# Patient Record
Sex: FEMALE | Race: WHITE | NOT HISPANIC OR LATINO | Employment: OTHER | URBAN - METROPOLITAN AREA
[De-identification: names, ages, dates, MRNs, and addresses within clinical notes are randomized per-mention and may not be internally consistent; named-entity substitution may affect disease eponyms.]

---

## 2017-01-10 ENCOUNTER — OFFICE VISIT - HEALTHEAST (OUTPATIENT)
Dept: FAMILY MEDICINE | Facility: CLINIC | Age: 57
End: 2017-01-10

## 2017-01-10 DIAGNOSIS — G40.909 SEIZURE DISORDER (H): ICD-10-CM

## 2017-01-10 DIAGNOSIS — L30.9 ECZEMA: ICD-10-CM

## 2017-01-10 DIAGNOSIS — Z00.00 HEALTH CARE MAINTENANCE: ICD-10-CM

## 2017-01-10 DIAGNOSIS — L40.1 PUSTULAR PSORIASIS: ICD-10-CM

## 2017-01-10 ASSESSMENT — MIFFLIN-ST. JEOR: SCORE: 1261.73

## 2017-01-16 ENCOUNTER — HOSPITAL ENCOUNTER (OUTPATIENT)
Dept: MAMMOGRAPHY | Facility: HOSPITAL | Age: 57
Discharge: HOME | End: 2017-01-16
Attending: FAMILY MEDICINE

## 2017-01-16 DIAGNOSIS — Z00.00 HEALTH CARE MAINTENANCE: ICD-10-CM

## 2017-01-19 ENCOUNTER — RECORDS - HEALTHEAST (OUTPATIENT)
Dept: ADMINISTRATIVE | Facility: OTHER | Age: 57
End: 2017-01-19

## 2017-02-22 ENCOUNTER — COMMUNICATION - HEALTHEAST (OUTPATIENT)
Dept: FAMILY MEDICINE | Facility: CLINIC | Age: 57
End: 2017-02-22

## 2017-02-27 ENCOUNTER — COMMUNICATION - HEALTHEAST (OUTPATIENT)
Dept: FAMILY MEDICINE | Facility: CLINIC | Age: 57
End: 2017-02-27

## 2017-02-27 ENCOUNTER — AMBULATORY - HEALTHEAST (OUTPATIENT)
Dept: LAB | Facility: CLINIC | Age: 57
End: 2017-02-27

## 2017-02-27 DIAGNOSIS — L70.0 ACNE VULGARIS: ICD-10-CM

## 2017-02-27 DIAGNOSIS — L40.1 GENERALIZED PUSTULAR PSORIASIS: ICD-10-CM

## 2017-03-02 ENCOUNTER — AMBULATORY - HEALTHEAST (OUTPATIENT)
Dept: LAB | Facility: CLINIC | Age: 57
End: 2017-03-02

## 2017-03-02 ENCOUNTER — RECORDS - HEALTHEAST (OUTPATIENT)
Dept: GENERAL RADIOLOGY | Facility: CLINIC | Age: 57
End: 2017-03-02

## 2017-03-02 DIAGNOSIS — L40.1 GENERALIZED PUSTULAR PSORIASIS: ICD-10-CM

## 2017-03-02 DIAGNOSIS — L70.0 ACNE VULGARIS: ICD-10-CM

## 2017-03-02 LAB
CHOLEST SERPL-MCNC: 184 MG/DL
FASTING STATUS PATIENT QL REPORTED: YES
HBV SURFACE AG SERPL QL IA: NEGATIVE
HCV AB SERPL QL IA: ABNORMAL
HDLC SERPL-MCNC: 48 MG/DL
HIV 1+2 AB+HIV1 P24 AG SERPL QL IA: NEGATIVE
LDLC SERPL CALC-MCNC: 109 MG/DL
TRIGL SERPL-MCNC: 135 MG/DL

## 2017-03-03 LAB
HBV CORE AB SERPL QL IA: NEGATIVE
HEPATITIS B SURFACE ANTIBODY LHE- HISTORICAL: NEGATIVE

## 2017-07-13 ENCOUNTER — AMBULATORY - HEALTHEAST (OUTPATIENT)
Dept: LAB | Facility: CLINIC | Age: 57
End: 2017-07-13

## 2017-07-13 DIAGNOSIS — L40.1 GENERALIZED PUSTULAR PSORIASIS: ICD-10-CM

## 2017-07-13 LAB
HBV SURFACE AG SERPL QL IA: NEGATIVE
HCV AB SERPL QL IA: NEGATIVE
HEPATITIS B SURFACE ANTIBODY LHE- HISTORICAL: NEGATIVE

## 2017-07-14 LAB — HBV CORE AB SERPL QL IA: NEGATIVE

## 2017-08-30 ENCOUNTER — COMMUNICATION - HEALTHEAST (OUTPATIENT)
Dept: FAMILY MEDICINE | Facility: CLINIC | Age: 57
End: 2017-08-30

## 2017-09-18 ENCOUNTER — COMMUNICATION - HEALTHEAST (OUTPATIENT)
Dept: FAMILY MEDICINE | Facility: CLINIC | Age: 57
End: 2017-09-18

## 2017-10-13 ENCOUNTER — COMMUNICATION - HEALTHEAST (OUTPATIENT)
Dept: FAMILY MEDICINE | Facility: CLINIC | Age: 57
End: 2017-10-13

## 2017-10-13 DIAGNOSIS — G40.909 SEIZURE DISORDER (H): ICD-10-CM

## 2017-12-15 ENCOUNTER — COMMUNICATION - HEALTHEAST (OUTPATIENT)
Dept: ADMINISTRATIVE | Facility: CLINIC | Age: 57
End: 2017-12-15

## 2017-12-15 DIAGNOSIS — G40.909 SEIZURE DISORDER (H): ICD-10-CM

## 2017-12-15 DIAGNOSIS — L40.9 PSORIASIS: ICD-10-CM

## 2018-01-04 ENCOUNTER — TRANSFERRED RECORDS (OUTPATIENT)
Dept: HEALTH INFORMATION MANAGEMENT | Facility: CLINIC | Age: 58
End: 2018-01-04

## 2018-02-12 ENCOUNTER — TRANSFERRED RECORDS (OUTPATIENT)
Dept: HEALTH INFORMATION MANAGEMENT | Facility: CLINIC | Age: 58
End: 2018-02-12

## 2018-02-12 ENCOUNTER — RECORDS - HEALTHEAST (OUTPATIENT)
Dept: LAB | Facility: HOSPITAL | Age: 58
End: 2018-02-12

## 2018-02-12 LAB
ANION GAP SERPL CALCULATED.3IONS-SCNC: 10 MMOL/L (ref 5–18)
CHLORIDE BLD-SCNC: 103 MMOL/L (ref 98–107)
CO2 SERPL-SCNC: 28 MMOL/L (ref 22–31)
LEVETIRACETAM (KEPPRA): 29.5 UG/ML (ref 6–46)
POTASSIUM BLD-SCNC: 3.9 MMOL/L (ref 3.5–5)
SODIUM SERPL-SCNC: 141 MMOL/L (ref 136–145)

## 2018-05-07 ENCOUNTER — TRANSFERRED RECORDS (OUTPATIENT)
Dept: HEALTH INFORMATION MANAGEMENT | Facility: CLINIC | Age: 58
End: 2018-05-07

## 2018-08-28 ENCOUNTER — TRANSFERRED RECORDS (OUTPATIENT)
Dept: HEALTH INFORMATION MANAGEMENT | Facility: CLINIC | Age: 58
End: 2018-08-28

## 2019-01-10 ENCOUNTER — TELEPHONE (OUTPATIENT)
Dept: NEUROLOGY | Facility: CLINIC | Age: 59
End: 2019-01-10

## 2019-01-10 NOTE — TELEPHONE ENCOUNTER
"I had a chance to speak with Ayala.      Patient reports that in 2011 she had episodes of dizziness and visual flashes of white light.  She was seen by her eye doctor who seemed alarmed at what he saw on exam (\"Papilledema both eyes.  Needs MRI to r/o intracranial process\").    MRI was performed 12/6/11  IMPRESSION:  1. Dilated optic nerve sheaths bilaterally with tortuosity of optic  nerves and slight flattening of posterior sclera. Findings are  consistent with some intracranial hypertension. Since there is no  evidence for any intracranial hydrocephalus, venous thrombosis,  intracranial hemorrhage, or any cerebral edema or focal masses, the  patient most likely has pseudotumor cerebri.  2. Bilateral maxillary sinus disease with fluid and/or secretions.  Findings suggest acute sinusitis.     --She saw Neuro-ophthalmology 12/14/11.   --Had Diagnostic LP 5/1/13 (opening pressure 19cm H2O)  --She had an EEG at [facility] and was told she suffers from partial seizures.  --She's been on Levetiracetam since diagnosis.  She's also been followed by [facility] since that time.    She says she's never had a seizure, to her knowledge.  However, she states she's been getting the \"white flashes\" more frequently, with the last being about 2 weeks ago.    She has her AED levels checked once a year, with the last being 2/12/18:  29.5ug/mL  She has a DMV LOC form completed every 2 years and believes that's coming due, too.    She didn't feel she'd be able to get her records from [facility] before Monday.      I did warn her she would likely not get her LOC form signed until we had the labs back.  I told her how to check the status of her DL online.      I will have forms ready to be signed when Ayala comes in Monday.    "

## 2019-01-10 NOTE — TELEPHONE ENCOUNTER
FUTURE VISIT INFORMATION        FUTURE VISIT INFORMATION:    Date: 1/14/19    Time:  0845    Location: [facility]   REFERRAL INFORMATION:    Referring provider:  Self    Referring providers clinic:      Reason for visit/diagnosis:  Partial seizures        NOTES (FOR ALL VISITS) STATUS DETAILS   OFFICE NOTE from referring provider     OFFICE NOTE from other specialist Printed  12/1/11 KEVIN  12/14/11 & 1/26/12 N  Neuro-ophthalmology  1/10/17 FP   DISCHARGE SUMMARY from hospital      DISCHARGE REPORT from the ER     OPERATIVE REPORT      MEDICATION LIST In Epic     IMAGING  (FOR ALL VISITS)       EMG      EEG      MRI (HEAD, NECK, SPINE) Printed/ Images in PACs  MRI Brain 12/6/11               OTHER

## 2019-01-14 ENCOUNTER — OFFICE VISIT (OUTPATIENT)
Dept: DERMATOLOGY | Facility: CLINIC | Age: 59
End: 2019-01-14
Payer: COMMERCIAL

## 2019-01-14 ENCOUNTER — OFFICE VISIT (OUTPATIENT)
Dept: NEUROLOGY | Facility: CLINIC | Age: 59
End: 2019-01-14
Payer: COMMERCIAL

## 2019-01-14 VITALS — HEART RATE: 72 BPM | OXYGEN SATURATION: 99 % | DIASTOLIC BLOOD PRESSURE: 86 MMHG | SYSTOLIC BLOOD PRESSURE: 133 MMHG

## 2019-01-14 VITALS
HEART RATE: 81 BPM | SYSTOLIC BLOOD PRESSURE: 124 MMHG | RESPIRATION RATE: 12 BRPM | HEIGHT: 68 IN | TEMPERATURE: 97.8 F | BODY MASS INDEX: 20.19 KG/M2 | DIASTOLIC BLOOD PRESSURE: 77 MMHG | WEIGHT: 133.2 LBS

## 2019-01-14 DIAGNOSIS — Z79.899 ENCOUNTER FOR LONG-TERM (CURRENT) USE OF MEDICATIONS: ICD-10-CM

## 2019-01-14 DIAGNOSIS — L40.9 PSORIASIS: Primary | ICD-10-CM

## 2019-01-14 DIAGNOSIS — R53.82 CHRONIC FATIGUE, UNSPECIFIED: ICD-10-CM

## 2019-01-14 DIAGNOSIS — R26.89 BALANCE PROBLEMS: ICD-10-CM

## 2019-01-14 DIAGNOSIS — G40.109 PARTIAL SEIZURE DISORDER (H): Primary | ICD-10-CM

## 2019-01-14 PROBLEM — L40.1 PUSTULAR PSORIASIS: Status: ACTIVE | Noted: 2017-01-10

## 2019-01-14 PROBLEM — G40.909 SEIZURE DISORDER (H): Status: ACTIVE | Noted: 2017-01-10

## 2019-01-14 LAB
ALBUMIN SERPL-MCNC: 4.2 G/DL (ref 3.4–5)
ALP SERPL-CCNC: 86 U/L (ref 40–150)
ALT SERPL W P-5'-P-CCNC: 26 U/L (ref 0–50)
ANION GAP SERPL CALCULATED.3IONS-SCNC: 6 MMOL/L (ref 3–14)
AST SERPL W P-5'-P-CCNC: 18 U/L (ref 0–45)
BILIRUB SERPL-MCNC: 0.4 MG/DL (ref 0.2–1.3)
BUN SERPL-MCNC: 14 MG/DL (ref 7–30)
CALCIUM SERPL-MCNC: 8.7 MG/DL (ref 8.5–10.1)
CHLORIDE SERPL-SCNC: 105 MMOL/L (ref 94–109)
CO2 SERPL-SCNC: 30 MMOL/L (ref 20–32)
CREAT SERPL-MCNC: 0.72 MG/DL (ref 0.52–1.04)
ERYTHROCYTE [DISTWIDTH] IN BLOOD BY AUTOMATED COUNT: 13 % (ref 10–15)
GFR SERPL CREATININE-BSD FRML MDRD: >90 ML/MIN/{1.73_M2}
GLUCOSE SERPL-MCNC: 108 MG/DL (ref 70–99)
HCT VFR BLD AUTO: 45.3 % (ref 35–47)
HGB BLD-MCNC: 15 G/DL (ref 11.7–15.7)
MCH RBC QN AUTO: 30.2 PG (ref 26.5–33)
MCHC RBC AUTO-ENTMCNC: 33.1 G/DL (ref 31.5–36.5)
MCV RBC AUTO: 91 FL (ref 78–100)
PLATELET # BLD AUTO: 245 10E9/L (ref 150–450)
POTASSIUM SERPL-SCNC: 3.7 MMOL/L (ref 3.4–5.3)
PROT SERPL-MCNC: 8 G/DL (ref 6.8–8.8)
RBC # BLD AUTO: 4.97 10E12/L (ref 3.8–5.2)
SODIUM SERPL-SCNC: 141 MMOL/L (ref 133–144)
TSH SERPL DL<=0.005 MIU/L-ACNC: 1.1 MU/L (ref 0.4–4)
VIT B12 SERPL-MCNC: 286 PG/ML (ref 193–986)
WBC # BLD AUTO: 7.8 10E9/L (ref 4–11)

## 2019-01-14 PROCEDURE — 99000 SPECIMEN HANDLING OFFICE-LAB: CPT | Performed by: PSYCHIATRY & NEUROLOGY

## 2019-01-14 PROCEDURE — 80053 COMPREHEN METABOLIC PANEL: CPT | Performed by: PSYCHIATRY & NEUROLOGY

## 2019-01-14 PROCEDURE — 99203 OFFICE O/P NEW LOW 30 MIN: CPT | Performed by: DERMATOLOGY

## 2019-01-14 PROCEDURE — 80177 DRUG SCRN QUAN LEVETIRACETAM: CPT | Mod: 90 | Performed by: PSYCHIATRY & NEUROLOGY

## 2019-01-14 PROCEDURE — 84443 ASSAY THYROID STIM HORMONE: CPT | Performed by: PSYCHIATRY & NEUROLOGY

## 2019-01-14 PROCEDURE — 36415 COLL VENOUS BLD VENIPUNCTURE: CPT | Performed by: PSYCHIATRY & NEUROLOGY

## 2019-01-14 PROCEDURE — 99204 OFFICE O/P NEW MOD 45 MIN: CPT | Performed by: PSYCHIATRY & NEUROLOGY

## 2019-01-14 PROCEDURE — 85027 COMPLETE CBC AUTOMATED: CPT | Performed by: PSYCHIATRY & NEUROLOGY

## 2019-01-14 PROCEDURE — 83921 ORGANIC ACID SINGLE QUANT: CPT | Mod: 90 | Performed by: PSYCHIATRY & NEUROLOGY

## 2019-01-14 PROCEDURE — 82607 VITAMIN B-12: CPT | Performed by: PSYCHIATRY & NEUROLOGY

## 2019-01-14 RX ORDER — FOLIC ACID 1 MG/1
1 TABLET ORAL DAILY
Qty: 90 TABLET | Refills: 3 | Status: SHIPPED | OUTPATIENT
Start: 2019-01-14 | End: 2019-10-08

## 2019-01-14 RX ORDER — METHOTREXATE 2.5 MG/1
TABLET ORAL
Qty: 4 TABLET | Refills: 0 | Status: SHIPPED | OUTPATIENT
Start: 2019-01-14 | End: 2019-01-21

## 2019-01-14 SDOH — SOCIAL STABILITY: SOCIAL NETWORK: ARE YOU MARRIED, WIDOWED, DIVORCED, SEPARATED, NEVER MARRIED, OR LIVING WITH A PARTNER?: DIVORCED

## 2019-01-14 ASSESSMENT — MIFFLIN-ST. JEOR: SCORE: 1222.69

## 2019-01-14 NOTE — PROGRESS NOTES
"INITIAL NEUROLOGY CONSULTATION    DATE OF VISIT: 1/14/2019  MRN: [id number]  PATIENT NAME: Ayala Wilson  YOB: 1960    REFERRING PROVIDER: No ref. provider found    Chief Complaint   Patient presents with     New Patient     Partial seizures       SUBJECTIVE:                                                      HPI:   Ayala Wilson is a 58 year old female who is self-referred for history of partial seizures. She was also followed int he past by Neuro-ophthalmology for papilledema. For the seizures, she previously followed with [facility] and has been on Keppra since her diagnosis of partial seizure in 2011. We dod not have her previous neurology records.     The patient tells me that she has some problems with dysequilibrium on her feet. I redirect the patient to a discussion about seizures, since this is why she is here. She says she was seeing Dr. Graves at [facility] in [place]. She says that she was diagnosed when she had woken one day with difficulty walking and having some visual changes, which occurred for months.  She was seen by the ophthalmologist first and they sent her on for the MRI and to see Dr. Cartagena. She says some vestibular testing was also done. She says that she did electroencephalogram through Dr. Graves's office and this was abnormal so they started her on Keppra. She says that the symptoms did dissipate once she reached a moderate dose. She says that she has not had any repeat testing done since starting the medication.    She says that in general she feels the Keppra helped. Though she later mentions she does still have flashes in her vision at times. She says that the tippiness is new over the past 6 months. She does not have history of LOC or incontinence with her seizures, and no other seizure type except for a dizzy/visual phenomenon. She later mentions that sometimes she looks in the mirror and appears to be \"blank.\"      She is on 1000mg " BID of the Keppra and thinks that this has been her dose for a few years. She does have some problems with fatigue. Otherwise, no side effects.     She has done physical therapy in the past for balance, which did help. The current balance problems is do not involve dizziness or room-spinning, No symptoms necessarily with laying in bed or head position changes.      She has no history of seizures as a child. No history of traumatic brain injury. She had a normal birth and childhood as far as she knows. She says that she plans to establish care with e primary care provider through [facility] since she recently moved from the [facility].     Family history includes stroke in mother.     Past Medical History:   Diagnosis Date     Contact dermatitis and other eczema, due to unspecified cause      Tobacco use disorder      Past Surgical History:   Procedure Laterality Date     SURGICAL HISTORY OF -       appendectomy         Current Outpatient Medications on File Prior to Visit:  calcipotriene-betamethasone dipropionate (ENSTILAR) 0.005-0.064 % external foam Apply topically daily   ixekizumab (TALTZ) 80 MG/ML SOAJ auto-injector Inject 80 mg Subcutaneous every 28 days   LEVETIRACETAM PO Take 1,000 mg by mouth 2 times daily     No current facility-administered medications on file prior to visit.   Allergies   Allergen Reactions     Zyban [Bupropion Hcl] Rash        Problem (# of Occurrences) Relation (Name,Age of Onset)    Cancer (1) Father: bone    Gastrointestinal Disease (1) Daughter (nahed): crohn's        Social History     Tobacco Use     Smoking status: Current Some Day Smoker     Packs/day: 1.00     Years: 30.00     Pack years: 30.00     Types: Cigarettes     Smokeless tobacco: Never Used   Substance Use Topics     Alcohol use: No     Drug use: No       REVIEW OF SYSTEMS:                                                      10-point review of systems is negative except as mentioned above in HPI.     EXAM:              "                                         Physical Exam:   Vitals: /77 (BP Location: Left arm, Patient Position: Sitting, Cuff Size: Adult Regular)   Pulse 81   Temp 97.8  F (36.6  C) (Tympanic)   Resp 12   Ht 1.727 m (5' 8\")   Wt 60.4 kg (133 lb 3.2 oz)   LMP 04/03/2007   BMI 20.25 kg/m    BMI= Body mass index is 20.25 kg/m .  GENERAL: NAD. Thin, frail woman.   CV: RRR. S1, S2.   EXTR: Feet are cool to the touch.   Neurologic:  MENTAL STATUS: Alert, attentive. Speech is fluent. Normal comprehension.  Normal attention and concentration. Adequate fund of knowledge.   CRANIAL NERVES: Discs technically difficult to visualize. Visual fields intact to confrontation. Pupils equally, round and reactive to light. Facial sensation and movement normal. EOM full. Hearing intact to conversation. Trapezius strength intact. Palate moves symmetrically. Tongue midline.  MOTOR: 5/5 in proximal and distal muscle groups of upper and lower extremities for size. Tone and bulk is thin.   DTRs: Intact and symmetric. Babinski down-going bilaterally.   SENSATION: Normal light touch and pinprick, except decreased pinprick to palmar surface of Left index finger. Intact proprioception. Vibration: Decreased at both ankles.   COORDINATION: Finger tapping normal. Knee heel shin normal.  STATION AND GAIT: Romberg negative. Casual gait is cautious. Tandem minimally unsteady.  Right hand-dominant.     Relevant Data:  MRI Brain (2.6.11):  IMPRESSION:  1. Dilated optic nerve sheaths bilaterally with tortuosity of optic  nerves and slight flattening of posterior sclera. Findings are  consistent with some intracranial hypertension. Since there is no  evidence for any intracranial hydrocephalus, venous thrombosis,  intracranial hemorrhage, or any cerebral edema or focal masses, the  patient most likely has pseudotumor cerebri.  2. Bilateral maxillary sinus disease with fluid and/or secretions.  Findings suggest acute sinusitis.    Imaging " reviewed by me. Agree with radiology read.     ASSESSMENT and PLAN:                                                      Assessment and Plan:     ICD-10-CM    1. Partial seizure disorder (H) G40.109 Keppra (Levetiracetam) Level     Comprehensive metabolic panel     EEG sleep deprived   2. Chronic fatigue, unspecified R53.82 CBC with platelets     Vitamin B12     Methylmalonic acid     TSH with free T4 reflex     PHYSICAL THERAPY REFERRAL   3. Encounter for long-term (current) use of medications Z79.899 Keppra (Levetiracetam) Level     Comprehensive metabolic panel     EEG sleep deprived   4. Balance problems R26.89 PHYSICAL THERAPY REFERRAL        Ms. Wilson is a pleasant 59 yo woman self-referred to establish care for partial seizure disorder. We have very little history available and the patient's description of her symptoms is somewhat vague. It is not clear to me whether the visual symptoms she occasionally experiences are seizures or an alternative phenomenon. I would like to get her prior neurology records and do an updated electroencephalogram to better understand her case. She seems to be tolerating the Keppra well, so we will plan to continue this for now and check a level and basic labs today. I did encourage her to establish care with a primary doctor for further evaluation of her balance problems. It does not sound like a vestibular problem. In the meantime, I recommend she try physical therapy. We will see her back in 2-3 months. The patient understands and agrees with the plan.     Patient Instructions:  Basic labs today for medication monitoring.   Continue the current dose of Keppra (Levetiracetam) for now: 1000mg twice daily.   Sleep-deprived electroencephalogram.  Referral for physical therapy for the balance.   Return to clinic in 2-3 months.  Make an appointment to establish care with a primary care provider too, as we discussed.    We will try to get your previous neurology records in the  meantime.    Total Time: 45 minutes were spent with the patient. More than 50% of the time spent on counseling (as described above in Assessment and Plan/Instructions) /coordinating the care.    Kay Lomeli MD  Neurology

## 2019-01-14 NOTE — NURSING NOTE
No chief complaint on file.      Vitals:    01/14/19 0949   BP: 133/86   Pulse: 72   SpO2: 99%     Wt Readings from Last 1 Encounters:   01/14/19 60.4 kg (133 lb 3.2 oz)       Archana Taylor LPN.................1/14/2019

## 2019-01-14 NOTE — LETTER
1/14/2019         RE: Ayala Wilson  [address] [address] [facility] [id number]        Dear Colleague,    Thank you for referring your patient, Ayala Wilson, to the [facility]. Please see a copy of my visit note below.    Ayala Wilson is a 58 year old year old female patient here today for psroasis on hands.  Has been on  Humira, nbuvb, currently on talz for one year.  It is a little better, not clear.  Joint pain on fingers.   .  Patient states this has been present for years.  Patient reports the following symptoms:  Rash.  Bx pemberton dpustular psoriasis.  .  Patient reports the following previous treatments see above.  Patient reports the following modifying factors none.  Associated symptoms: none.  Patient has no other skin complaints today.  Remainder of the HPI, Meds, PMH, Allergies, FH, and SH was reviewed in chart.      Past Medical History:   Diagnosis Date     Contact dermatitis and other eczema, due to unspecified cause      Tobacco use disorder        Past Surgical History:   Procedure Laterality Date     SURGICAL HISTORY OF -       appendectomy        Family History   Problem Relation Age of Onset     Cancer Father         bone     Gastrointestinal Disease Daughter         crohn's       Social History     Socioeconomic History     Marital status:      Spouse name: Not on file     Number of children: Not on file     Years of education: Not on file     Highest education level: Not on file   Social Needs     Financial resource strain: Not on file     Food insecurity - worry: Not on file     Food insecurity - inability: Not on file     Transportation needs - medical: Not on file     Transportation needs - non-medical: Not on file   Occupational History     Not on file   Tobacco Use     Smoking status: Current Some Day Smoker     Packs/day: 1.00     Years: 30.00     Pack years: 30.00     Types: Cigarettes     Smokeless tobacco: Never Used   Substance and Sexual Activity      Alcohol use: No     Drug use: No     Sexual activity: Yes     Partners: Male   Other Topics Concern     Parent/sibling w/ CABG, MI or angioplasty before 65F 55M? No   Social History Narrative     Not on file       Outpatient Encounter Medications as of 1/14/2019   Medication Sig Dispense Refill     calcipotriene-betamethasone dipropionate (ENSTILAR) 0.005-0.064 % external foam Apply topically daily       folic acid (FOLVITE) 1 MG tablet Take 1 tablet (1 mg) by mouth daily Except day of methotrecate 90 tablet 3     ixekizumab (TALTZ) 80 MG/ML SOAJ auto-injector Inject 80 mg Subcutaneous every 28 days       LEVETIRACETAM PO Take 1,000 mg by mouth 2 times daily       methotrexate sodium 2.5 MG TABS 4 tablets by mouth once per week 4 tablet 0     No facility-administered encounter medications on file as of 1/14/2019.              Review Of Systems  Skin: As above  Eyes: negative  Ears/Nose/Throat: negative  Respiratory: No shortness of breath, dyspnea on exertion, cough, or hemoptysis  Cardiovascular: negative  Gastrointestinal: negative  Genitourinary: negative  Musculoskeletal: negative  Neurologic: negative  Psychiatric: negative  Hematologic/Lymphatic/Immunologic: negative  Endocrine: negative      O:   NAD, WDWN, Alert & Oriented, Mood & Affect wnl, Vitals stable   Here today alone   /86   Pulse 72   LMP 04/03/2007   SpO2 99%    General appearance normal   Vitals stable   Alert, oriented and in no acute distress     eczmeatous plaques with fissures BL hands      The remainder of expanded problem focused exam was unremarkable; the following areas were examined:  scalp/hair, conjunctiva/lids, face, neck, lips, chest, digits/nails, RUE, LUE.      Eyes: Conjunctivae/lids:Normal     ENT: Lips, buccal mucosa, tongue: normal    MSK:Normal    Cardiovascular: peripheral edema none    Pulm: Breathing Normal    Lymph Nodes: No Head and Neck Lymphadenopathy     Neuro/Psych: Orientation:Normal;  Mood/Affect:Normal      A/P:  1. Pustular psoriasis  Path reviewed  NBUVB discussed with patient  Switching biologics discussed with patient   Methotrexate discussed with patient   Methotrexate is a medication used in low doses to treat inflammatory skin conditions such as psoriasis and eczema/dermatitis. It is also prescribed for rheumatoid arthritis, psoriatic arthritis, and increasingly, other inflammatory and autoimmune disorders (off-label). In much higher doses, it is used as a chemotherapy agent for leukemia and some other forms of cancer.    For responding skin diseases, methotrexate usually shows some benefit within 6 to 8 weeks. Maximum effects are generally achieved within 5 to 6 months, depending on dose escalation.    Side effects of methotrexate  Side effects can occur at any time during treatment with methotrexate, but are most common in the first few weeks. Folic acid supplements, is thought to reduce some of the side effects of methotrexate.     If the side effects described below or other problems trouble you, or should you develop any signs of infection or unusual bleeding, notify your doctor promptly and before your next dose of methotrexate is due.      The most common side effects of methotrexate are loss of appetite, nausea and diarrhea, and affect about one in 12 patients. These side effects are usually temporary, but changes in dose and/or supplemental folic acid tablets may be helpful.    An overdose of methotrexate or deficiency of the vitamin folic acid may result in anemia , reduced white cell count, risking serious infections, and low platelet count, resulting in bruising and bleeding.    Methotrexate is stored by the liver. Transaminase liver enzyme levels may rise for a few days after treatment but they quickly return to normal.     Long term therapy may be associated with scarring (fibrosis or cirrhosis) of the liver. This is more commonly due to other reasons such as fatty liver,  diabetes, hyperlipidemia, and obesity (ie metabolic syndrome), but can also develop from viral hepatitis and alcohol.    Methotrexate can rarely cause a lung reaction similar to pneumonia called acute pneumonitis or interstitial pneumonia.      Today  -  Some baseline laboratory tests will be checked  - A prescription for folic acid will be sent to pharmacy for you to start.  (This is a vitamin that can help reduce the side effects of methotrexate)  -  A test dose of the medication to the pharmacy   -  Take all of the pills in the methotrexate prescription on the same day  -  Recheck your labs at any [facility] 7 days from the date you take the test dose  -  Once your blood work is complete, our office we will call and let you know if you can continue methotrexate         Again, thank you for allowing me to participate in the care of your patient.        Sincerely,        Vijay Cox MD

## 2019-01-14 NOTE — LETTER
1/14/2019         RE: Ayala Wilson  [address] [facility] [address]        Dear Colleague,    Thank you for referring your patient, Ayala Wilson, to the [facility]. Please see a copy of my visit note below.    INITIAL NEUROLOGY CONSULTATION    DATE OF VISIT: 1/14/2019  MRN: [id number]  PATIENT NAME: Ayala Wilson  YOB: 1960    REFERRING PROVIDER: No ref. provider found    Chief Complaint   Patient presents with     New Patient     Partial seizures       SUBJECTIVE:                                                      HPI:   Ayala Wilson is a 58 year old female who is self-referred for history of partial seizures. She was also followed int he past by Neuro-ophthalmology for papilledema. For the seizures, she previously followed with [facility] and has been on Keppra since her diagnosis of partial seizure in 2011. We dod not have her previous neurology records.     The patient tells me that she has some problems with dysequilibrium on her feet. I redirect the patient to a discussion about seizures, since this is why she is here. She says she was seeing Dr. Graves at [facility] in [place]. She says that she was diagnosed when she had woken one day with difficulty walking and having some visual changes, which occurred for months.  She was seen by the ophthalmologist first and they sent her on for the MRI and to see Dr. Cartagena. She says some vestibular testing was also done. She says that she did electroencephalogram through Dr. Graves's office and this was abnormal so they started her on Keppra. She says that the symptoms did dissipate once she reached a moderate dose. She says that she has not had any repeat testing done since starting the medication.    She says that in general she feels the Keppra helped. Though she later mentions she does still have flashes in her vision at times. She says that the tippiness is new over the past 6  "months. She does not have history of LOC or incontinence with her seizures, and no other seizure type except for a dizzy/visual phenomenon. She later mentions that sometimes she looks in the mirror and appears to be \"blank.\"      She is on 1000mg BID of the Keppra and thinks that this has been her dose for a few years. She does have some problems with fatigue. Otherwise, no side effects.     She has done physical therapy in the past for balance, which did help. The current balance problems is do not involve dizziness or room-spinning, No symptoms necessarily with laying in bed or head position changes.      She has no history of seizures as a child. No history of traumatic brain injury. She had a normal birth and childhood as far as she knows. She says that she plans to establish care with e primary care provider through [place] since she recently moved from the [facility].     Family history includes stroke in mother.     Past Medical History:   Diagnosis Date     Contact dermatitis and other eczema, due to unspecified cause      Tobacco use disorder      Past Surgical History:   Procedure Laterality Date     SURGICAL HISTORY OF -       appendectomy         Current Outpatient Medications on File Prior to Visit:  calcipotriene-betamethasone dipropionate (ENSTILAR) 0.005-0.064 % external foam Apply topically daily   ixekizumab (TALTZ) 80 MG/ML SOAJ auto-injector Inject 80 mg Subcutaneous every 28 days   LEVETIRACETAM PO Take 1,000 mg by mouth 2 times daily     No current facility-administered medications on file prior to visit.   Allergies   Allergen Reactions     Zyban [Bupropion Hcl] Rash        Problem (# of Occurrences) Relation (Name,Age of Onset)    Cancer (1) Father: bone    Gastrointestinal Disease (1) Daughter (nahed): crohn's        Social History     Tobacco Use     Smoking status: Current Some Day Smoker     Packs/day: 1.00     Years: 30.00     Pack years: 30.00     Types: Cigarettes     Smokeless " "tobacco: Never Used   Substance Use Topics     Alcohol use: No     Drug use: No       REVIEW OF SYSTEMS:                                                      10-point review of systems is negative except as mentioned above in HPI.     EXAM:                                                      Physical Exam:   Vitals: /77 (BP Location: Left arm, Patient Position: Sitting, Cuff Size: Adult Regular)   Pulse 81   Temp 97.8  F (36.6  C) (Tympanic)   Resp 12   Ht 1.727 m (5' 8\")   Wt 60.4 kg (133 lb 3.2 oz)   LMP 04/03/2007   BMI 20.25 kg/m     BMI= Body mass index is 20.25 kg/m .  GENERAL: NAD. Thin, frail woman.   CV: RRR. S1, S2.   EXTR: Feet are cool to the touch.   Neurologic:  MENTAL STATUS: Alert, attentive. Speech is fluent. Normal comprehension.  Normal attention and concentration. Adequate fund of knowledge.   CRANIAL NERVES: Discs technically difficult to visualize. Visual fields intact to confrontation. Pupils equally, round and reactive to light. Facial sensation and movement normal. EOM full. Hearing intact to conversation. Trapezius strength intact. Palate moves symmetrically. Tongue midline.  MOTOR: 5/5 in proximal and distal muscle groups of upper and lower extremities for size. Tone and bulk is thin.   DTRs: Intact and symmetric. Babinski down-going bilaterally.   SENSATION: Normal light touch and pinprick, except decreased pinprick to palmar surface of Left index finger. Intact proprioception. Vibration: Decreased at both ankles.   COORDINATION: Finger tapping normal. Knee heel shin normal.  STATION AND GAIT: Romberg negative. Casual gait is cautious. Tandem minimally unsteady.  Right hand-dominant.     Relevant Data:  MRI Brain (2.6.11):  IMPRESSION:  1. Dilated optic nerve sheaths bilaterally with tortuosity of optic  nerves and slight flattening of posterior sclera. Findings are  consistent with some intracranial hypertension. Since there is no  evidence for any intracranial " hydrocephalus, venous thrombosis,  intracranial hemorrhage, or any cerebral edema or focal masses, the  patient most likely has pseudotumor cerebri.  2. Bilateral maxillary sinus disease with fluid and/or secretions.  Findings suggest acute sinusitis.    Imaging reviewed by me. Agree with radiology read.     ASSESSMENT and PLAN:                                                      Assessment and Plan:     ICD-10-CM    1. Partial seizure disorder (H) G40.109 Keppra (Levetiracetam) Level     Comprehensive metabolic panel     EEG sleep deprived   2. Chronic fatigue, unspecified R53.82 CBC with platelets     Vitamin B12     Methylmalonic acid     TSH with free T4 reflex     PHYSICAL THERAPY REFERRAL   3. Encounter for long-term (current) use of medications Z79.899 Keppra (Levetiracetam) Level     Comprehensive metabolic panel     EEG sleep deprived   4. Balance problems R26.89 PHYSICAL THERAPY REFERRAL        Ms. Wilson is a pleasant 59 yo woman self-referred to establish care for partial seizure disorder. We have very little history available and the patient's description of her symptoms is somewhat vague. It is not clear to me whether the visual symptoms she occasionally experiences are seizures or an alternative phenomenon. I would like to get her prior neurology records and do an updated electroencephalogram to better understand her case. She seems to be tolerating the Keppra well, so we will plan to continue this for now and check a level and basic labs today. I did encourage her to establish care with a primary doctor for further evaluation of her balance problems. It does not sound like a vestibular problem. In the meantime, I recommend she try physical therapy. We will see her back in 2-3 months. The patient understands and agrees with the plan.     Patient Instructions:  Basic labs today for medication monitoring.   Continue the current dose of Keppra (Levetiracetam) for now: 1000mg twice daily.    Sleep-deprived electroencephalogram.  Referral for physical therapy for the balance.   Return to clinic in 2-3 months.  Make an appointment to establish care with a primary care provider too, as we discussed.    We will try to get your previous neurology records in the meantime.    Total Time: 45 minutes were spent with the patient. More than 50% of the time spent on counseling (as described above in Assessment and Plan/Instructions) /coordinating the care.    Kay Lomeli MD  Neurology      Again, thank you for allowing me to participate in the care of your patient.        Sincerely,        Kay Lomeli MD

## 2019-01-14 NOTE — PATIENT INSTRUCTIONS
Plan:    Basic labs today for medication monitoring.   Continue the current dose of Keppra (Levetiracetam) for now: 1000mg twice daily.   Sleep-deprived electroencephalogram.  Referral for physical therapy for the balance.   Return to clinic in 2-3 months.  Make an appointment to establish care with a primary care provider too, as we discussed.    We will try to get your previous neurology records in the meantime.

## 2019-01-14 NOTE — PATIENT INSTRUCTIONS
Methotrexate is a medication used in low doses to treat inflammatory skin conditions such as psoriasis and eczema/dermatitis. It is also prescribed for rheumatoid arthritis, psoriatic arthritis, and increasingly, other inflammatory and autoimmune disorders (off-label). In much higher doses, it is used as a chemotherapy agent for leukemia and some other forms of cancer.    For responding skin diseases, methotrexate usually shows some benefit within 6 to 8 weeks. Maximum effects are generally achieved within 5 to 6 months, depending on dose escalation.    Side effects of methotrexate  Side effects can occur at any time during treatment with methotrexate, but are most common in the first few weeks. Folic acid supplements, is thought to reduce some of the side effects of methotrexate.     If the side effects described below or other problems trouble you, or should you develop any signs of infection or unusual bleeding, notify your doctor promptly and before your next dose of methotrexate is due.      The most common side effects of methotrexate are loss of appetite, nausea and diarrhea, and affect about one in 12 patients. These side effects are usually temporary, but changes in dose and/or supplemental folic acid tablets may be helpful.    An overdose of methotrexate or deficiency of the vitamin folic acid may result in anemia , reduced white cell count, risking serious infections, and low platelet count, resulting in bruising and bleeding.    Methotrexate is stored by the liver. Transaminase liver enzyme levels may rise for a few days after treatment but they quickly return to normal.     Long term therapy may be associated with scarring (fibrosis or cirrhosis) of the liver. This is more commonly due to other reasons such as fatty liver, diabetes, hyperlipidemia, and obesity (ie metabolic syndrome), but can also develop from viral hepatitis and alcohol.    Methotrexate can rarely cause a lung reaction similar to  pneumonia called acute pneumonitis or interstitial pneumonia.      Today  -  Some baseline laboratory tests will be checked  - A prescription for folic acid will be sent to pharmacy for you to start.  (This is a vitamin that can help reduce the side effects of methotrexate)  -  A test dose of the medication to the pharmacy   -  Take all of the pills in the methotrexate prescription on the same day  -  Recheck your labs at any [facility] 7 days from the date you take the test dose  -  Once your blood work is complete, our office we will call and let you know if you can continue methotrexate

## 2019-01-14 NOTE — PROGRESS NOTES
Ayala Wilson is a 58 year old year old female patient here today for psroasis on hands.  Has been on  Humira, nbuvb, currently on talz for one year.  It is a little better, not clear.  Joint pain on fingers.   .  Patient states this has been present for years.  Patient reports the following symptoms:  Rash.  Bx pemberton dpustular psoriasis.  .  Patient reports the following previous treatments see above.  Patient reports the following modifying factors none.  Associated symptoms: none.  Patient has no other skin complaints today.  Remainder of the HPI, Meds, PMH, Allergies, FH, and SH was reviewed in chart.      Past Medical History:   Diagnosis Date     Contact dermatitis and other eczema, due to unspecified cause      Tobacco use disorder        Past Surgical History:   Procedure Laterality Date     SURGICAL HISTORY OF -       appendectomy        Family History   Problem Relation Age of Onset     Cancer Father         bone     Gastrointestinal Disease Daughter         crohn's       Social History     Socioeconomic History     Marital status:      Spouse name: Not on file     Number of children: Not on file     Years of education: Not on file     Highest education level: Not on file   Social Needs     Financial resource strain: Not on file     Food insecurity - worry: Not on file     Food insecurity - inability: Not on file     Transportation needs - medical: Not on file     Transportation needs - non-medical: Not on file   Occupational History     Not on file   Tobacco Use     Smoking status: Current Some Day Smoker     Packs/day: 1.00     Years: 30.00     Pack years: 30.00     Types: Cigarettes     Smokeless tobacco: Never Used   Substance and Sexual Activity     Alcohol use: No     Drug use: No     Sexual activity: Yes     Partners: Male   Other Topics Concern     Parent/sibling w/ CABG, MI or angioplasty before 65F 55M? No   Social History Narrative     Not on file       Outpatient Encounter  Medications as of 1/14/2019   Medication Sig Dispense Refill     calcipotriene-betamethasone dipropionate (ENSTILAR) 0.005-0.064 % external foam Apply topically daily       folic acid (FOLVITE) 1 MG tablet Take 1 tablet (1 mg) by mouth daily Except day of methotrecate 90 tablet 3     ixekizumab (TALTZ) 80 MG/ML SOAJ auto-injector Inject 80 mg Subcutaneous every 28 days       LEVETIRACETAM PO Take 1,000 mg by mouth 2 times daily       methotrexate sodium 2.5 MG TABS 4 tablets by mouth once per week 4 tablet 0     No facility-administered encounter medications on file as of 1/14/2019.              Review Of Systems  Skin: As above  Eyes: negative  Ears/Nose/Throat: negative  Respiratory: No shortness of breath, dyspnea on exertion, cough, or hemoptysis  Cardiovascular: negative  Gastrointestinal: negative  Genitourinary: negative  Musculoskeletal: negative  Neurologic: negative  Psychiatric: negative  Hematologic/Lymphatic/Immunologic: negative  Endocrine: negative      O:   NAD, WDWN, Alert & Oriented, Mood & Affect wnl, Vitals stable   Here today alone   /86   Pulse 72   LMP 04/03/2007   SpO2 99%    General appearance normal   Vitals stable   Alert, oriented and in no acute distress     eczmeatous plaques with fissures BL hands      The remainder of expanded problem focused exam was unremarkable; the following areas were examined:  scalp/hair, conjunctiva/lids, face, neck, lips, chest, digits/nails, RUE, LUE.      Eyes: Conjunctivae/lids:Normal     ENT: Lips, buccal mucosa, tongue: normal    MSK:Normal    Cardiovascular: peripheral edema none    Pulm: Breathing Normal    Lymph Nodes: No Head and Neck Lymphadenopathy     Neuro/Psych: Orientation:Normal; Mood/Affect:Normal      A/P:  1. Pustular psoriasis  Path reviewed  NBUVB discussed with patient  Switching biologics discussed with patient   Methotrexate discussed with patient   Methotrexate is a medication used in low doses to treat inflammatory skin  conditions such as psoriasis and eczema/dermatitis. It is also prescribed for rheumatoid arthritis, psoriatic arthritis, and increasingly, other inflammatory and autoimmune disorders (off-label). In much higher doses, it is used as a chemotherapy agent for leukemia and some other forms of cancer.    For responding skin diseases, methotrexate usually shows some benefit within 6 to 8 weeks. Maximum effects are generally achieved within 5 to 6 months, depending on dose escalation.    Side effects of methotrexate  Side effects can occur at any time during treatment with methotrexate, but are most common in the first few weeks. Folic acid supplements, is thought to reduce some of the side effects of methotrexate.     If the side effects described below or other problems trouble you, or should you develop any signs of infection or unusual bleeding, notify your doctor promptly and before your next dose of methotrexate is due.      The most common side effects of methotrexate are loss of appetite, nausea and diarrhea, and affect about one in 12 patients. These side effects are usually temporary, but changes in dose and/or supplemental folic acid tablets may be helpful.    An overdose of methotrexate or deficiency of the vitamin folic acid may result in anemia , reduced white cell count, risking serious infections, and low platelet count, resulting in bruising and bleeding.    Methotrexate is stored by the liver. Transaminase liver enzyme levels may rise for a few days after treatment but they quickly return to normal.     Long term therapy may be associated with scarring (fibrosis or cirrhosis) of the liver. This is more commonly due to other reasons such as fatty liver, diabetes, hyperlipidemia, and obesity (ie metabolic syndrome), but can also develop from viral hepatitis and alcohol.    Methotrexate can rarely cause a lung reaction similar to pneumonia called acute pneumonitis or interstitial pneumonia.      Today  -   Some baseline laboratory tests will be checked  - A prescription for folic acid will be sent to pharmacy for you to start.  (This is a vitamin that can help reduce the side effects of methotrexate)  -  A test dose of the medication to the pharmacy   -  Take all of the pills in the methotrexate prescription on the same day  -  Recheck your labs at any [facility] 7 days from the date you take the test dose  -  Once your blood work is complete, our office we will call and let you know if you can continue methotrexate

## 2019-01-14 NOTE — LETTER
[facility]  [address] [address]  [phone]        February 8, 2019    Ayala Wilson  [address] [address]              Dear Ayala Wilson    This is to remind you that your non fasting blood work is due.    You may call our office at [phone] to schedule an appointment.    Please disregard this notice if you have already had your labs drawn or made an appointment.        Sincerely,        Vijay Cox MD

## 2019-01-15 LAB — LEVETIRACETAM SERPL-MCNC: 33 UG/ML (ref 12–46)

## 2019-01-16 ENCOUNTER — TELEPHONE (OUTPATIENT)
Dept: DERMATOLOGY | Facility: CLINIC | Age: 59
End: 2019-01-16

## 2019-01-16 LAB — METHYLMALONATE SERPL-SCNC: 0.26 UMOL/L (ref 0–0.4)

## 2019-01-16 NOTE — TELEPHONE ENCOUNTER
Left message for pt on identified voicemail that she needed to do the test dosing and then the lab and if everything went well then they could send in a 90 day supply of Methotrexate.  Advised to call if she had questions.  Lena CONROY RN BSN PHN  Specialty Clinics

## 2019-01-16 NOTE — TELEPHONE ENCOUNTER
Pt is requesting 90 day supply of Methotrexate per [facility],please call [phone]

## 2019-01-17 DIAGNOSIS — L40.9 PSORIASIS: ICD-10-CM

## 2019-01-17 NOTE — RESULT ENCOUNTER NOTE
Called patient and unable to leave VM as mailbox is full, will try patient again at a later time.    Sahra LYN MA

## 2019-01-17 NOTE — TELEPHONE ENCOUNTER
Not a family practice patient here at .  Last refilled by Dr. Cox in [place].    Will route to Dr. Cox for refill consideration.    Stephie JETT RN

## 2019-01-17 NOTE — RESULT ENCOUNTER NOTE
Please advise Ayala Wilson,  1960, that her lab results were unremarkable except that the B12 is in the low-normal range. I recommend she start a daily supplement: 1000mcg. Keppra level looks good.    [phone] (home) [phone] (work)    Thank you,  Kay Lomeli MD

## 2019-01-17 NOTE — TELEPHONE ENCOUNTER
Patient was seen on 1-14-19 and had labs drawn under Neurologist's orders for CBC and CMP, but  Hep B and C were not drawn at that time.     I have not yet spoken to patient, but if she has not yet taken test dose of Methotrexate, do you want Hep B and C drawn PRIOR to her taking test dose of Methotrexate?     Or-can I tell her to take test dose of Methotrexate then have Hep B and C drawn when she does 1 week follow up lab draw?...    Please advise. Daksha Em RN

## 2019-01-17 NOTE — TELEPHONE ENCOUNTER
Requested Prescriptions   Pending Prescriptions Disp Refills     methotrexate sodium 2.5 MG TABS 4 tablet 0     Si tablets by mouth once per week    There is no refill protocol information for this order        Last Written Prescription Date:  19  Last Fill Quantity: 4,  # refills: 0   Last office visit: 2012 with prescribing provider:  Kenny   Future Office Visit:   Next 5 appointments (look out 90 days)    Mar 11, 2019  9:15 AM CDT  Return Visit with Vijay Cox MD  [facility] [address] [id number] [phone]

## 2019-01-21 RX ORDER — METHOTREXATE 2.5 MG/1
TABLET ORAL
Qty: 4 TABLET | Refills: 0 | Status: SHIPPED | OUTPATIENT
Start: 2019-01-21 | End: 2019-04-02 | Stop reason: DRUGHIGH

## 2019-01-21 NOTE — RESULT ENCOUNTER NOTE
"Called and spoke to patient, informed per Dr. Lomeli: \"her lab results were unremarkable except that the B12 is in the low-normal range. I recommend she start a daily supplement: 1000mcg. Keppra level looks good.\"    Understanding voiced and no further questions noted.     Sahra LYN MA  "

## 2019-01-23 NOTE — TELEPHONE ENCOUNTER
"Spoke to patient and explained routine for Methotrexate. She has not taken test dose yet and stated: \"I was vincenzo scared to try it..\"     I did explain that her baseline labs were drawn already by Neurologist and they were fine per Dr. Cox (I also reviewed them).     So if she decides to try the Methotrexate, she should take the test dose of all 4 tablets at once, then wait 1 week, then have her labs drawn again at any [facility] (Advised needs a lab appt).    Once we receive 2nd set of labs back, if they are wnl, Provider usually starts patient on once weekly dosing of Methotrexate, but she should take Folic acid everyday as it helps lessen the side effects of the Methotrexate.     She then returns for a recheck appt in 8 weeks, if all is going well, she will then be seen/have labs drawn twice a year-every 6 months. Patient verbalized understanding. Daksha Em RN    "

## 2019-02-07 ENCOUNTER — HOSPITAL ENCOUNTER (OUTPATIENT)
Dept: NEUROLOGY | Facility: CLINIC | Age: 59
Discharge: HOME | End: 2019-02-07
Attending: PSYCHIATRY & NEUROLOGY | Admitting: PSYCHIATRY & NEUROLOGY
Payer: COMMERCIAL

## 2019-02-07 ENCOUNTER — TELEPHONE (OUTPATIENT)
Dept: NEUROLOGY | Facility: CLINIC | Age: 59
End: 2019-02-07

## 2019-02-07 DIAGNOSIS — G40.109 PARTIAL SEIZURE DISORDER (H): ICD-10-CM

## 2019-02-07 DIAGNOSIS — Z79.899 ENCOUNTER FOR LONG-TERM (CURRENT) USE OF MEDICATIONS: ICD-10-CM

## 2019-02-07 PROCEDURE — 95819 EEG AWAKE AND ASLEEP: CPT

## 2019-02-07 NOTE — PROCEDURES
OUTPATIENT SLEEP-DEPRIVED EEG REPORT.    Patient Name:  Ayala Wilson  MRN:     [id number]  : `    1960    EEG#: 19-07.        DATE OF RECORDIN2019.       DURATION OF RECORDIN minutes and 12 seconds.        CLINICAL SUMMARY: Ayala Wilson is 59 year old female patient with past medical history of epilepsy, who was recently seen in neurology clinic for seizure disorder. This study was ordered to evaluate for seizures and epileptiform abnormalities. On the day of the study, the patient is reported to take Keppra amongst other listed in medical record medications.      TECHNICAL SUMMARY:  This outpatient sleep-deprived EEG monitoring procedure is performed with 19 scalp electrodes in 10-20 system placements, and additional scalp, precordial, and other surface electrodes used for electrical referencing and artifact detection. Video monitoring is not utilized.          INTERICTAL EEG ACTIVITIES: During maximal wakefulness, background consists of symmetric moderate amplitude 10 Hz posterior dominant rhythm, that attenuates with eye opening.  There is no diffuse or focal slowing during waking seen. Drowsiness is manifested by predominance of centrally maximum semirhythmic theta slowing and dropout of posterior dominant rhythm.  No further sleep progression is seen. Intermittent photic stimulation is performed and does not induce any epileptiform abnormalities. Hyperventilation is not performed.        INTERICTAL EPILEPTIFORM DISCHARGES: None.        ICTAL RECORDINGS: No electrographic or clinical seizures and no paroxysmal behavioral events are recorded during this study.        IMPRESSION: This outpatient sleep-deprived EEG study is normal during wakefulness and drowsiness without any interictal epileptiform discharges, electrographic or clinical seizures, and paroxysmal behavioral events.  Clinical correlation is recommended.  Severo Matthews MD.

## 2019-02-19 NOTE — TELEPHONE ENCOUNTER
Kay Aguila MD Lyzhoft, Amy J, WALDEMAR 8 days ago      Yes it was compared to the prior electroencephalogram. This does not mean she was misdiagnosed. It means the Keppra is working to block seizure activity. This would mean I think she should continue the Keppra for anti-seizure purposes.     RANDALL (Routing comment)       Rosaura Valentin CMA [place] Jaspers, Christina, MD 11 days ago      Hi Dr. Lomeli     I gave Ayala her message and she had some questions.   She is wondering if this current eeg was compared to her last eeg. If it was then she is wondering if she was diagnosed correctly.     Rosaura (Routing comment)

## 2019-02-19 NOTE — TELEPHONE ENCOUNTER
Several messages were left for patient to return call.  I've opted to send a letter advising per Dr. Lomeli' note.

## 2019-03-04 DIAGNOSIS — L40.9 PSORIASIS: ICD-10-CM

## 2019-03-04 LAB
ALBUMIN SERPL-MCNC: 3.8 G/DL (ref 3.4–5)
ALP SERPL-CCNC: 96 U/L (ref 40–150)
ALT SERPL W P-5'-P-CCNC: 25 U/L (ref 0–50)
ANION GAP SERPL CALCULATED.3IONS-SCNC: 5 MMOL/L (ref 3–14)
AST SERPL W P-5'-P-CCNC: 16 U/L (ref 0–45)
BILIRUB SERPL-MCNC: 0.2 MG/DL (ref 0.2–1.3)
BUN SERPL-MCNC: 17 MG/DL (ref 7–30)
CALCIUM SERPL-MCNC: 8.9 MG/DL (ref 8.5–10.1)
CHLORIDE SERPL-SCNC: 103 MMOL/L (ref 94–109)
CO2 SERPL-SCNC: 30 MMOL/L (ref 20–32)
CREAT SERPL-MCNC: 0.76 MG/DL (ref 0.52–1.04)
ERYTHROCYTE [DISTWIDTH] IN BLOOD BY AUTOMATED COUNT: 13.6 % (ref 10–15)
GFR SERPL CREATININE-BSD FRML MDRD: 84 ML/MIN/{1.73_M2}
GLUCOSE SERPL-MCNC: 86 MG/DL (ref 70–99)
HCT VFR BLD AUTO: 41.8 % (ref 35–47)
HGB BLD-MCNC: 13.7 G/DL (ref 11.7–15.7)
MCH RBC QN AUTO: 30 PG (ref 26.5–33)
MCHC RBC AUTO-ENTMCNC: 32.8 G/DL (ref 31.5–36.5)
MCV RBC AUTO: 92 FL (ref 78–100)
PLATELET # BLD AUTO: 258 10E9/L (ref 150–450)
POTASSIUM SERPL-SCNC: 4.1 MMOL/L (ref 3.4–5.3)
PROT SERPL-MCNC: 7.4 G/DL (ref 6.8–8.8)
RBC # BLD AUTO: 4.57 10E12/L (ref 3.8–5.2)
SODIUM SERPL-SCNC: 138 MMOL/L (ref 133–144)
WBC # BLD AUTO: 5.3 10E9/L (ref 4–11)

## 2019-03-04 PROCEDURE — 86803 HEPATITIS C AB TEST: CPT | Performed by: DERMATOLOGY

## 2019-03-04 PROCEDURE — 36415 COLL VENOUS BLD VENIPUNCTURE: CPT | Performed by: DERMATOLOGY

## 2019-03-04 PROCEDURE — 80053 COMPREHEN METABOLIC PANEL: CPT | Performed by: DERMATOLOGY

## 2019-03-04 PROCEDURE — 85027 COMPLETE CBC AUTOMATED: CPT | Performed by: DERMATOLOGY

## 2019-03-04 PROCEDURE — 87340 HEPATITIS B SURFACE AG IA: CPT | Performed by: DERMATOLOGY

## 2019-03-05 ENCOUNTER — TELEPHONE (OUTPATIENT)
Dept: DERMATOLOGY | Facility: CLINIC | Age: 59
End: 2019-03-05

## 2019-03-05 LAB
HBV SURFACE AG SERPL QL IA: NONREACTIVE
HCV AB SERPL QL IA: NONREACTIVE

## 2019-03-05 NOTE — TELEPHONE ENCOUNTER
Reason for Call:  Other     Detailed comments: Pt says she does not have any more Taltz injections (prescribed by previous dermatologist). She is unclear if she is supposed to continue with the Taltz, and has questions about how to take the methotrexate. - Please advise    Pt has followup appt on 04/01.     Phone Number Patient can be reached at: Home number on file [phone] (home)    Best Time: Any    Can we leave a detailed message on this number? YES    Call taken on 3/5/2019 at 12:13 PM by Denise Behrendt

## 2019-03-05 NOTE — TELEPHONE ENCOUNTER
Message left to return call.    It appears she was being switched to Methotrexate per Dr. Cox dictation from January 2019. Looks like she had repeat labs drawn yesterday, so just need to verify if she took the test dose of Methotrexate and if so, when? As she should have had labs drawn 1 week after taking Methotrexate test dose...    She would not need to be on both Taltz and Methotrexate....    Daksha Em RN

## 2019-03-07 NOTE — TELEPHONE ENCOUNTER
Spoke to patient and she had not yet taken her test dose and was confused about the process. Advised pt to take her test dose and have labs one week after taking the test dose. Informed her that we would call her with the results and then give direction on whether or not to continue and the dosing. Advised that she would be able to stop the Taltz if methotrexate works for her. Pt agreed with plan.   Lena CONROY RN BSN PHN  [facility]

## 2019-04-01 ENCOUNTER — OFFICE VISIT (OUTPATIENT)
Dept: DERMATOLOGY | Facility: CLINIC | Age: 59
End: 2019-04-01
Payer: COMMERCIAL

## 2019-04-01 VITALS — HEART RATE: 85 BPM | OXYGEN SATURATION: 98 % | DIASTOLIC BLOOD PRESSURE: 86 MMHG | SYSTOLIC BLOOD PRESSURE: 134 MMHG

## 2019-04-01 DIAGNOSIS — L20.81 ATOPIC NEURODERMATITIS: ICD-10-CM

## 2019-04-01 DIAGNOSIS — Z51.81 MEDICATION MONITORING ENCOUNTER: Primary | ICD-10-CM

## 2019-04-01 DIAGNOSIS — L40.9 PSORIASIS: ICD-10-CM

## 2019-04-01 LAB
ALBUMIN SERPL-MCNC: 3.7 G/DL (ref 3.4–5)
ALP SERPL-CCNC: 83 U/L (ref 40–150)
ALT SERPL W P-5'-P-CCNC: 26 U/L (ref 0–50)
ANION GAP SERPL CALCULATED.3IONS-SCNC: 5 MMOL/L (ref 3–14)
AST SERPL W P-5'-P-CCNC: 21 U/L (ref 0–45)
BASOPHILS # BLD AUTO: 0 10E9/L (ref 0–0.2)
BASOPHILS NFR BLD AUTO: 0.5 %
BILIRUB SERPL-MCNC: 0.3 MG/DL (ref 0.2–1.3)
BUN SERPL-MCNC: 18 MG/DL (ref 7–30)
CALCIUM SERPL-MCNC: 8.9 MG/DL (ref 8.5–10.1)
CHLORIDE SERPL-SCNC: 103 MMOL/L (ref 94–109)
CO2 SERPL-SCNC: 29 MMOL/L (ref 20–32)
CREAT SERPL-MCNC: 0.7 MG/DL (ref 0.52–1.04)
DIFFERENTIAL METHOD BLD: NORMAL
EOSINOPHIL # BLD AUTO: 0.1 10E9/L (ref 0–0.7)
EOSINOPHIL NFR BLD AUTO: 0.6 %
ERYTHROCYTE [DISTWIDTH] IN BLOOD BY AUTOMATED COUNT: 13.6 % (ref 10–15)
GFR SERPL CREATININE-BSD FRML MDRD: >90 ML/MIN/{1.73_M2}
GLUCOSE SERPL-MCNC: 92 MG/DL (ref 70–99)
HCT VFR BLD AUTO: 44 % (ref 35–47)
HGB BLD-MCNC: 14.7 G/DL (ref 11.7–15.7)
LYMPHOCYTES # BLD AUTO: 1.6 10E9/L (ref 0.8–5.3)
LYMPHOCYTES NFR BLD AUTO: 20.3 %
MCH RBC QN AUTO: 30.7 PG (ref 26.5–33)
MCHC RBC AUTO-ENTMCNC: 33.4 G/DL (ref 31.5–36.5)
MCV RBC AUTO: 92 FL (ref 78–100)
MONOCYTES # BLD AUTO: 0.4 10E9/L (ref 0–1.3)
MONOCYTES NFR BLD AUTO: 5.3 %
NEUTROPHILS # BLD AUTO: 5.8 10E9/L (ref 1.6–8.3)
NEUTROPHILS NFR BLD AUTO: 73.3 %
PLATELET # BLD AUTO: 312 10E9/L (ref 150–450)
POTASSIUM SERPL-SCNC: 4.4 MMOL/L (ref 3.4–5.3)
PROT SERPL-MCNC: 7.5 G/DL (ref 6.8–8.8)
RBC # BLD AUTO: 4.79 10E12/L (ref 3.8–5.2)
SODIUM SERPL-SCNC: 137 MMOL/L (ref 133–144)
WBC # BLD AUTO: 7.9 10E9/L (ref 4–11)

## 2019-04-01 PROCEDURE — 36415 COLL VENOUS BLD VENIPUNCTURE: CPT | Performed by: DERMATOLOGY

## 2019-04-01 PROCEDURE — 99213 OFFICE O/P EST LOW 20 MIN: CPT | Performed by: DERMATOLOGY

## 2019-04-01 PROCEDURE — 85025 COMPLETE CBC W/AUTO DIFF WBC: CPT | Performed by: DERMATOLOGY

## 2019-04-01 PROCEDURE — 80053 COMPREHEN METABOLIC PANEL: CPT | Performed by: DERMATOLOGY

## 2019-04-01 RX ORDER — METHOTREXATE 2.5 MG/1
TABLET ORAL
Qty: 85 TABLET | Refills: 1 | Status: SHIPPED | OUTPATIENT
Start: 2019-04-01 | End: 2019-10-08

## 2019-04-01 RX ORDER — BETAMETHASONE DIPROPIONATE 0.5 MG/G
CREAM TOPICAL
Qty: 100 G | Refills: 3 | Status: SHIPPED | OUTPATIENT
Start: 2019-04-01 | End: 2019-10-08

## 2019-04-01 RX ORDER — FLUOCINOLONE ACETONIDE 0.25 MG/G
CREAM TOPICAL 2 TIMES DAILY
Qty: 60 G | Refills: 3 | Status: SHIPPED | OUTPATIENT
Start: 2019-04-01 | End: 2019-10-08

## 2019-04-01 NOTE — NURSING NOTE
"Initial /86   Pulse 85   LMP 04/03/2007   SpO2 98%  Estimated body mass index is 20.25 kg/m  as calculated from the following:    Height as of 1/14/19: 1.727 m (5' 8\").    Weight as of 1/14/19: 60.4 kg (133 lb 3.2 oz). .      "

## 2019-04-01 NOTE — PROGRESS NOTES
Ayala Wilson is a 59 year old year old female patient here today for psoriasis.  She has only had one dose of methotrexate.  She notes rahs on face and neck and itching. 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  No issues with methotrexate test dose.  Patient reports the following modifying factors none.  Associated symptoms: none.  Patient has no other skin complaints today.  Remainder of the HPI, Meds, PMH, Allergies, FH, and SH was reviewed in chart.      Past Medical History:   Diagnosis Date     Contact dermatitis and other eczema, due to unspecified cause      Tobacco use disorder        Past Surgical History:   Procedure Laterality Date     SURGICAL HISTORY OF -   1974    appendectomy        Family History   Problem Relation Age of Onset     Cerebrovascular Disease Mother         CVA     Cancer Father         bone     Gastrointestinal Disease Daughter         crohn's       Social History     Socioeconomic History     Marital status:      Spouse name: Not on file     Number of children: Not on file     Years of education: Not on file     Highest education level: Not on file   Occupational History     Occupation:      Employer: [employer]     Comment: 18+ years (in 2018)   Social Needs     Financial resource strain: Not on file     Food insecurity:     Worry: Not on file     Inability: Not on file     Transportation needs:     Medical: Not on file     Non-medical: Not on file   Tobacco Use     Smoking status: Current Some Day Smoker     Packs/day: 1.00     Years: 30.00     Pack years: 30.00     Types: Cigarettes     Smokeless tobacco: Never Used   Substance and Sexual Activity     Alcohol use: No     Drug use: No     Sexual activity: Yes     Partners: Male   Lifestyle     Physical activity:     Days per week: Not on file     Minutes per session: Not on file     Stress: Not on file   Relationships     Social connections:     Talks on phone: Not on file      Gets together: Not on file     Attends Congregational service: Not on file     Active member of club or organization: Not on file     Attends meetings of clubs or organizations: Not on file     Relationship status:      Intimate partner violence:     Fear of current or ex partner: Not on file     Emotionally abused: Not on file     Physically abused: Not on file     Forced sexual activity: Not on file   Other Topics Concern     Parent/sibling w/ CABG, MI or angioplasty before 65F 55M? No   Social History Narrative    01/14/19    Patient lives alone.       Outpatient Encounter Medications as of 4/1/2019   Medication Sig Dispense Refill     augmented betamethasone dipropionate (DIPROLENE-AF) 0.05 % external cream Apply sparingly to affected area twice daily as needed.  Do not apply to face. On hands 100 g 3     calcipotriene-betamethasone dipropionate (ENSTILAR) 0.005-0.064 % external foam Apply topically daily       fluocinolone (SYNALAR) 0.025 % cream Apply topically 2 times daily To face and neck 60 g 3     folic acid (FOLVITE) 1 MG tablet Take 1 tablet (1 mg) by mouth daily Except day of methotrecate 90 tablet 3     ixekizumab (TALTZ) 80 MG/ML SOAJ auto-injector Inject 80 mg Subcutaneous every 28 days       LEVETIRACETAM PO Take 1,000 mg by mouth 2 times daily       methotrexate sodium 2.5 MG TABS 7 tablets once per week 85 tablet 1     methotrexate sodium 2.5 MG TABS 4 tablets by mouth once per week 4 tablet 0     No facility-administered encounter medications on file as of 4/1/2019.              Review Of Systems  Skin: As above  Eyes: negative  Ears/Nose/Throat: negative  Respiratory: No shortness of breath, dyspnea on exertion, cough, or hemoptysis  Cardiovascular: negative  Gastrointestinal: negative  Genitourinary: negative  Musculoskeletal: negative  Neurologic: negative  Psychiatric: negative  Hematologic/Lymphatic/Immunologic: negative  Endocrine: negative      O:   NAD, WDWN, Alert & Oriented, Mood &  Affect wnl, Vitals stable   Here today alone   /86   Pulse 85   LMP 04/03/2007   SpO2 98%    General appearance normal   Vitals stable   Alert, oriented and in no acute distress     Fissured eczematous plaues on face and neck and arms   Hands with eczematous plaques     The remainder of expanded problem focused exam was unremarkable; the following areas were examined:  scalp/hair, conjunctiva/lids, face, neck, lips, chest, digits/nails, RUE, LUE.      Eyes: Conjunctivae/lids:Normal     ENT: Lips, buccal mucosa, tongue: normal    MSK:Normal    Cardiovascular: peripheral edema none    Pulm: Breathing Normal    Lymph Nodes: No Head and Neck Lymphadenopathy     Neuro/Psych: Orientation:Normal; Mood/Affect:Normal      A/P:  1. 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 Dial, zest, irsih spring; Mild soaps such as Cetaphil or Dove sensitive skin, avoid hot or cold showers, aggressive use of emollients including vanicream, cetaphil or cerave discussed with patient.

## 2019-04-01 NOTE — LETTER
4/1/2019         RE: Ayala Wilson  [address] [facility] [address] [facility] [address]        Dear Colleague,    Thank you for referring your patient, Ayala Wilson, to the [facility]. Please see a copy of my visit note below.    Ayala Wilson is a 59 year old year old female patient here today for psoriasis.  She has only had one dose of methotrexate.  She notes rahs on face and neck and itching. 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  No issues with methotrexate test dose.  Patient reports the following modifying factors none.  Associated symptoms: none.  Patient has no other skin complaints today.  Remainder of the HPI, Meds, PMH, Allergies, FH, and SH was reviewed in chart.      Past Medical History:   Diagnosis Date     Contact dermatitis and other eczema, due to unspecified cause      Tobacco use disorder        Past Surgical History:   Procedure Laterality Date     SURGICAL HISTORY OF -   1974    appendectomy        Family History   Problem Relation Age of Onset     Cerebrovascular Disease Mother         CVA     Cancer Father         bone     Gastrointestinal Disease Daughter         crohn's       Social History     Socioeconomic History     Marital status:      Spouse name: Not on file     Number of children: Not on file     Years of education: Not on file     Highest education level: Not on file   Occupational History     Occupation:      Employer: [employer]     Comment: 18+ years (in 2018)   Social Needs     Financial resource strain: Not on file     Food insecurity:     Worry: Not on file     Inability: Not on file     Transportation needs:     Medical: Not on file     Non-medical: Not on file   Tobacco Use     Smoking status: Current Some Day Smoker     Packs/day: 1.00     Years: 30.00     Pack years: 30.00     Types: Cigarettes     Smokeless tobacco: Never Used   Substance and Sexual Activity     Alcohol use: No     Drug use: No      Sexual activity: Yes     Partners: Male   Lifestyle     Physical activity:     Days per week: Not on file     Minutes per session: Not on file     Stress: Not on file   Relationships     Social connections:     Talks on phone: Not on file     Gets together: Not on file     Attends Yazdanism service: Not on file     Active member of club or organization: Not on file     Attends meetings of clubs or organizations: Not on file     Relationship status:      Intimate partner violence:     Fear of current or ex partner: Not on file     Emotionally abused: Not on file     Physically abused: Not on file     Forced sexual activity: Not on file   Other Topics Concern     Parent/sibling w/ CABG, MI or angioplasty before 65F 55M? No   Social History Narrative    01/14/19    Patient lives alone.       Outpatient Encounter Medications as of 4/1/2019   Medication Sig Dispense Refill     augmented betamethasone dipropionate (DIPROLENE-AF) 0.05 % external cream Apply sparingly to affected area twice daily as needed.  Do not apply to face. On hands 100 g 3     calcipotriene-betamethasone dipropionate (ENSTILAR) 0.005-0.064 % external foam Apply topically daily       fluocinolone (SYNALAR) 0.025 % cream Apply topically 2 times daily To face and neck 60 g 3     folic acid (FOLVITE) 1 MG tablet Take 1 tablet (1 mg) by mouth daily Except day of methotrecate 90 tablet 3     ixekizumab (TALTZ) 80 MG/ML SOAJ auto-injector Inject 80 mg Subcutaneous every 28 days       LEVETIRACETAM PO Take 1,000 mg by mouth 2 times daily       methotrexate sodium 2.5 MG TABS 7 tablets once per week 85 tablet 1     methotrexate sodium 2.5 MG TABS 4 tablets by mouth once per week 4 tablet 0     No facility-administered encounter medications on file as of 4/1/2019.              Review Of Systems  Skin: As above  Eyes: negative  Ears/Nose/Throat: negative  Respiratory: No shortness of breath, dyspnea on exertion, cough, or hemoptysis  Cardiovascular:  negative  Gastrointestinal: negative  Genitourinary: negative  Musculoskeletal: negative  Neurologic: negative  Psychiatric: negative  Hematologic/Lymphatic/Immunologic: negative  Endocrine: negative      O:   NAD, WDWN, Alert & Oriented, Mood & Affect wnl, Vitals stable   Here today alone   /86   Pulse 85   LMP 04/03/2007   SpO2 98%    General appearance normal   Vitals stable   Alert, oriented and in no acute distress     Fissured eczematous plaues on face and neck and arms   Hands with eczematous plaques     The remainder of expanded problem focused exam was unremarkable; the following areas were examined:  scalp/hair, conjunctiva/lids, face, neck, lips, chest, digits/nails, RUE, LUE.      Eyes: Conjunctivae/lids:Normal     ENT: Lips, buccal mucosa, tongue: normal    MSK:Normal    Cardiovascular: peripheral edema none    Pulm: Breathing Normal    Lymph Nodes: No Head and Neck Lymphadenopathy     Neuro/Psych: Orientation:Normal; Mood/Affect:Normal      A/P:  1. 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 Dial, zest, irsih spring; Mild soaps such as Cetaphil or Dove sensitive skin, avoid hot or cold showers, aggressive use of emollients including vanicream, cetaphil or cerave discussed with patient.        Again, thank you for allowing me to participate in the care of your patient.        Sincerely,        Vijay Cox MD

## 2019-04-02 ENCOUNTER — TELEPHONE (OUTPATIENT)
Dept: DERMATOLOGY | Facility: CLINIC | Age: 59
End: 2019-04-02

## 2019-04-02 NOTE — TELEPHONE ENCOUNTER
Reason for Call:  Other prescription    Detailed comments: pt calling stating she was on methotrexate 4 tabs weekly. Her new rx is for 7 tabs weekly, which dosing should she be taking? How often should she be taking the folic acid?     Phone Number Patient can be reached at: Home number on file [phone] (home)    Best Time: any     Can we leave a detailed message on this number? YES    Call taken on 4/2/2019 at 8:58 AM by Gwen Brar

## 2019-04-02 NOTE — TELEPHONE ENCOUNTER
Pt notified of below.  Pt reports understanding.  Pt does not have further questions or concerns.    Mi Serra   [facility]  RN

## 2019-04-02 NOTE — TELEPHONE ENCOUNTER
Patient has completed one time test dose of 4 tabs of Methotrexate per chart review. She is now to be starting once weekly dosing of 7 tabs (taken all at once) of Methotrexate once a week. She should take Folic acid once a day  as it helps lessen side effects of Methotrexate.     Has 8 week follow up appt scheduled, so should be all set. Message left to return call. Daksha Em RN

## 2019-05-15 ENCOUNTER — OFFICE VISIT (OUTPATIENT)
Dept: DERMATOLOGY | Facility: CLINIC | Age: 59
End: 2019-05-15
Payer: COMMERCIAL

## 2019-05-15 VITALS — HEART RATE: 67 BPM | SYSTOLIC BLOOD PRESSURE: 124 MMHG | DIASTOLIC BLOOD PRESSURE: 82 MMHG | OXYGEN SATURATION: 98 %

## 2019-05-15 DIAGNOSIS — L30.9 DERMATITIS: Primary | ICD-10-CM

## 2019-05-15 PROCEDURE — 99213 OFFICE O/P EST LOW 20 MIN: CPT | Mod: 25 | Performed by: DERMATOLOGY

## 2019-05-15 PROCEDURE — 88305 TISSUE EXAM BY PATHOLOGIST: CPT | Mod: TC | Performed by: DERMATOLOGY

## 2019-05-15 PROCEDURE — 88312 SPECIAL STAINS GROUP 1: CPT | Mod: TC | Performed by: DERMATOLOGY

## 2019-05-15 PROCEDURE — 11102 TANGNTL BX SKIN SINGLE LES: CPT | Performed by: DERMATOLOGY

## 2019-05-15 NOTE — LETTER
5/15/2019         RE: Ayala Wilson  [address] [facility] [address]        Dear Colleague,    Thank you for referring your patient, Ayala Wilson, to the [facility]. Please see a copy of my visit note below.    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  Patient reports the following modifying factors none.  Associated symptoms: none.  Patient has no other skin complaints today.  Remainder of the HPI, Meds, PMH, Allergies, FH, and SH was reviewed in chart.      Past Medical History:   Diagnosis Date     Contact dermatitis and other eczema, due to unspecified cause      Tobacco use disorder        Past Surgical History:   Procedure Laterality Date     SURGICAL HISTORY OF -   1974    appendectomy        Family History   Problem Relation Age of Onset     Cerebrovascular Disease Mother         CVA     Cancer Father         bone     Gastrointestinal Disease Daughter         crohn's       Social History     Socioeconomic History     Marital status:      Spouse name: Not on file     Number of children: Not on file     Years of education: Not on file     Highest education level: Not on file   Occupational History     Occupation:      Employer: [employer]     Comment: 18+ years (in 2018)   Social Needs     Financial resource strain: Not on file     Food insecurity:     Worry: Not on file     Inability: Not on file     Transportation needs:     Medical: Not on file     Non-medical: Not on file   Tobacco Use     Smoking status: Current Some Day Smoker     Packs/day: 1.00     Years: 30.00     Pack years: 30.00     Types: Cigarettes     Smokeless tobacco: Never Used   Substance and Sexual Activity     Alcohol use: No     Drug use: No     Sexual activity: Yes     Partners:  Male   Lifestyle     Physical activity:     Days per week: Not on file     Minutes per session: Not on file     Stress: Not on file   Relationships     Social connections:     Talks on phone: Not on file     Gets together: Not on file     Attends Sikh service: Not on file     Active member of club or organization: Not on file     Attends meetings of clubs or organizations: Not on file     Relationship status:      Intimate partner violence:     Fear of current or ex partner: Not on file     Emotionally abused: Not on file     Physically abused: Not on file     Forced sexual activity: Not on file   Other Topics Concern     Parent/sibling w/ CABG, MI or angioplasty before 65F 55M? No   Social History Narrative    01/14/19    Patient lives alone.       Outpatient Encounter Medications as of 5/15/2019   Medication Sig Dispense Refill     augmented betamethasone dipropionate (DIPROLENE-AF) 0.05 % external cream Apply sparingly to affected area twice daily as needed.  Do not apply to face. On hands 100 g 3     calcipotriene-betamethasone dipropionate (ENSTILAR) 0.005-0.064 % external foam Apply topically daily       fluocinolone (SYNALAR) 0.025 % cream Apply topically 2 times daily To face and neck 60 g 3     folic acid (FOLVITE) 1 MG tablet Take 1 tablet (1 mg) by mouth daily Except day of methotrecate 90 tablet 3     LEVETIRACETAM PO Take 1,000 mg by mouth 2 times daily       methotrexate sodium 2.5 MG TABS 7 tablets once per week 85 tablet 1     [DISCONTINUED] ixekizumab (TALTZ) 80 MG/ML SOAJ auto-injector Inject 80 mg Subcutaneous every 28 days       No facility-administered encounter medications on file as of 5/15/2019.              Review Of Systems  Skin: As above  Eyes: negative  Ears/Nose/Throat: negative  Respiratory: No shortness of breath, dyspnea on exertion, cough, or hemoptysis  Cardiovascular: negative  Gastrointestinal: negative  Genitourinary: negative  Musculoskeletal: negative  Neurologic:  negative  Psychiatric: negative  Hematologic/Lymphatic/Immunologic: negative  Endocrine: negative      O:   NAD, WDWN, Alert & Oriented, Mood & Affect wnl, Vitals stable   Here today alone   /82   Pulse 67   LMP 04/03/2007   SpO2 98%    General appearance normal   Vitals stable   Alert, oriented and in no acute distress     Eczematous plaques on face, neck  Fissured plaques on hands      The remainder of expanded problem focused exam was unremarkable; the following areas were examined:  scalp/hair, conjunctiva/lids, face, neck, lips, chest, digits/nails, RUE, LUE.      Eyes: Conjunctivae/lids:Normal     ENT: Lips, buccal mucosa, tongue: normal    MSK:Normal    Cardiovascular: peripheral edema none    Pulm: Breathing Normal    Lymph Nodes: No Head and Neck Lymphadenopathy     Neuro/Psych: Orientation:Normal; Mood/Affect:Normal      A/P:  1. Today clinically I favor spong derm process, however she does have joint pain  She has been on a number of different meds in the psat  I discussed with patient repeat bx today to consider dupixient if spong derm is favored  L volar wrist  TANGENTIAL BIOPSY SENT OUT:  After consent, anesthesia with LEC and prep, tangential excision performed and specimen sent out for permanent section histology.  No complications and routine wound care. Patient told to call our office in 1-2 weeks for result.       Will wait on alternative plan until we get resultsback  Patient agrees  Return to clinic pending path       Again, thank you for allowing me to participate in the care of your patient.        Sincerely,        Vijay Cox MD

## 2019-05-15 NOTE — NURSING NOTE
"Initial /82   Pulse 67   LMP 04/03/2007   SpO2 98%  Estimated body mass index is 20.25 kg/m  as calculated from the following:    Height as of 1/14/19: 1.727 m (5' 8\").    Weight as of 1/14/19: 60.4 kg (133 lb 3.2 oz). .    Genesis Biggs LPN    "

## 2019-05-15 NOTE — PROGRESS NOTES
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  Patient reports the following modifying factors none.  Associated symptoms: none.  Patient has no other skin complaints today.  Remainder of the HPI, Meds, PMH, Allergies, FH, and SH was reviewed in chart.      Past Medical History:   Diagnosis Date     Contact dermatitis and other eczema, due to unspecified cause      Tobacco use disorder        Past Surgical History:   Procedure Laterality Date     SURGICAL HISTORY OF -   1974    appendectomy        Family History   Problem Relation Age of Onset     Cerebrovascular Disease Mother         CVA     Cancer Father         bone     Gastrointestinal Disease Daughter         crohn's       Social History     Socioeconomic History     Marital status:      Spouse name: Not on file     Number of children: Not on file     Years of education: Not on file     Highest education level: Not on file   Occupational History     Occupation:      Employer: [employer]     Comment: 18+ years (in 2018)   Social Needs     Financial resource strain: Not on file     Food insecurity:     Worry: Not on file     Inability: Not on file     Transportation needs:     Medical: Not on file     Non-medical: Not on file   Tobacco Use     Smoking status: Current Some Day Smoker     Packs/day: 1.00     Years: 30.00     Pack years: 30.00     Types: Cigarettes     Smokeless tobacco: Never Used   Substance and Sexual Activity     Alcohol use: No     Drug use: No     Sexual activity: Yes     Partners: Male   Lifestyle     Physical activity:     Days per week: Not on file     Minutes per session: Not on file     Stress: Not on file   Relationships     Social connections:     Talks on phone: Not on file     Gets together: Not on file     Attends  Orthodoxy service: Not on file     Active member of club or organization: Not on file     Attends meetings of clubs or organizations: Not on file     Relationship status:      Intimate partner violence:     Fear of current or ex partner: Not on file     Emotionally abused: Not on file     Physically abused: Not on file     Forced sexual activity: Not on file   Other Topics Concern     Parent/sibling w/ CABG, MI or angioplasty before 65F 55M? No   Social History Narrative    01/14/19    Patient lives alone.       Outpatient Encounter Medications as of 5/15/2019   Medication Sig Dispense Refill     augmented betamethasone dipropionate (DIPROLENE-AF) 0.05 % external cream Apply sparingly to affected area twice daily as needed.  Do not apply to face. On hands 100 g 3     calcipotriene-betamethasone dipropionate (ENSTILAR) 0.005-0.064 % external foam Apply topically daily       fluocinolone (SYNALAR) 0.025 % cream Apply topically 2 times daily To face and neck 60 g 3     folic acid (FOLVITE) 1 MG tablet Take 1 tablet (1 mg) by mouth daily Except day of methotrecate 90 tablet 3     LEVETIRACETAM PO Take 1,000 mg by mouth 2 times daily       methotrexate sodium 2.5 MG TABS 7 tablets once per week 85 tablet 1     [DISCONTINUED] ixekizumab (TALTZ) 80 MG/ML SOAJ auto-injector Inject 80 mg Subcutaneous every 28 days       No facility-administered encounter medications on file as of 5/15/2019.              Review Of Systems  Skin: As above  Eyes: negative  Ears/Nose/Throat: negative  Respiratory: No shortness of breath, dyspnea on exertion, cough, or hemoptysis  Cardiovascular: negative  Gastrointestinal: negative  Genitourinary: negative  Musculoskeletal: negative  Neurologic: negative  Psychiatric: negative  Hematologic/Lymphatic/Immunologic: negative  Endocrine: negative      O:   NAD, WDWN, Alert & Oriented, Mood & Affect wnl, Vitals stable   Here today alone   /82   Pulse 67   LMP 04/03/2007   SpO2 98%     General appearance normal   Vitals stable   Alert, oriented and in no acute distress     Eczematous plaques on face, neck  Fissured plaques on hands      The remainder of expanded problem focused exam was unremarkable; the following areas were examined:  scalp/hair, conjunctiva/lids, face, neck, lips, chest, digits/nails, RUE, LUE.      Eyes: Conjunctivae/lids:Normal     ENT: Lips, buccal mucosa, tongue: normal    MSK:Normal    Cardiovascular: peripheral edema none    Pulm: Breathing Normal    Lymph Nodes: No Head and Neck Lymphadenopathy     Neuro/Psych: Orientation:Normal; Mood/Affect:Normal      A/P:  1. Today clinically I favor spong derm process, however she does have joint pain  She has been on a number of different meds in the psat  I discussed with patient repeat bx today to consider dupixient if spong derm is favored  L volar wrist  TANGENTIAL BIOPSY SENT OUT:  After consent, anesthesia with LEC and prep, tangential excision performed and specimen sent out for permanent section histology.  No complications and routine wound care. Patient told to call our office in 1-2 weeks for result.       Will wait on alternative plan until we get resultsback  Patient agrees  Return to clinic pending path

## 2019-05-20 LAB — COPATH REPORT: NORMAL

## 2019-05-21 ENCOUNTER — TELEPHONE (OUTPATIENT)
Dept: DERMATOLOGY | Facility: CLINIC | Age: 59
End: 2019-05-21

## 2019-05-21 ENCOUNTER — NURSE TRIAGE (OUTPATIENT)
Dept: NURSING | Facility: CLINIC | Age: 59
End: 2019-05-21

## 2019-05-21 NOTE — TELEPHONE ENCOUNTER
PA Initiation    Medication: dupixent pa pending   Insurance Company: [facility] - Phone [phone] Fax [phone]  [facility] [place]  Filling Pharmacy Phone:    Filling Pharmacy Fax:    Start Date: 5/21/2019

## 2019-05-21 NOTE — TELEPHONE ENCOUNTER
Wants to talk with Derm RN who said there was a message for the patient. I couldn't find anything listed in encoutners or Notes with a message to relay. Patient will call back at 8 a.m. When the clinic has their phones turned on.  Mercy Oneal RN-[facility]

## 2019-05-21 NOTE — TELEPHONE ENCOUNTER
Reason for Call:  Other prescription    Detailed comments: pt calling stating she will be stating the Dupixent, she would like to know if this is an autoinject kind of medication or not. She will be at work and wood snot want to discuss in great detail just needs to know yes or no if this comes in auto inject.     Phone Number Patient can be reached at: Work number on file:  [phone] (work)    Best Time: any     Can we leave a detailed message on this number? YES    Call taken on 5/21/2019 at 8:10 AM by Gwen Brar

## 2019-05-21 NOTE — TELEPHONE ENCOUNTER
Spoke to patient and per Dupixient instructions, comes in a spring loaded pre filled syringe. Patient verbalized understanding. Daksha Em RN

## 2019-05-23 NOTE — TELEPHONE ENCOUNTER
Prior Authorization Approval    Authorization Effective Date: 5/22/2019  Authorization Expiration Date: 9/22/2019  Medication: dupixent pa approved  Approved Dose/Quantity: loading and maintenance  Reference #: [id number]   Insurance Company: [facility] [employer] - Phone [phone] Fax [phone]  Expected CoPay: na     CoPay Card Available: Yes    Foundation Assistance Needed: na  Which Pharmacy is filling the prescription (Not needed for infusion/clinic administered): [facility] - [facility] [address] [facility]  Pharmacy Notified: Yes  Patient Notified: Yes

## 2019-05-28 ENCOUNTER — TELEPHONE (OUTPATIENT)
Dept: DERMATOLOGY | Facility: CLINIC | Age: 59
End: 2019-05-28

## 2019-05-28 DIAGNOSIS — L20.9 ATOPIC DERMATITIS, UNSPECIFIED TYPE: Primary | ICD-10-CM

## 2019-05-28 NOTE — TELEPHONE ENCOUNTER
[facility]:  Requesting 2 separate prescriptions for the Dupixent (starter dose and maintenance dose)    Please call pharmacist @:  [phone]    Denise Behrendt  Specialty CSS

## 2019-05-28 NOTE — TELEPHONE ENCOUNTER
"Appears PA had went through. Order appears \"\". New order pended. Please review and sign if appropriate. Patient very anxious and upset with amount of time taken to start.     Thank you,   Kyle NICOLAS RN   Specialty Clinics   "

## 2019-05-28 NOTE — TELEPHONE ENCOUNTER
Pt calling to check on status of this request and wants to know when it will be straightened out. States she has a rash all over face, hands, wrists, neck, chin; eye is swollen - states this is because she has not been able to start her medicine yet.     Please call pt @:  [phone] (until 4:30 today)    Cell #:  [phone]    Ok to leave message on either phone #    Denise Behrendt  Specialty CSS

## 2019-05-31 DIAGNOSIS — L20.9 ATOPIC DERMATITIS, UNSPECIFIED TYPE: ICD-10-CM

## 2019-05-31 NOTE — TELEPHONE ENCOUNTER
Reason for Call:  Medication or medication refill:    Do you use a [facility]?  Name of the pharmacy and phone number for the current request:  [facility] [phone]    Name of the medication requested: DUPIXENT    Other request: LOV:  5/15/19  LAST REFILL:  5/29/19    Can we leave a detailed message on this number? YES    Phone number patient can be reached at: Home number on file [phone] (home)    Best Time: any     Call taken on 5/31/2019 at 9:15 AM by Gwen Brar

## 2019-06-10 ENCOUNTER — TELEPHONE (OUTPATIENT)
Dept: DERMATOLOGY | Facility: CLINIC | Age: 59
End: 2019-06-10

## 2019-06-10 NOTE — TELEPHONE ENCOUNTER
"Spoke to patient who last used Dupixient \"at the end of May\".     \"I had what looked like a sunburn on hands and my neck and my face that was itchy and my skin peeled in spots...\"    \"It has gotten better, but it is still itchy and my hands swell and I had swelling below my eye, too..\"     Please advise. I advised her not to use any more Dupixient until Provider can be contacted, but I suspect she should be seen since likely allergic reaction symptoms have not cleared...    Daksha Em RN    "

## 2019-06-11 ENCOUNTER — OFFICE VISIT (OUTPATIENT)
Dept: DERMATOLOGY | Facility: CLINIC | Age: 59
End: 2019-06-11
Payer: COMMERCIAL

## 2019-06-11 VITALS — SYSTOLIC BLOOD PRESSURE: 127 MMHG | DIASTOLIC BLOOD PRESSURE: 75 MMHG | HEART RATE: 79 BPM | OXYGEN SATURATION: 92 %

## 2019-06-11 DIAGNOSIS — L20.81 ATOPIC NEURODERMATITIS: Primary | ICD-10-CM

## 2019-06-11 PROCEDURE — 99213 OFFICE O/P EST LOW 20 MIN: CPT | Performed by: DERMATOLOGY

## 2019-06-11 RX ORDER — PREDNISONE 20 MG/1
TABLET ORAL
Qty: 30 TABLET | Refills: 1 | Status: SHIPPED | OUTPATIENT
Start: 2019-06-11 | End: 2019-11-26

## 2019-06-11 RX ORDER — HYDROXYZINE HYDROCHLORIDE 25 MG/1
25 TABLET, FILM COATED ORAL
Qty: 60 TABLET | Refills: 3 | Status: SHIPPED | OUTPATIENT
Start: 2019-06-11 | End: 2021-08-13

## 2019-06-11 NOTE — PROGRESS NOTES
Ayala Wilson is a 59 year old year old female patient here today for rash on arms and neck and hands after second dose of dupixne.t  It was very itchy.  It is clearig now.  Patient reports the following modifying factors none.  Associated symptoms: none.  Patient has no other skin complaints today.  Remainder of the HPI, Meds, PMH, Allergies, FH, and SH was reviewed in chart.      Past Medical History:   Diagnosis Date     Contact dermatitis and other eczema, due to unspecified cause      Tobacco use disorder        Past Surgical History:   Procedure Laterality Date     SURGICAL HISTORY OF -   1974    appendectomy        Family History   Problem Relation Age of Onset     Cerebrovascular Disease Mother         CVA     Cancer Father         bone     Gastrointestinal Disease Daughter         crohn's       Social History     Socioeconomic History     Marital status:      Spouse name: Not on file     Number of children: Not on file     Years of education: Not on file     Highest education level: Not on file   Occupational History     Occupation:      Employer: [employer]     Comment: 18+ years (in 2018)   Social Needs     Financial resource strain: Not on file     Food insecurity:     Worry: Not on file     Inability: Not on file     Transportation needs:     Medical: Not on file     Non-medical: Not on file   Tobacco Use     Smoking status: Current Some Day Smoker     Packs/day: 1.00     Years: 30.00     Pack years: 30.00     Types: Cigarettes     Smokeless tobacco: Never Used   Substance and Sexual Activity     Alcohol use: No     Drug use: No     Sexual activity: Yes     Partners: Male   Lifestyle     Physical activity:     Days per week: Not on file     Minutes per session: Not on file     Stress: Not on file   Relationships     Social connections:     Talks on phone: Not on file     Gets together: Not on file     Attends Baptism service: Not on file     Active member of club or  organization: Not on file     Attends meetings of clubs or organizations: Not on file     Relationship status:      Intimate partner violence:     Fear of current or ex partner: Not on file     Emotionally abused: Not on file     Physically abused: Not on file     Forced sexual activity: Not on file   Other Topics Concern     Parent/sibling w/ CABG, MI or angioplasty before 65F 55M? No   Social History Narrative    19    Patient lives alone.       Outpatient Encounter Medications as of 2019   Medication Sig Dispense Refill     augmented betamethasone dipropionate (DIPROLENE-AF) 0.05 % external cream Apply sparingly to affected area twice daily as needed.  Do not apply to face. On hands 100 g 3     calcipotriene-betamethasone dipropionate (ENSTILAR) 0.005-0.064 % external foam Apply topically daily       Dupilumab (DUPIXENT) 300 MG/2ML syringe Inject 2 mLs (300 mg) Subcutaneous every 14 days Maintenance dose. To be given after initial 600 mg dose. 2 mL 3     [] Dupilumab (DUPIXENT) 300 MG/2ML syringe Inject 4 mLs (600 mg) Subcutaneous once for 1 dose 4 mL 0     [] Dupilumab (DUPIXENT) 300 MG/2ML syringe Inject 2 mLs (300 mg) Subcutaneous once for 1 dose 600 mg in different injection sites, followed by 300 mg given every other week 4 mL 3     fluocinolone (SYNALAR) 0.025 % cream Apply topically 2 times daily To face and neck 60 g 3     folic acid (FOLVITE) 1 MG tablet Take 1 tablet (1 mg) by mouth daily Except day of methotrecate 90 tablet 3     LEVETIRACETAM PO Take 1,000 mg by mouth 2 times daily       methotrexate sodium 2.5 MG TABS 7 tablets once per week 85 tablet 1     No facility-administered encounter medications on file as of 2019.              Review Of Systems  Skin: As above  Eyes: negative  Ears/Nose/Throat: negative  Respiratory: No shortness of breath, dyspnea on exertion, cough, or hemoptysis  Cardiovascular: negative  Gastrointestinal: negative  Genitourinary:  negative  Musculoskeletal: negative  Neurologic: negative  Psychiatric: negative  Hematologic/Lymphatic/Immunologic: negative  Endocrine: negative      O:   NAD, WDWN, Alert & Oriented, Mood & Affect wnl, Vitals stable   Here today alone   LMP 04/03/2007    General appearance normal   Vitals stable   Alert, oriented and in no acute distress     Red scaly patches on neck, face, arms and hands      The remainder of expanded problem focused exam was unremarkable; the following areas were examined:  scalp/hair, conjunctiva/lids, face, neck, lips, chest, digits/nails, RUE, LUE.      Eyes: Conjunctivae/lids:Normal     ENT: Lips, buccal mucosa, tongue: normal    MSK:Normal    Cardiovascular: peripheral edema none    Pulm: Breathing Normal    Lymph Nodes: No Head and Neck Lymphadenopathy     Neuro/Psych: Orientation:Normal; Mood/Affect:Normal      A/P:  1. Atopic derm   ? Allergic reaction to dupixient 1% of the time this can be seen  Stopping dupixient discussed with patient however she thinks she was clearing on hands  Risk of repeat rash discussed with patient   Trying to pre treat with prednisone and hydroxyzine discussed with patient   She is schedule Thursday for injection will try to pre treat and she will call us thrusday and firday

## 2019-06-11 NOTE — LETTER
6/11/2019         RE: Ayala Wilson  [address] [address] [address]        Dear Colleague,    Thank you for referring your patient, Ayala Wilson, to the [facility]. Please see a copy of my visit note below.    Ayala Wilson is a 59 year old year old female patient here today for rash on arms and neck and hands after second dose of dupixne.t  It was very itchy.  It is clearig now.  Patient reports the following modifying factors none.  Associated symptoms: none.  Patient has no other skin complaints today.  Remainder of the HPI, Meds, PMH, Allergies, FH, and SH was reviewed in chart.      Past Medical History:   Diagnosis Date     Contact dermatitis and other eczema, due to unspecified cause      Tobacco use disorder        Past Surgical History:   Procedure Laterality Date     SURGICAL HISTORY OF -   1974    appendectomy        Family History   Problem Relation Age of Onset     Cerebrovascular Disease Mother         CVA     Cancer Father         bone     Gastrointestinal Disease Daughter         crohn's       Social History     Socioeconomic History     Marital status:      Spouse name: Not on file     Number of children: Not on file     Years of education: Not on file     Highest education level: Not on file   Occupational History     Occupation:      Employer: [employer]     Comment: 18+ years (in 2018)   Social Needs     Financial resource strain: Not on file     Food insecurity:     Worry: Not on file     Inability: Not on file     Transportation needs:     Medical: Not on file     Non-medical: Not on file   Tobacco Use     Smoking status: Current Some Day Smoker     Packs/day: 1.00     Years: 30.00     Pack years: 30.00     Types: Cigarettes     Smokeless tobacco: Never Used   Substance and Sexual Activity     Alcohol use: No     Drug use: No     Sexual activity: Yes     Partners: Male   Lifestyle     Physical activity:     Days per week:  Not on file     Minutes per session: Not on file     Stress: Not on file   Relationships     Social connections:     Talks on phone: Not on file     Gets together: Not on file     Attends Anglican service: Not on file     Active member of club or organization: Not on file     Attends meetings of clubs or organizations: Not on file     Relationship status:      Intimate partner violence:     Fear of current or ex partner: Not on file     Emotionally abused: Not on file     Physically abused: Not on file     Forced sexual activity: Not on file   Other Topics Concern     Parent/sibling w/ CABG, MI or angioplasty before 65F 55M? No   Social History Narrative    19    Patient lives alone.       Outpatient Encounter Medications as of 2019   Medication Sig Dispense Refill     augmented betamethasone dipropionate (DIPROLENE-AF) 0.05 % external cream Apply sparingly to affected area twice daily as needed.  Do not apply to face. On hands 100 g 3     calcipotriene-betamethasone dipropionate (ENSTILAR) 0.005-0.064 % external foam Apply topically daily       Dupilumab (DUPIXENT) 300 MG/2ML syringe Inject 2 mLs (300 mg) Subcutaneous every 14 days Maintenance dose. To be given after initial 600 mg dose. 2 mL 3     [] Dupilumab (DUPIXENT) 300 MG/2ML syringe Inject 4 mLs (600 mg) Subcutaneous once for 1 dose 4 mL 0     [] Dupilumab (DUPIXENT) 300 MG/2ML syringe Inject 2 mLs (300 mg) Subcutaneous once for 1 dose 600 mg in different injection sites, followed by 300 mg given every other week 4 mL 3     fluocinolone (SYNALAR) 0.025 % cream Apply topically 2 times daily To face and neck 60 g 3     folic acid (FOLVITE) 1 MG tablet Take 1 tablet (1 mg) by mouth daily Except day of methotrecate 90 tablet 3     LEVETIRACETAM PO Take 1,000 mg by mouth 2 times daily       methotrexate sodium 2.5 MG TABS 7 tablets once per week 85 tablet 1     No facility-administered encounter medications on file as of  6/11/2019.              Review Of Systems  Skin: As above  Eyes: negative  Ears/Nose/Throat: negative  Respiratory: No shortness of breath, dyspnea on exertion, cough, or hemoptysis  Cardiovascular: negative  Gastrointestinal: negative  Genitourinary: negative  Musculoskeletal: negative  Neurologic: negative  Psychiatric: negative  Hematologic/Lymphatic/Immunologic: negative  Endocrine: negative      O:   NAD, WDWN, Alert & Oriented, Mood & Affect wnl, Vitals stable   Here today alone   LMP 04/03/2007    General appearance normal   Vitals stable   Alert, oriented and in no acute distress     Red scaly patches on neck, face, arms and hands      The remainder of expanded problem focused exam was unremarkable; the following areas were examined:  scalp/hair, conjunctiva/lids, face, neck, lips, chest, digits/nails, RUE, LUE.      Eyes: Conjunctivae/lids:Normal     ENT: Lips, buccal mucosa, tongue: normal    MSK:Normal    Cardiovascular: peripheral edema none    Pulm: Breathing Normal    Lymph Nodes: No Head and Neck Lymphadenopathy     Neuro/Psych: Orientation:Normal; Mood/Affect:Normal      A/P:  1. Atopic derm   ? Allergic reaction to dupixient 1% of the time this can be seen  Stopping dupixient discussed with patient however she thinks she was clearing on hands  Risk of repeat rash discussed with patient   Trying to pre treat with prednisone and hydroxyzine discussed with patient   She is schedule Thursday for injection will try to pre treat and she will call us thrusday and firday       Again, thank you for allowing me to participate in the care of your patient.        Sincerely,        Vijay Cox MD

## 2019-06-11 NOTE — PATIENT INSTRUCTIONS
-Begin Prednisone and hydroxyzine today, continue taking through Sunday  -Continue with Dupixent on Thursday as scheduled  -Call clinic on Monday with an update

## 2019-06-14 ENCOUNTER — TELEPHONE (OUTPATIENT)
Dept: DERMATOLOGY | Facility: CLINIC | Age: 59
End: 2019-06-14

## 2019-06-14 DIAGNOSIS — L20.81 ATOPIC NEURODERMATITIS: Primary | ICD-10-CM

## 2019-06-14 NOTE — TELEPHONE ENCOUNTER
I also recommend taking zyrtec twice daily.   Will let Dr. Cox decide what should be the next medication to treat patient's eczema.

## 2019-06-14 NOTE — TELEPHONE ENCOUNTER
Spoke to pt and she stated that she did pre-treat with hydroxyzine and prednisone. She reported red palms that are itchy and her sister stated she has some small hives on her abdomen and legs. Advised pt to continue to take the hydroxyzine. Advised pt  for increased rash, swelling, throat swelling, or increased signs of reaction to go to ED or call 911. Pt agreed with plan and will await to hear back from provider on next options as to stopping the Dupixent or switching top another med.   Lena CONROY RN BSN PHN  Specialty Clinics

## 2019-06-14 NOTE — TELEPHONE ENCOUNTER
Called pt and advised to take zyrtec twice daily. Pt agreed with plan.  Lena CONROY RN BSN PHN  Specialty Clinics

## 2019-06-14 NOTE — TELEPHONE ENCOUNTER
Pt calling to report that she took her 2nd dose of Dupixent and again broke out with sunburn like on her hands, head, face, neck and her sister reports that she does have hives and itchy.  She denies any numbness, tingling or swelling of her throat, tongue or lips.  She does report that her hands are tingling.    Pt advised if she feels like her airway is compromised in any way to call 911.  She was advised to discontinue this medication and will have Dr. Cox contact her with next plan of treatment.    Pt agrees with this plan.    Barb Barber  [facility] RN    Infant (Birth)

## 2019-06-14 NOTE — TELEPHONE ENCOUNTER
Reason for call:  Patient reporting a symptom    Symptom or request: on a new medication - dupixent.  Did her 2nd injection today.  1st injection had like a sunburn in her hands.  After injection again today - it is happening again - thinks it is an allergic reaction    Duration (how long have symptoms been present):     Have you been treated for this before? No    Additional comments:     Phone Number patient can be reached at:  Home number on file [phone] (home)    Best Time:  any    Can we leave a detailed message on this number:  Not Applicable    Call taken on 6/14/2019 at 3:15 PM by Tomasa Nicole

## 2019-06-19 RX ORDER — MYCOPHENOLATE MOFETIL 250 MG/1
500 CAPSULE ORAL 2 TIMES DAILY
Qty: 60 CAPSULE | Refills: 3 | Status: SHIPPED | OUTPATIENT
Start: 2019-06-19 | End: 2019-08-06

## 2019-06-19 NOTE — TELEPHONE ENCOUNTER
Have her go to "[employer] website there is a form to complete    Cell cept  Return to clinic 8 weeks

## 2019-06-19 NOTE — TELEPHONE ENCOUNTER
Left message for pt to call clinic regarding message from provider.  Lena CONROY RN BSN PHN  Specialty Clinics

## 2019-06-19 NOTE — TELEPHONE ENCOUNTER
Pt calling to check status of what is the next game plan for eczema. She is allergic to the DUPIXENT    Please call    Gwen [facility] CSS

## 2019-06-19 NOTE — TELEPHONE ENCOUNTER
Spoke to patient and she would like to try the cell cept and to send to the [facility] pharmacy in [place].  Pt stated she would be ok with doing the light therapy except transportation is difficult for her. She would like to see if she is eligible for a home light therapy unit. Is this something we can request for her? Thanks  Lena CONROY RN BSN PHN  Specialty Clinics

## 2019-06-19 NOTE — TELEPHONE ENCOUNTER
I torey consider restarting light therapy and possibly adding cell cept an oral medication that suprreses your lymphocyctes like mpoethtrexate.      Are you willing to try this?

## 2019-06-19 NOTE — TELEPHONE ENCOUNTER
Left message that script was sent to pharmacy and to go to the [employer] website and fill out the form. Advised to call if she had questions.  Lena CONROY RN BSN PHN  Specialty Clinics

## 2019-06-24 ENCOUNTER — TELEPHONE (OUTPATIENT)
Dept: DERMATOLOGY | Facility: CLINIC | Age: 59
End: 2019-06-24

## 2019-06-24 NOTE — TELEPHONE ENCOUNTER
Patient faxed us her portion of the [employer] paperwork as Provider also needs to fill out and sign/Provider section.     Dr. Cox filled out and signed Provider section. [facility] and left patient a message that this was done. Call if questions.    Daksha Em RN

## 2019-06-24 NOTE — TELEPHONE ENCOUNTER
Reason for Call:  Other medication    Detailed comments: Pt does not know when to start medication, please call, does not know name of medication either    Phone Number Patient can be reached at: Home number on file [phone] (home)or [phone]    Best Time: today    Can we leave a detailed message on this number? YES    Call taken on 6/24/2019 at 12:07 PM by Nilsa Grant

## 2019-06-24 NOTE — TELEPHONE ENCOUNTER
Per 6-11-19 Dermatology dictation:      Atopic derm   ? Allergic reaction to dupixient 1% of the time this can be seen  Stopping dupixient discussed with patient however she thinks she was clearing on hands  Risk of repeat rash discussed with patient   Trying to pre treat with prednisone and hydroxyzine discussed with patient   She is schedule Thursday for injection will try to pre treat and she will call us thrusday and firday.    Message left for patient to return call. I see Dr. Gallegos ordered Cellcept for patient on 6-19-19. Did patient try Dupixient again last Thursday or Thursday before that?... Or?..    Daksha Em RN

## 2019-06-25 NOTE — TELEPHONE ENCOUNTER
Spoke to patient and advised of Cellcept directions. She was confused as pharmacy dispensed Mycophenolate and did not know this was the same medication.     Daksha Em RN

## 2019-07-09 ENCOUNTER — NURSE TRIAGE (OUTPATIENT)
Dept: NURSING | Facility: CLINIC | Age: 59
End: 2019-07-09

## 2019-07-09 NOTE — TELEPHONE ENCOUNTER
"Patient states she was calling the \"dermatology clinic.\"  Has questions about a medication that she thinks was refilled and pharmacy does not have the prescription.  Per Epic chart no pending prescriptions noted.  Advised Caller to follow up with the Dermatology clinic 7/10/19.     Protocol-  Information Only Call- Adult  Care advice reviewed.   Disposition-  Call Provider's office when open.  Caller states understanding of the recommended disposition.  Advised to call back if further questions or concerns.     Annette Maharaj, DANGELON RN  [facility]     Reason for Disposition    Caller requesting a NON-URGENT new prescription or refill and triager unable to refill per unit policy    Protocols used: MEDICATION QUESTION CALL-A-AH    "

## 2019-07-17 ENCOUNTER — TELEPHONE (OUTPATIENT)
Dept: DERMATOLOGY | Facility: CLINIC | Age: 59
End: 2019-07-17

## 2019-07-17 NOTE — TELEPHONE ENCOUNTER
Spoke to pt and she would like to have FMLA papers for her skin.   She is wanting to stay on her FMLA for coming to the clinic for appts and if having a flare. Informed pt that we can fill out the forms if she brings them in. Pt stated she would drop them off on Friday.  Lena CONROY RN BSN PHN  Specialty Clinics

## 2019-07-17 NOTE — TELEPHONE ENCOUNTER
Reason for Call:  Other forms    Detailed comments: Pt wants to know if you will fill out FMLA paperwork for her? Please call to discuss    Phone Number Patient can be reached at: Home number on file [phone] (home), Work number on file:  [phone] (work) or Cell number on file:    Telephone Information:   Mobile [phone]       Best Time: today    Can we leave a detailed message on this number? YES    Call taken on 7/17/2019 at 12:18 PM by Nilsa Grant

## 2019-07-19 ENCOUNTER — TELEPHONE (OUTPATIENT)
Dept: DERMATOLOGY | Facility: CLINIC | Age: 59
End: 2019-07-19

## 2019-07-19 DIAGNOSIS — L20.81 ATOPIC NEURODERMATITIS: Primary | ICD-10-CM

## 2019-07-19 NOTE — TELEPHONE ENCOUNTER
Pt brought [place] paperwork to clinic to be filled out. Papers filled out and placed on providers desk for signature. Pt is also wondering if Lidex would be an option for her. Please advise.  Lena CONROY RN BSN PHN  Specialty Clinics

## 2019-07-22 NOTE — TELEPHONE ENCOUNTER
Spoke to patient who wants to use:     [facility] [place].    I also let her know that [facility] paperwork was faxed to Employer. She requested we mail her a copy as well and sent. Daksha Em RN

## 2019-07-22 NOTE — TELEPHONE ENCOUNTER
Message left to return call.     Need to verify which Pharmacy patient wants to use. I also need to let her know I faxed back [place] paperwork to her employer as requested.     Daksha Em RN

## 2019-07-23 RX ORDER — FLUOCINONIDE 0.5 MG/G
CREAM TOPICAL 2 TIMES DAILY
Qty: 60 G | Refills: 3 | Status: SHIPPED | OUTPATIENT
Start: 2019-07-23 | End: 2019-10-08

## 2019-07-26 NOTE — TELEPHONE ENCOUNTER
Pt states her employer is not satisfied with the [facility] paperwork - states they need more clarification on some of the answers, pt says the forms need to be turned in by 08/01. Pt will fax over the paperwork she received from her employer stating what needs to be clarified.     Please call pt to discuss @: [phone] (ok to leave message)    Or work #:  [phone] (8am-4:30 M-F), ok to leave message    Loren Behrenwalker  Specialty CSS

## 2019-07-29 NOTE — TELEPHONE ENCOUNTER
Forms were placed on Provider's desk on Friday 7-26-19. Provider is out of the office Fridays and is also out of the office today, so message left for patient that forms are on Provider desk and Provider returns to clinic tomorrow. Daksha Em RN

## 2019-07-29 NOTE — TELEPHONE ENCOUNTER
Pt calling to check on status of this request. Wants to be called when forms have been completed and faxed.     Please call pt @:  [phone]     Or [phone] (8-4:30)  Ok to leave message at either #.     Denise Behrendt  [facility] CSS

## 2019-07-30 NOTE — TELEPHONE ENCOUNTER
I answered 2 of the 4 questions    I dpo not know what her job is, therefor I cannot answer questions 3 and 6, p[lease discussed with patient and complete     Thanks

## 2019-07-30 NOTE — TELEPHONE ENCOUNTER
Tried to reach Patient to let her know we need to know what her job description entails to finish her forms, but mailbox is full and cannot accept any new messages at this time...    Daksha Em RN

## 2019-07-31 NOTE — TELEPHONE ENCOUNTER
Spoke to patient who is inter department  and answered questions and forms faxed back to her Employer's Human resources department. Daksha Em RN

## 2019-08-05 DIAGNOSIS — L20.81 ATOPIC NEURODERMATITIS: ICD-10-CM

## 2019-08-05 NOTE — TELEPHONE ENCOUNTER
Reason for Call:  Medication or medication refill:    Do you use a [facility]?  Name of the pharmacy and phone number for the current request:  [facility]    Name of the medication requested: Mycophenolate    Other request:   LAST REFILL: ??  LOV: 06/11/2019    Can we leave a detailed message on this number? Not Applicable    Phone number patient can be reached at: Home number on file [phone] (home)    Best Time: NA    Call taken on 8/5/2019 at 2:27 PM by Denise Behrendt

## 2019-08-06 RX ORDER — MYCOPHENOLATE MOFETIL 250 MG/1
500 CAPSULE ORAL 2 TIMES DAILY
Qty: 180 CAPSULE | Refills: 1 | Status: SHIPPED | OUTPATIENT
Start: 2019-08-06 | End: 2019-11-26

## 2019-08-06 NOTE — TELEPHONE ENCOUNTER
Request is for 3 month supply from mail order pharmacy. Last seen 6-11-19 and has no future Derm appts scheduled.     Not on RN protocol.     Daksha Em RN

## 2019-09-23 ENCOUNTER — TELEPHONE (OUTPATIENT)
Dept: DERMATOLOGY | Facility: CLINIC | Age: 59
End: 2019-09-23

## 2019-09-23 NOTE — TELEPHONE ENCOUNTER
Yes she needs labs and regular appointments. She should schedule a f/u appt for soonest available.

## 2019-09-23 NOTE — TELEPHONE ENCOUNTER
Reason for Call:  Other orders    Detailed comments: Pt needs lab orders placed in Epic, please call to confirm when order placed so she can schedule    Phone Number Patient can be reached at: Home number on file [phone] (home)    Best Time: today    Can we leave a detailed message on this number? YES    Call taken on 9/23/2019 at 3:11 PM by Nilsa Grant

## 2019-09-23 NOTE — TELEPHONE ENCOUNTER
Does patient need labs drawn while on Cell cept?... (Last had Basic labs drawn 4-1-19)    Last seen Mid June 2019, has no future Derm appt scheduled at this time.     Please advise if labs are needed and when to be seen again?  Daksha Em RN

## 2019-09-24 NOTE — TELEPHONE ENCOUNTER
Tried to reach patient, but mailbox is full and cannot accept any new messages...    Daksha Em RN

## 2019-09-24 NOTE — TELEPHONE ENCOUNTER
Patient returning call.  Patient asking if she can be seen on Tuesday 10/1/19?  Tomorrow does not work for her.    Please call her to discuss.  Thank you.    Mi Serra   [facility] Clinic  RN

## 2019-09-24 NOTE — TELEPHONE ENCOUNTER
Mailbox remains full. Can see any Dermatology Provider for follow up and ok to use a wait list hold spot if available. Daksha Em RN

## 2019-09-24 NOTE — TELEPHONE ENCOUNTER
Message left to return call. Per Provider, needs to be seen and Provider can order labs when seen if needed. Daksha Em RN

## 2019-09-25 NOTE — TELEPHONE ENCOUNTER
Called pt and unable to leave a message as mailbox was full.  Lena CONROY RN BSN PHN  Specialty Clinics

## 2019-10-07 DIAGNOSIS — G40.909 SEIZURE DISORDER (H): Primary | ICD-10-CM

## 2019-10-07 NOTE — TELEPHONE ENCOUNTER
Unable to refill as this is a reported by patient medication. Pt was last seen by Dr Lomeli 1/14/2019:      Patient Instructions:  Basic labs today for medication monitoring.   Continue the current dose of Keppra (Levetiracetam) for now: 1000mg twice daily.     Cued up Keppra for provider if appropriate.     Please advise. Thank you.    Kalina CANALES   Allergy RN

## 2019-10-07 NOTE — TELEPHONE ENCOUNTER
Reason for Call:  Other prescription    Detailed comments: Pt calling to get a refill on the anti seizure medication sent to [facility] in [place].    Phone Number Patient can be reached at: Cell number on file:    Telephone Information:   Mobile [phone]       Best Time: today    Can we leave a detailed message on this number? YES    Call taken on 10/7/2019 at 12:10 PM by Rosaura Meeks

## 2019-10-08 ENCOUNTER — OFFICE VISIT (OUTPATIENT)
Dept: DERMATOLOGY | Facility: CLINIC | Age: 59
End: 2019-10-08
Payer: COMMERCIAL

## 2019-10-08 VITALS — HEART RATE: 77 BPM | DIASTOLIC BLOOD PRESSURE: 84 MMHG | SYSTOLIC BLOOD PRESSURE: 103 MMHG | OXYGEN SATURATION: 98 %

## 2019-10-08 DIAGNOSIS — L20.81 ATOPIC NEURODERMATITIS: ICD-10-CM

## 2019-10-08 DIAGNOSIS — Z51.81 THERAPEUTIC DRUG MONITORING: Primary | ICD-10-CM

## 2019-10-08 LAB
ALBUMIN SERPL-MCNC: 4.1 G/DL (ref 3.4–5)
ALP SERPL-CCNC: 83 U/L (ref 40–150)
ALT SERPL W P-5'-P-CCNC: 22 U/L (ref 0–50)
ANION GAP SERPL CALCULATED.3IONS-SCNC: 5 MMOL/L (ref 3–14)
AST SERPL W P-5'-P-CCNC: 17 U/L (ref 0–45)
BILIRUB SERPL-MCNC: 0.2 MG/DL (ref 0.2–1.3)
BUN SERPL-MCNC: 13 MG/DL (ref 7–30)
CALCIUM SERPL-MCNC: 9.1 MG/DL (ref 8.5–10.1)
CHLORIDE SERPL-SCNC: 105 MMOL/L (ref 94–109)
CO2 SERPL-SCNC: 28 MMOL/L (ref 20–32)
CREAT SERPL-MCNC: 0.68 MG/DL (ref 0.52–1.04)
ERYTHROCYTE [DISTWIDTH] IN BLOOD BY AUTOMATED COUNT: 13 % (ref 10–15)
GFR SERPL CREATININE-BSD FRML MDRD: >90 ML/MIN/{1.73_M2}
GLUCOSE SERPL-MCNC: 73 MG/DL (ref 70–99)
HCT VFR BLD AUTO: 45.2 % (ref 35–47)
HGB BLD-MCNC: 15 G/DL (ref 11.7–15.7)
MCH RBC QN AUTO: 30.1 PG (ref 26.5–33)
MCHC RBC AUTO-ENTMCNC: 33.2 G/DL (ref 31.5–36.5)
MCV RBC AUTO: 91 FL (ref 78–100)
PLATELET # BLD AUTO: 287 10E9/L (ref 150–450)
POTASSIUM SERPL-SCNC: 3.9 MMOL/L (ref 3.4–5.3)
PROT SERPL-MCNC: 7.5 G/DL (ref 6.8–8.8)
RBC # BLD AUTO: 4.98 10E12/L (ref 3.8–5.2)
SODIUM SERPL-SCNC: 138 MMOL/L (ref 133–144)
WBC # BLD AUTO: 6.8 10E9/L (ref 4–11)

## 2019-10-08 PROCEDURE — 85027 COMPLETE CBC AUTOMATED: CPT | Performed by: PHYSICIAN ASSISTANT

## 2019-10-08 PROCEDURE — 36415 COLL VENOUS BLD VENIPUNCTURE: CPT | Performed by: PHYSICIAN ASSISTANT

## 2019-10-08 PROCEDURE — 99202 OFFICE O/P NEW SF 15 MIN: CPT | Performed by: PHYSICIAN ASSISTANT

## 2019-10-08 PROCEDURE — 80053 COMPREHEN METABOLIC PANEL: CPT | Performed by: PHYSICIAN ASSISTANT

## 2019-10-08 RX ORDER — MYCOPHENOLATE MOFETIL 500 MG/1
1000 TABLET, FILM COATED ORAL 2 TIMES DAILY
Qty: 180 TABLET | Refills: 0 | Status: SHIPPED | OUTPATIENT
Start: 2019-10-08 | End: 2019-11-21

## 2019-10-08 RX ORDER — FLUOCINONIDE 0.5 MG/G
CREAM TOPICAL 2 TIMES DAILY
Qty: 60 G | Refills: 3 | Status: SHIPPED | OUTPATIENT
Start: 2019-10-08 | End: 2020-02-11

## 2019-10-08 NOTE — PROGRESS NOTES
Ayala Wilson is a 59 year old year old female patient here today for recheck atopic dermatitis. Patient's biopsy of wrist favored eczema over psoriasis. She has currently been on cellcept for the past 4 months, initially saw improvement now flaring a little. She does note some nausea with medication. She had reaction to dupixent and no improvement with methotrexate.  Patient has no other skin complaints today.  Remainder of the HPI, Meds, PMH, Allergies, FH, and SH was reviewed in chart.    Pertinent Hx:  Atopic Dermatitis   Past Medical History:   Diagnosis Date     Contact dermatitis and other eczema, due to unspecified cause      Tobacco use disorder        Past Surgical History:   Procedure Laterality Date     SURGICAL HISTORY OF -   1974    appendectomy        Family History   Problem Relation Age of Onset     Cerebrovascular Disease Mother         CVA     Cancer Father         bone     Gastrointestinal Disease Daughter         crohn's       Social History     Socioeconomic History     Marital status:      Spouse name: Not on file     Number of children: Not on file     Years of education: Not on file     Highest education level: Not on file   Occupational History     Occupation:      Employer: [employer]     Comment: 18+ years (in 2018)   Social Needs     Financial resource strain: Not on file     Food insecurity:     Worry: Not on file     Inability: Not on file     Transportation needs:     Medical: Not on file     Non-medical: Not on file   Tobacco Use     Smoking status: Current Some Day Smoker     Packs/day: 1.00     Years: 30.00     Pack years: 30.00     Types: Cigarettes     Smokeless tobacco: Never Used   Substance and Sexual Activity     Alcohol use: No     Drug use: No     Sexual activity: Yes     Partners: Male   Lifestyle     Physical activity:     Days per week: Not on file     Minutes per session: Not on file     Stress: Not on file   Relationships     Social  connections:     Talks on phone: Not on file     Gets together: Not on file     Attends Evangelical service: Not on file     Active member of club or organization: Not on file     Attends meetings of clubs or organizations: Not on file     Relationship status:      Intimate partner violence:     Fear of current or ex partner: Not on file     Emotionally abused: Not on file     Physically abused: Not on file     Forced sexual activity: Not on file   Other Topics Concern     Parent/sibling w/ CABG, MI or angioplasty before 65F 55M? No   Social History Narrative    01/14/19    Patient lives alone.       Outpatient Encounter Medications as of 10/8/2019   Medication Sig Dispense Refill     CELLCEPT (BRAND) 250 MG capsule Take 2 capsules (500 mg) by mouth 2 times daily 180 capsule 1     fluocinonide (LIDEX) 0.05 % external cream Apply topically 2 times daily To affected areas 60 g 3     LEVETIRACETAM PO Take 1,000 mg by mouth 2 times daily       hydrOXYzine (ATARAX) 25 MG tablet Take 1 tablet (25 mg) by mouth two times daily (Patient not taking: Reported on 10/8/2019) 60 tablet 3     predniSONE (DELTASONE) 20 MG tablet 60mg day 1, 40mg day 2-4, 20mf day 5-7, (Patient not taking: Reported on 10/8/2019) 30 tablet 1     [DISCONTINUED] augmented betamethasone dipropionate (DIPROLENE-AF) 0.05 % external cream Apply sparingly to affected area twice daily as needed.  Do not apply to face. On hands 100 g 3     [DISCONTINUED] calcipotriene-betamethasone dipropionate (ENSTILAR) 0.005-0.064 % external foam Apply topically daily       [DISCONTINUED] fluocinolone (SYNALAR) 0.025 % cream Apply topically 2 times daily To face and neck 60 g 3     [DISCONTINUED] fluocinonide (LIDEX) 0.05 % external cream Apply topically 2 times daily To affected areas 60 g 3     [DISCONTINUED] folic acid (FOLVITE) 1 MG tablet Take 1 tablet (1 mg) by mouth daily Except day of methotrecate 90 tablet 3     [DISCONTINUED] methotrexate sodium 2.5 MG TABS  7 tablets once per week (Patient not taking: Reported on 6/11/2019) 85 tablet 1     No facility-administered encounter medications on file as of 10/8/2019.              Review Of Systems  Skin: As above  Eyes: negative  Ears/Nose/Throat: negative  Respiratory: No shortness of breath, dyspnea on exertion, cough, or hemoptysis  Cardiovascular: negative  Gastrointestinal: negative  Genitourinary: negative  Musculoskeletal: negative  Neurologic: negative  Psychiatric: negative  Hematologic/Lymphatic/Immunologic: negative  Endocrine: negative      O:   NAD, WDWN, Alert & Oriented, Mood & Affect wnl, Vitals stable   Here today alone   /84   Pulse 77   LMP 04/03/2007   SpO2 98%    General appearance normal   Vitals stable   Alert, oriented and in no acute distress     Eczematous plaques on hands, few pustules       Eyes: Conjunctivae/lids:Normal     ENT: Lips: normal    MSK:Normal    Pulm: Breathing Normal    Neuro/Psych: Orientation:Normal; Mood/Affect:Normal    A/P:  1. Atopic Dermatitis, dyshidrotic eczema  Increase cellcept 1000 mg twice daily.   Check cbc and cmp today.   Continue lidex as needed.   Skin care regimen reviewed with patient: Eliminate harsh soaps, i.e. Dial, zest, irsih spring; Mild soaps such as Cetaphil or Dove sensitive skin, avoid hot or cold showers, aggressive use of emollients including vanicream, cetaphil or cerave discussed with patient.   Return to clinic 2 months.

## 2019-10-08 NOTE — LETTER
10/8/2019         RE: Ayala Wilson  [id number] [address] [address]        Dear Colleague,    Thank you for referring your patient, Ayala Wilson, to the [facility]. Please see a copy of my visit note below.    Ayala Wilson is a 59 year old year old female patient here today for recheck atopic dermatitis. Patient's biopsy of wrist favored eczema over psoriasis. She has currently been on cellcept for the past 4 months, initially saw improvement now flaring a little. She does note some nausea with medication. She had reaction to dupixent and no improvement with methotrexate.  Patient has no other skin complaints today.  Remainder of the HPI, Meds, PMH, Allergies, FH, and SH was reviewed in chart.    Pertinent Hx:  Atopic Dermatitis   Past Medical History:   Diagnosis Date     Contact dermatitis and other eczema, due to unspecified cause      Tobacco use disorder        Past Surgical History:   Procedure Laterality Date     SURGICAL HISTORY OF -   1974    appendectomy        Family History   Problem Relation Age of Onset     Cerebrovascular Disease Mother         CVA     Cancer Father         bone     Gastrointestinal Disease Daughter         crohn's       Social History     Socioeconomic History     Marital status:      Spouse name: Not on file     Number of children: Not on file     Years of education: Not on file     Highest education level: Not on file   Occupational History     Occupation:      Employer: [employer]     Comment: 18+ years (in 2018)   Social Needs     Financial resource strain: Not on file     Food insecurity:     Worry: Not on file     Inability: Not on file     Transportation needs:     Medical: Not on file     Non-medical: Not on file   Tobacco Use     Smoking status: Current Some Day Smoker     Packs/day: 1.00     Years: 30.00     Pack years: 30.00     Types: Cigarettes     Smokeless tobacco: Never Used   Substance and  Sexual Activity     Alcohol use: No     Drug use: No     Sexual activity: Yes     Partners: Male   Lifestyle     Physical activity:     Days per week: Not on file     Minutes per session: Not on file     Stress: Not on file   Relationships     Social connections:     Talks on phone: Not on file     Gets together: Not on file     Attends Sikhism service: Not on file     Active member of club or organization: Not on file     Attends meetings of clubs or organizations: Not on file     Relationship status:      Intimate partner violence:     Fear of current or ex partner: Not on file     Emotionally abused: Not on file     Physically abused: Not on file     Forced sexual activity: Not on file   Other Topics Concern     Parent/sibling w/ CABG, MI or angioplasty before 65F 55M? No   Social History Narrative    01/14/19    Patient lives alone.       Outpatient Encounter Medications as of 10/8/2019   Medication Sig Dispense Refill     CELLCEPT (BRAND) 250 MG capsule Take 2 capsules (500 mg) by mouth 2 times daily 180 capsule 1     fluocinonide (LIDEX) 0.05 % external cream Apply topically 2 times daily To affected areas 60 g 3     LEVETIRACETAM PO Take 1,000 mg by mouth 2 times daily       hydrOXYzine (ATARAX) 25 MG tablet Take 1 tablet (25 mg) by mouth two times daily (Patient not taking: Reported on 10/8/2019) 60 tablet 3     predniSONE (DELTASONE) 20 MG tablet 60mg day 1, 40mg day 2-4, 20mf day 5-7, (Patient not taking: Reported on 10/8/2019) 30 tablet 1     [DISCONTINUED] augmented betamethasone dipropionate (DIPROLENE-AF) 0.05 % external cream Apply sparingly to affected area twice daily as needed.  Do not apply to face. On hands 100 g 3     [DISCONTINUED] calcipotriene-betamethasone dipropionate (ENSTILAR) 0.005-0.064 % external foam Apply topically daily       [DISCONTINUED] fluocinolone (SYNALAR) 0.025 % cream Apply topically 2 times daily To face and neck 60 g 3     [DISCONTINUED] fluocinonide (LIDEX)  0.05 % external cream Apply topically 2 times daily To affected areas 60 g 3     [DISCONTINUED] folic acid (FOLVITE) 1 MG tablet Take 1 tablet (1 mg) by mouth daily Except day of methotrecate 90 tablet 3     [DISCONTINUED] methotrexate sodium 2.5 MG TABS 7 tablets once per week (Patient not taking: Reported on 6/11/2019) 85 tablet 1     No facility-administered encounter medications on file as of 10/8/2019.              Review Of Systems  Skin: As above  Eyes: negative  Ears/Nose/Throat: negative  Respiratory: No shortness of breath, dyspnea on exertion, cough, or hemoptysis  Cardiovascular: negative  Gastrointestinal: negative  Genitourinary: negative  Musculoskeletal: negative  Neurologic: negative  Psychiatric: negative  Hematologic/Lymphatic/Immunologic: negative  Endocrine: negative      O:   NAD, WDWN, Alert & Oriented, Mood & Affect wnl, Vitals stable   Here today alone   /84   Pulse 77   LMP 04/03/2007   SpO2 98%    General appearance normal   Vitals stable   Alert, oriented and in no acute distress     Eczematous plaques on hands, few pustules       Eyes: Conjunctivae/lids:Normal     ENT: Lips: normal    MSK:Normal    Pulm: Breathing Normal    Neuro/Psych: Orientation:Normal; Mood/Affect:Normal    A/P:  1. Atopic Dermatitis, dyshidrotic eczema  Increase cellcept 1000 mg twice daily.   Check cbc and cmp today.   Continue lidex as needed.   Skin care regimen reviewed with patient: Eliminate harsh soaps, i.e. Dial, zest, irsih spring; Mild soaps such as Cetaphil or Dove sensitive skin, avoid hot or cold showers, aggressive use of emollients including vanicream, cetaphil or cerave discussed with patient.   Return to clinic 2 months.       Again, thank you for allowing me to participate in the care of your patient.        Sincerely,        Sara Pennington PA-C

## 2019-10-08 NOTE — NURSING NOTE
"Initial /84   Pulse 77   LMP 04/03/2007   SpO2 98%  Estimated body mass index is 20.25 kg/m  as calculated from the following:    Height as of 1/14/19: 1.727 m (5' 8\").    Weight as of 1/14/19: 60.4 kg (133 lb 3.2 oz). .    Genesis Biggs LPN    "

## 2019-10-11 RX ORDER — LEVETIRACETAM 1000 MG/1
1000 TABLET ORAL 2 TIMES DAILY
Qty: 60 TABLET | Refills: 1 | Status: SHIPPED | OUTPATIENT
Start: 2019-10-11 | End: 2019-11-11

## 2019-11-04 DIAGNOSIS — G40.909 SEIZURE DISORDER (H): ICD-10-CM

## 2019-11-04 NOTE — TELEPHONE ENCOUNTER
Requesting refill of   levETIRAcetam (KEPPRA) 1000 MG tablet 1,000 mg, 2 TIMES DAILY       levETIRAcetam (KEPPRA) 1000 MG tablet 1,000 mg, 2 TIMES DAILY

## 2019-11-04 NOTE — TELEPHONE ENCOUNTER
Left message for pt to make appt as last refill stated she needed appt. Can refill until appt when she makes appt.   Lena CONROY RN BSN PHN  Specialty Clinics

## 2019-11-11 RX ORDER — LEVETIRACETAM 1000 MG/1
1000 TABLET ORAL 2 TIMES DAILY
Qty: 180 TABLET | Refills: 0 | Status: SHIPPED | OUTPATIENT
Start: 2019-11-11 | End: 2020-02-03

## 2019-11-11 NOTE — TELEPHONE ENCOUNTER
Pt calling back - scheduled an appt for 12-10-19.  Pt requesting a 3 month supply if possible.    Would like a call back when addressed - at [phone] ok to leave a detailed VM.    -Tomasa Nicole  Clinic Station

## 2019-11-19 ENCOUNTER — NURSE TRIAGE (OUTPATIENT)
Dept: NURSING | Facility: CLINIC | Age: 59
End: 2019-11-19

## 2019-11-19 NOTE — TELEPHONE ENCOUNTER
"Patient states she received a call from \"Dr. Vasquez's office today.\"  Currently calling back.  Per Epic chart this RN notes an Upcoming Visit /Appointment 12/10/19 at 3:00pm with Dr. HERLINDA Lomeli at [place]. Gave this information to Caller and advised her to follow up with [facility] 11/20/19.    Protocol-  Information Only Call  Care advice reviewed.   Disposition-  Call Provider when office is open  Caller states understanding of the recommended disposition.   Advised to call back if further questions or concerns.     ROSE Garcia RN  [facility]     Reason for Disposition    [1] Caller requesting NON-URGENT health information AND [2] PCP's office is the best resource    Protocols used: INFORMATION ONLY CALL-A-AH    "

## 2019-11-21 ENCOUNTER — TELEPHONE (OUTPATIENT)
Dept: DERMATOLOGY | Facility: CLINIC | Age: 59
End: 2019-11-21

## 2019-11-21 ENCOUNTER — OFFICE VISIT (OUTPATIENT)
Dept: NEUROLOGY | Facility: CLINIC | Age: 59
End: 2019-11-21
Payer: COMMERCIAL

## 2019-11-21 VITALS
WEIGHT: 137.4 LBS | SYSTOLIC BLOOD PRESSURE: 128 MMHG | TEMPERATURE: 97.9 F | HEART RATE: 93 BPM | RESPIRATION RATE: 20 BRPM | DIASTOLIC BLOOD PRESSURE: 66 MMHG | BODY MASS INDEX: 20.89 KG/M2

## 2019-11-21 DIAGNOSIS — Z79.899 ENCOUNTER FOR LONG-TERM (CURRENT) USE OF MEDICATIONS: ICD-10-CM

## 2019-11-21 DIAGNOSIS — L20.81 ATOPIC NEURODERMATITIS: ICD-10-CM

## 2019-11-21 DIAGNOSIS — G40.109 LOCALIZATION-RELATED FOCAL EPILEPSY WITH SIMPLE PARTIAL SEIZURES (H): Primary | ICD-10-CM

## 2019-11-21 DIAGNOSIS — R51.9 NEW ONSET HEADACHE: ICD-10-CM

## 2019-11-21 LAB — ERYTHROCYTE [SEDIMENTATION RATE] IN BLOOD BY WESTERGREN METHOD: 3 MM/H (ref 0–30)

## 2019-11-21 PROCEDURE — 36415 COLL VENOUS BLD VENIPUNCTURE: CPT | Performed by: PSYCHIATRY & NEUROLOGY

## 2019-11-21 PROCEDURE — 99214 OFFICE O/P EST MOD 30 MIN: CPT | Performed by: PSYCHIATRY & NEUROLOGY

## 2019-11-21 PROCEDURE — 80177 DRUG SCRN QUAN LEVETIRACETAM: CPT | Mod: 90 | Performed by: PSYCHIATRY & NEUROLOGY

## 2019-11-21 PROCEDURE — 99000 SPECIMEN HANDLING OFFICE-LAB: CPT | Performed by: PSYCHIATRY & NEUROLOGY

## 2019-11-21 PROCEDURE — 85652 RBC SED RATE AUTOMATED: CPT | Performed by: PSYCHIATRY & NEUROLOGY

## 2019-11-21 RX ORDER — MYCOPHENOLATE MOFETIL 500 MG/1
1000 TABLET, FILM COATED ORAL 2 TIMES DAILY
Qty: 180 TABLET | Refills: 0 | Status: SHIPPED | OUTPATIENT
Start: 2019-11-21 | End: 2019-11-26

## 2019-11-21 NOTE — PATIENT INSTRUCTIONS
Plan:    Labs today: Keppra level, ESR (for inflammation)  MRI Brain and CTA Head and Neck.   We will notify you of the test results and let you know if any additional evaluations are needed.   Talk to Dr. Mayberry about the anxiety.   Return to Neurology in 4 months.

## 2019-11-21 NOTE — TELEPHONE ENCOUNTER
Reason for Call:  Medication or medication refill:    Do you use a [facility]?  Name of the pharmacy and phone number for the current request:  [facility] [place] [phone]    Name of the medication requested: CELLCEPT    Other request: please call pt to let her know if this get send to pharmacy   LOV:  10/8/19  LAST REFILL:  ?      Can we leave a detailed message on this number? YES    Phone number patient can be reached at: Home number on file [phone] (home)    Best Time: any     Call taken on 11/21/2019 at 1:46 PM by Gwen Brar

## 2019-11-21 NOTE — LETTER
"    11/21/2019         RE: Ayala Wilson  [address] [facility] [address]        Dear Colleague,    Thank you for referring your patient, Ayala Wilson, to the [facility]. Please see a copy of my visit note below.    Initial /66 (BP Location: Left arm, Patient Position: Sitting, Cuff Size: Adult Large)   Pulse 93   Temp 97.9  F (36.6  C) (Oral)   Resp 20   Wt 62.3 kg (137 lb 6.4 oz)   LMP 04/03/2007   BMI 20.89 kg/m    Estimated body mass index is 20.89 kg/m  as calculated from the following:    Height as of 1/14/19: 1.727 m (5' 8\").    Weight as of this encounter: 62.3 kg (137 lb 6.4 oz). .    Kyle NICOLAS RN   Specialty Clinics     ESTABLISHED PATIENT NEUROLOGY NOTE    DATE OF VISIT: 11/21/2019  MRN: [id number]  PATIENT NAME: Ayala Wilson  YOB: 1960    Chief Complaint   Patient presents with     RECHECK     Seizures     SUBJECTIVE:                                                      HISTORY OF PRESENT ILLNESS:  Ayala is here for follow up regarding partial seizures. I met the patient in consultation for the same about 11 months ago. She had previously followed with Dr. Graves at [facility]. She has additional history of neuro-ophthalmology visits for papilledema. Her seizures were described as spells of dizziness with visual changes, no other spells types or LOC. Previous EEG had been abnormal, so she was put on Keppra. Improvement of spells noted upon follow-up. I did not have her previous neurology records for review at that time, but upon later review, I made this note: Per old notes, she was worked up for dizzy spells and had Left temporal sharps. MRI from 2013 reportedly age-related changes only. I referred her to physical therapy for balance. It does not appear that she went.     Sleep-deprived EEG in 2.2019 was normal.     Patient has a list with her: The patient tells me that her daughter tells her she is anxious. She " says she has had a pulsation in her temples at times. It's like she can hear her heartbeat. No jaw pain. She says she has shakiness in hands and legs at times. She says that she has a whitish discharge coming from her eyes.    She says she does not understand why she is on an anti-seizure medication when she never had a seizure. In terms of her dizzy spells, the most recent was 6-8 years ago, she says. No new weakness.     CURRENT MEDICATIONS:   CELLCEPT (BRAND) 250 MG capsule, Take 2 capsules (500 mg) by mouth 2 times daily  CELLCEPT (BRAND) 500 MG tablet, Take 2 tablets (1,000 mg) by mouth 2 times daily  fluocinonide (LIDEX) 0.05 % external cream, Apply topically 2 times daily To affected areas  levETIRAcetam (KEPPRA) 1000 MG tablet, Take 1 tablet (1,000 mg) by mouth 2 times daily PLEASE CALL PATIENT WHEN READY TO   LEVETIRACETAM PO, Take 1,000 mg by mouth 2 times daily  hydrOXYzine (ATARAX) 25 MG tablet, Take 1 tablet (25 mg) by mouth two times daily (Patient not taking: Reported on 10/8/2019)  predniSONE (DELTASONE) 20 MG tablet, 60mg day 1, 40mg day 2-4, 20mf day 5-7, (Patient not taking: Reported on 10/8/2019)    No current facility-administered medications on file prior to visit.       RECENT DIAGNOSTIC STUDIES:   Labs: No results found for any visits on 11/21/19.  Recent CBC and CMP were normal.    Electroencephalogram (2.7.19):  IMPRESSION: This outpatient sleep-deprived EEG study is normal during wakefulness and drowsiness without any interictal epileptiform discharges, electrographic or clinical seizures, and paroxysmal behavioral events.  Clinical correlation is recommended.  Severo Matthews MD.    REVIEW OF SYSTEMS:                                                      10-point review of systems is negative except as mentioned above in HPI.     EXAM:                                                      Physical Exam:   Vitals: /66 (BP Location: Left arm, Patient Position: Sitting,  Cuff Size: Adult Large)   Pulse 93   Temp 97.9  F (36.6  C) (Oral)   Resp 20   Wt 62.3 kg (137 lb 6.4 oz)   LMP 04/03/2007   BMI 20.89 kg/m     BMI= Body mass index is 20.89 kg/m .  GENERAL: NAD. Thin, frail woman.   CV: RRR. S1, S2.   Neurologic:  MENTAL STATUS: Alert, attentive. Speech is fluent. Normal comprehension.  Normal attention and concentration. Adequate fund of knowledge.   CRANIAL NERVES: Discs technically difficult to visualize. Visual fields intact to confrontation. Pupils equally, round and reactive to light. Facial sensation and movement normal. EOM full. Hearing intact to conversation. Trapezius strength intact. Palate moves symmetrically. Tongue midline.  MOTOR: 5/5 in proximal and distal muscle groups of upper and lower extremities for size. Tone and bulk is thin.   DTRs: Intact and symmetric. Babinski down-going bilaterally.   SENSATION: Normal light touch throughout.   COORDINATION: Finger tapping normal. Knee heel shin normal.  STATION AND GAIT: Romberg negative. Casual gait is cautious..  Right hand-dominant.     ASSESSMENT and PLAN:                                                      Assessment and Plan:    ICD-10-CM    1. Localization-related focal epilepsy with simple partial seizures (H) G40.109 MR Brain w/o & w Contrast     Keppra (Levetiracetam) Level     CT Head Neck Angio w/o & w Contrast   2. New onset headache R51 MR Brain w/o & w Contrast     Erythrocyte sedimentation rate auto     CT Head Neck Angio w/o & w Contrast   3. Encounter for long-term (current) use of medications Z79.899 Keppra (Levetiracetam) Level        Ms. Wilson is a pleasant 59 yo woman self-referred to establish care for partial seizure disorder. We reviewed the electroencephalogram results in clinic today. We discussed weaning Ayala off of the Keppra, given that the test was normal and she has not had any spells in many years. She will think about this. Regarding her new headache/pulsatile sensation in  the head, I would like to check an ESR, MRI and CTA. I would like to see Ayala back after these tests are completed. For the additional concerns she expressed in clinic today, with defer to her primary care provider. She understands and agrees.     Patient Instructions:  Labs today: Keppra level, ESR (for inflammation)  MRI Brain and CTA Head and Neck.   We will notify you of the test results and let you know if any additional evaluations are needed.   Talk to Dr. Mayberry about the anxiety.   Return to Neurology in 4 months.     Total Time: 25 minutes were spent with the patient. More than 50% of the time spent on counseling (as described above in Assessment and Plan/Instructions) /coordinating the care.    Kay Lomeli MD  Neurology    CC: Heath Mayberry MD                    Again, thank you for allowing me to participate in the care of your patient.        Sincerely,        Kay oLmeli MD

## 2019-11-21 NOTE — TELEPHONE ENCOUNTER
Medication not on nurse protocol. Sent to provider for review.  Ann Dubon RN BSN PHN        LOV 10/18/19  A/P:  1. Atopic Dermatitis, dyshidrotic eczema  Increase cellcept 1000 mg twice daily.   Check cbc and cmp today.   Continue lidex as needed.   Skin care regimen reviewed with patient: Eliminate harsh soaps, i.e. Dial, zest, irsih spring; Mild soaps such as Cetaphil or Dove sensitive skin, avoid hot or cold showers, aggressive use of emollients including vanicream, cetaphil or cerave discussed with patient.   Return to clinic 2 months.

## 2019-11-21 NOTE — PROGRESS NOTES
"Initial /66 (BP Location: Left arm, Patient Position: Sitting, Cuff Size: Adult Large)   Pulse 93   Temp 97.9  F (36.6  C) (Oral)   Resp 20   Wt 62.3 kg (137 lb 6.4 oz)   LMP 04/03/2007   BMI 20.89 kg/m   Estimated body mass index is 20.89 kg/m  as calculated from the following:    Height as of 1/14/19: 1.727 m (5' 8\").    Weight as of this encounter: 62.3 kg (137 lb 6.4 oz). .    Kyle NICOLAS RN   Specialty Clinics   "

## 2019-11-21 NOTE — PROGRESS NOTES
ESTABLISHED PATIENT NEUROLOGY NOTE    DATE OF VISIT: 11/21/2019  MRN: [id number]  PATIENT NAME: Ayala Wilson  YOB: 1960    Chief Complaint   Patient presents with     RECHECK     Seizures     SUBJECTIVE:                                                      HISTORY OF PRESENT ILLNESS:  Ayala is here for follow up regarding partial seizures. I met the patient in consultation for the same about 11 months ago. She had previously followed with Dr. Graves at [facility]. She has additional history of neuro-ophthalmology visits for papilledema. Her seizures were described as spells of dizziness with visual changes, no other spells types or LOC. Previous EEG had been abnormal, so she was put on Keppra. Improvement of spells noted upon follow-up. I did not have her previous neurology records for review at that time, but upon later review, I made this note: Per old notes, she was worked up for dizzy spells and had Left temporal sharps. MRI from 2013 reportedly age-related changes only. I referred her to physical therapy for balance. It does not appear that she went.     Sleep-deprived EEG in 2.2019 was normal.     Patient has a list with her: The patient tells me that her daughter tells her she is anxious. She says she has had a pulsation in her temples at times. It's like she can hear her heartbeat. No jaw pain. She says she has shakiness in hands and legs at times. She says that she has a whitish discharge coming from her eyes.    She says she does not understand why she is on an anti-seizure medication when she never had a seizure. In terms of her dizzy spells, the most recent was 6-8 years ago, she says. No new weakness.     CURRENT MEDICATIONS:   CELLCEPT (BRAND) 250 MG capsule, Take 2 capsules (500 mg) by mouth 2 times daily  CELLCEPT (BRAND) 500 MG tablet, Take 2 tablets (1,000 mg) by mouth 2 times daily  fluocinonide (LIDEX) 0.05 % external cream, Apply topically 2 times  daily To affected areas  levETIRAcetam (KEPPRA) 1000 MG tablet, Take 1 tablet (1,000 mg) by mouth 2 times daily PLEASE CALL PATIENT WHEN READY TO   LEVETIRACETAM PO, Take 1,000 mg by mouth 2 times daily  hydrOXYzine (ATARAX) 25 MG tablet, Take 1 tablet (25 mg) by mouth two times daily (Patient not taking: Reported on 10/8/2019)  predniSONE (DELTASONE) 20 MG tablet, 60mg day 1, 40mg day 2-4, 20mf day 5-7, (Patient not taking: Reported on 10/8/2019)    No current facility-administered medications on file prior to visit.       RECENT DIAGNOSTIC STUDIES:   Labs: No results found for any visits on 11/21/19.  Recent CBC and CMP were normal.    Electroencephalogram (2.7.19):  IMPRESSION: This outpatient sleep-deprived EEG study is normal during wakefulness and drowsiness without any interictal epileptiform discharges, electrographic or clinical seizures, and paroxysmal behavioral events.  Clinical correlation is recommended.  Severo Matthews MD.    REVIEW OF SYSTEMS:                                                      10-point review of systems is negative except as mentioned above in HPI.     EXAM:                                                      Physical Exam:   Vitals: /66 (BP Location: Left arm, Patient Position: Sitting, Cuff Size: Adult Large)   Pulse 93   Temp 97.9  F (36.6  C) (Oral)   Resp 20   Wt 62.3 kg (137 lb 6.4 oz)   LMP 04/03/2007   BMI 20.89 kg/m    BMI= Body mass index is 20.89 kg/m .  GENERAL: NAD. Thin, frail woman.   CV: RRR. S1, S2.   Neurologic:  MENTAL STATUS: Alert, attentive. Speech is fluent. Normal comprehension.  Normal attention and concentration. Adequate fund of knowledge.   CRANIAL NERVES: Discs technically difficult to visualize. Visual fields intact to confrontation. Pupils equally, round and reactive to light. Facial sensation and movement normal. EOM full. Hearing intact to conversation. Trapezius strength intact. Palate moves symmetrically. Tongue  midline.  MOTOR: 5/5 in proximal and distal muscle groups of upper and lower extremities for size. Tone and bulk is thin.   DTRs: Intact and symmetric. Babinski down-going bilaterally.   SENSATION: Normal light touch throughout.   COORDINATION: Finger tapping normal. Knee heel shin normal.  STATION AND GAIT: Romberg negative. Casual gait is cautious..  Right hand-dominant.     ASSESSMENT and PLAN:                                                      Assessment and Plan:    ICD-10-CM    1. Localization-related focal epilepsy with simple partial seizures (H) G40.109 MR Brain w/o & w Contrast     Keppra (Levetiracetam) Level     CT Head Neck Angio w/o & w Contrast   2. New onset headache R51 MR Brain w/o & w Contrast     Erythrocyte sedimentation rate auto     CT Head Neck Angio w/o & w Contrast   3. Encounter for long-term (current) use of medications Z79.899 Keppra (Levetiracetam) Level        Ms. Wilson is a pleasant 59 yo woman self-referred to establish care for partial seizure disorder. We reviewed the electroencephalogram results in clinic today. We discussed weaning Ayala off of the Keppra, given that the test was normal and she has not had any spells in many years. She will think about this. Regarding her new headache/pulsatile sensation in the head, I would like to check an ESR, MRI and CTA. I would like to see Ayala back after these tests are completed. For the additional concerns she expressed in clinic today, with defer to her primary care provider. She understands and agrees.     Patient Instructions:  Labs today: Keppra level, ESR (for inflammation)  MRI Brain and CTA Head and Neck.   We will notify you of the test results and let you know if any additional evaluations are needed.   Talk to Dr. Mayberry about the anxiety.   Return to Neurology in 4 months.     Total Time: 25 minutes were spent with the patient. More than 50% of the time spent on counseling (as described above in Assessment and  Plan/Instructions) /coordinating the care.    Kay Lomeli MD  Neurology    CC: Heath Mayberry MD

## 2019-11-21 NOTE — LETTER
[facility]  [address] [facility] [address]  [phone]          November 25, 2019    Ayala Wilson                                                                                                                     [address] [facility] [address]        Dear Dr. Yani Cai has reviewed the results of your labs of 11/21/19, and has made the following observations:      Lab results were normal.    Please don't hesitate to contact our office with any additional questions or concerns.             Sincerely,     MELA Jacinto  For Kay Lomeli MD

## 2019-11-23 LAB — LEVETIRACETAM SERPL-MCNC: 18 UG/ML (ref 12–46)

## 2019-11-24 NOTE — RESULT ENCOUNTER NOTE
Please advise Ayala Wilson,  1960, that her lab results were normal.  [phone] (home) [phone] (work)  Kay Lomeli MD

## 2019-11-26 RX ORDER — MYCOPHENOLATE MOFETIL 500 MG/1
1000 TABLET, FILM COATED ORAL 2 TIMES DAILY
Qty: 180 TABLET | Refills: 0 | Status: SHIPPED | OUTPATIENT
Start: 2019-11-26 | End: 2021-08-13

## 2019-11-26 NOTE — TELEPHONE ENCOUNTER
Pt calling stating she is needing her cellcept to be sent to [facility] specialty [facility]. Currently was sent to [facility].     Pt is almost out of medication.     Gwen Brar  Specialty CSS

## 2019-12-09 ENCOUNTER — OFFICE VISIT (OUTPATIENT)
Dept: DERMATOLOGY | Facility: CLINIC | Age: 59
End: 2019-12-09
Payer: COMMERCIAL

## 2019-12-09 VITALS — HEART RATE: 76 BPM | SYSTOLIC BLOOD PRESSURE: 135 MMHG | OXYGEN SATURATION: 97 % | DIASTOLIC BLOOD PRESSURE: 78 MMHG

## 2019-12-09 DIAGNOSIS — L20.81 ATOPIC NEURODERMATITIS: Primary | ICD-10-CM

## 2019-12-09 DIAGNOSIS — B07.8 COMMON WART: ICD-10-CM

## 2019-12-09 DIAGNOSIS — Z79.899 ENCOUNTER FOR LONG-TERM (CURRENT) USE OF HIGH-RISK MEDICATION: ICD-10-CM

## 2019-12-09 LAB
ALBUMIN SERPL-MCNC: 3.9 G/DL (ref 3.4–5)
ALP SERPL-CCNC: 81 U/L (ref 40–150)
ALT SERPL W P-5'-P-CCNC: 24 U/L (ref 0–50)
ANION GAP SERPL CALCULATED.3IONS-SCNC: 2 MMOL/L (ref 3–14)
AST SERPL W P-5'-P-CCNC: 18 U/L (ref 0–45)
BILIRUB SERPL-MCNC: 0.3 MG/DL (ref 0.2–1.3)
BUN SERPL-MCNC: 13 MG/DL (ref 7–30)
CALCIUM SERPL-MCNC: 9.3 MG/DL (ref 8.5–10.1)
CHLORIDE SERPL-SCNC: 105 MMOL/L (ref 94–109)
CO2 SERPL-SCNC: 30 MMOL/L (ref 20–32)
CREAT SERPL-MCNC: 0.76 MG/DL (ref 0.52–1.04)
ERYTHROCYTE [DISTWIDTH] IN BLOOD BY AUTOMATED COUNT: 13.4 % (ref 10–15)
GFR SERPL CREATININE-BSD FRML MDRD: 83 ML/MIN/{1.73_M2}
GLUCOSE SERPL-MCNC: 88 MG/DL (ref 70–99)
HCT VFR BLD AUTO: 42.5 % (ref 35–47)
HGB BLD-MCNC: 14 G/DL (ref 11.7–15.7)
MCH RBC QN AUTO: 29.9 PG (ref 26.5–33)
MCHC RBC AUTO-ENTMCNC: 32.9 G/DL (ref 31.5–36.5)
MCV RBC AUTO: 91 FL (ref 78–100)
PLATELET # BLD AUTO: 304 10E9/L (ref 150–450)
POTASSIUM SERPL-SCNC: 4 MMOL/L (ref 3.4–5.3)
PROT SERPL-MCNC: 7.4 G/DL (ref 6.8–8.8)
RBC # BLD AUTO: 4.68 10E12/L (ref 3.8–5.2)
SODIUM SERPL-SCNC: 137 MMOL/L (ref 133–144)
WBC # BLD AUTO: 7.6 10E9/L (ref 4–11)

## 2019-12-09 PROCEDURE — 36415 COLL VENOUS BLD VENIPUNCTURE: CPT | Performed by: DERMATOLOGY

## 2019-12-09 PROCEDURE — 85027 COMPLETE CBC AUTOMATED: CPT | Performed by: DERMATOLOGY

## 2019-12-09 PROCEDURE — 80053 COMPREHEN METABOLIC PANEL: CPT | Performed by: DERMATOLOGY

## 2019-12-09 PROCEDURE — 17110 DESTRUCTION B9 LES UP TO 14: CPT | Performed by: DERMATOLOGY

## 2019-12-09 PROCEDURE — 99214 OFFICE O/P EST MOD 30 MIN: CPT | Mod: 25 | Performed by: DERMATOLOGY

## 2019-12-09 RX ORDER — AZATHIOPRINE 50 MG/1
50 TABLET ORAL DAILY
Qty: 60 TABLET | Refills: 1 | Status: SHIPPED | OUTPATIENT
Start: 2019-12-09 | End: 2021-08-13

## 2019-12-09 NOTE — PROGRESS NOTES
Ayala Wilson is a 59 year old year old female patient here today for hx of psoriasis and atopic derm.  Previous bx showed psoriasis, she failed methotreate,  Repeat bx showed spong derm,  She failed dupixient and methotrexate and light in the past.  She still has spots on hands and is now on cell cept without help.  She notes tender spot on right foot.   .  Patient states this has been present for a while.  Patient reports the following symptoms:  tender.  Patient reports the following previous treatments crypo.  These treatments did not work.  Patient reports the following modifying factors none.  Associated symptoms: none.  Patient has no other skin complaints today.  Remainder of the HPI, Meds, PMH, Allergies, FH, and SH was reviewed in chart.      Past Medical History:   Diagnosis Date     Contact dermatitis and other eczema, due to unspecified cause      Tobacco use disorder        Past Surgical History:   Procedure Laterality Date     SURGICAL HISTORY OF -   1974    appendectomy        Family History   Problem Relation Age of Onset     Cerebrovascular Disease Mother         CVA     Cancer Father         bone     Gastrointestinal Disease Daughter         crohn's       Social History     Socioeconomic History     Marital status:      Spouse name: Not on file     Number of children: Not on file     Years of education: Not on file     Highest education level: Not on file   Occupational History     Occupation:      Employer: [employer]     Comment: 18+ years (in 2018)   Social Needs     Financial resource strain: Not on file     Food insecurity:     Worry: Not on file     Inability: Not on file     Transportation needs:     Medical: Not on file     Non-medical: Not on file   Tobacco Use     Smoking status: Current Some Day Smoker     Packs/day: 1.00     Years: 30.00     Pack years: 30.00     Types: Cigarettes     Smokeless tobacco: Never Used   Substance and Sexual Activity      Alcohol use: No     Drug use: No     Sexual activity: Yes     Partners: Male   Lifestyle     Physical activity:     Days per week: Not on file     Minutes per session: Not on file     Stress: Not on file   Relationships     Social connections:     Talks on phone: Not on file     Gets together: Not on file     Attends Sabianist service: Not on file     Active member of club or organization: Not on file     Attends meetings of clubs or organizations: Not on file     Relationship status:      Intimate partner violence:     Fear of current or ex partner: Not on file     Emotionally abused: Not on file     Physically abused: Not on file     Forced sexual activity: Not on file   Other Topics Concern     Parent/sibling w/ CABG, MI or angioplasty before 65F 55M? No   Social History Narrative    01/14/19    Patient lives alone.       Outpatient Encounter Medications as of 12/9/2019   Medication Sig Dispense Refill     azaTHIOprine (IMURAN) 50 MG tablet Take 1 tablet (50 mg) by mouth daily 60 tablet 1     CELLCEPT (BRAND) 500 MG tablet Take 2 tablets (1,000 mg) by mouth 2 times daily 180 tablet 0     fluocinonide (LIDEX) 0.05 % external cream Apply topically 2 times daily To affected areas 60 g 3     levETIRAcetam (KEPPRA) 1000 MG tablet Take 1 tablet (1,000 mg) by mouth 2 times daily PLEASE CALL PATIENT WHEN READY TO  180 tablet 0     hydrOXYzine (ATARAX) 25 MG tablet Take 1 tablet (25 mg) by mouth two times daily (Patient not taking: Reported on 10/8/2019) 60 tablet 3     LEVETIRACETAM PO Take 1,000 mg by mouth 2 times daily       No facility-administered encounter medications on file as of 12/9/2019.              Review Of Systems  Skin: As above  Eyes: negative  Ears/Nose/Throat: negative  Respiratory: No shortness of breath, dyspnea on exertion, cough, or hemoptysis  Cardiovascular: negative  Gastrointestinal: negative  Genitourinary: negative  Musculoskeletal: negative  Neurologic: negative  Psychiatric:  negative  Hematologic/Lymphatic/Immunologic: negative  Endocrine: negative      O:   NAD, WDWN, Alert & Oriented, Mood & Affect wnl, Vitals stable   Here today alone   /78   Pulse 76   LMP 04/03/2007   SpO2 97%    General appearance normal   Vitals stable   Alert, oriented and in no acute distress     Eczematous palques on hands   R foot verrucous plaque     The remainder of expanded problem focused exam was unremarkable; the following areas were examined:  scalp/hair, conjunctiva/lids, face, neck, lips, chest, digits/nails, RUE, LUE.      Eyes: Conjunctivae/lids:Normal     ENT: Lips, buccal mucosa, tongue: normal    MSK:Normal    Cardiovascular: peripheral edema none    Pulm: Breathing Normal    Lymph Nodes: No Head and Neck Lymphadenopathy     Neuro/Psych: Orientation:Alert and Orientedx3 ; Mood/Affect:normal       A/P:  1. R foot wart  LN2:  Treated with LN2 for 5s for 1-2 cycles. Warned risks of blistering, pain, pigment change, scarring, and incomplete resolution.  Advised patient to return if lesions do not completely resolve.  Wound care sheet given.  2. Atopic derm  Light therapy discussed with patient  She will star this feb  Prednisone discussed with patient she declines  Imuran discussed with patient   She would like to switch from cell cept  Check cbc and cmp today  Stop cell cept  Start imuran 50  Return to clinic 4 weeks  Skin care regimen reviewed with patient: Eliminate harsh soaps, i.e. Dial, zest, irsih spring; Mild soaps such as Cetaphil or Dove sensitive skin, avoid hot or cold showers, aggressive use of emollients including vanicream, cetaphil or cerave discussed with patient.

## 2019-12-09 NOTE — LETTER
12/9/2019         RE: Ayala Wilson  [address] [facility] [address]        Dear Colleague,    Thank you for referring your patient, Ayala Wilson, to the [facility]. Please see a copy of my visit note below.    Ayala Wilson is a 59 year old year old female patient here today for hx of psoriasis and atopic derm.  Previous bx showed psoriasis, she failed methotreate,  Repeat bx showed spong derm,  She failed dupixient and methotrexate and light in the past.  She still has spots on hands and is now on cell cept without help.  She notes tender spot on right foot.   .  Patient states this has been present for a while.  Patient reports the following symptoms:  tender.  Patient reports the following previous treatments crypo.  These treatments did not work.  Patient reports the following modifying factors none.  Associated symptoms: none.  Patient has no other skin complaints today.  Remainder of the HPI, Meds, PMH, Allergies, FH, and SH was reviewed in chart.      Past Medical History:   Diagnosis Date     Contact dermatitis and other eczema, due to unspecified cause      Tobacco use disorder        Past Surgical History:   Procedure Laterality Date     SURGICAL HISTORY OF -   1974    appendectomy        Family History   Problem Relation Age of Onset     Cerebrovascular Disease Mother         CVA     Cancer Father         bone     Gastrointestinal Disease Daughter         crohn's       Social History     Socioeconomic History     Marital status:      Spouse name: Not on file     Number of children: Not on file     Years of education: Not on file     Highest education level: Not on file   Occupational History     Occupation:      Employer: [employer]     Comment: 18+ years (in 2018)   Social Needs     Financial resource strain: Not on file     Food insecurity:     Worry: Not on file     Inability: Not on file     Transportation needs:     Medical: Not  on file     Non-medical: Not on file   Tobacco Use     Smoking status: Current Some Day Smoker     Packs/day: 1.00     Years: 30.00     Pack years: 30.00     Types: Cigarettes     Smokeless tobacco: Never Used   Substance and Sexual Activity     Alcohol use: No     Drug use: No     Sexual activity: Yes     Partners: Male   Lifestyle     Physical activity:     Days per week: Not on file     Minutes per session: Not on file     Stress: Not on file   Relationships     Social connections:     Talks on phone: Not on file     Gets together: Not on file     Attends Baptism service: Not on file     Active member of club or organization: Not on file     Attends meetings of clubs or organizations: Not on file     Relationship status:      Intimate partner violence:     Fear of current or ex partner: Not on file     Emotionally abused: Not on file     Physically abused: Not on file     Forced sexual activity: Not on file   Other Topics Concern     Parent/sibling w/ CABG, MI or angioplasty before 65F 55M? No   Social History Narrative    01/14/19    Patient lives alone.       Outpatient Encounter Medications as of 12/9/2019   Medication Sig Dispense Refill     azaTHIOprine (IMURAN) 50 MG tablet Take 1 tablet (50 mg) by mouth daily 60 tablet 1     CELLCEPT (BRAND) 500 MG tablet Take 2 tablets (1,000 mg) by mouth 2 times daily 180 tablet 0     fluocinonide (LIDEX) 0.05 % external cream Apply topically 2 times daily To affected areas 60 g 3     levETIRAcetam (KEPPRA) 1000 MG tablet Take 1 tablet (1,000 mg) by mouth 2 times daily PLEASE CALL PATIENT WHEN READY TO  180 tablet 0     hydrOXYzine (ATARAX) 25 MG tablet Take 1 tablet (25 mg) by mouth two times daily (Patient not taking: Reported on 10/8/2019) 60 tablet 3     LEVETIRACETAM PO Take 1,000 mg by mouth 2 times daily       No facility-administered encounter medications on file as of 12/9/2019.              Review Of Systems  Skin: As above  Eyes:  negative  Ears/Nose/Throat: negative  Respiratory: No shortness of breath, dyspnea on exertion, cough, or hemoptysis  Cardiovascular: negative  Gastrointestinal: negative  Genitourinary: negative  Musculoskeletal: negative  Neurologic: negative  Psychiatric: negative  Hematologic/Lymphatic/Immunologic: negative  Endocrine: negative      O:   NAD, WDWN, Alert & Oriented, Mood & Affect wnl, Vitals stable   Here today alone   /78   Pulse 76   LMP 04/03/2007   SpO2 97%    General appearance normal   Vitals stable   Alert, oriented and in no acute distress     Eczematous palques on hands   R foot verrucous plaque     The remainder of expanded problem focused exam was unremarkable; the following areas were examined:  scalp/hair, conjunctiva/lids, face, neck, lips, chest, digits/nails, RUE, LUE.      Eyes: Conjunctivae/lids:Normal     ENT: Lips, buccal mucosa, tongue: normal    MSK:Normal    Cardiovascular: peripheral edema none    Pulm: Breathing Normal    Lymph Nodes: No Head and Neck Lymphadenopathy     Neuro/Psych: Orientation:Alert and Orientedx3 ; Mood/Affect:normal       A/P:  1. R foot wart  LN2:  Treated with LN2 for 5s for 1-2 cycles. Warned risks of blistering, pain, pigment change, scarring, and incomplete resolution.  Advised patient to return if lesions do not completely resolve.  Wound care sheet given.  2. Atopic derm  Light therapy discussed with patient  She will star this feb  Prednisone discussed with patient she declines  Imuran discussed with patient   She would like to switch from cell cept  Check cbc and cmp today  Stop cell cept  Start imuran 50  Return to clinic 4 weeks  Skin care regimen reviewed with patient: Eliminate harsh soaps, i.e. Dial, zest, irsih spring; Mild soaps such as Cetaphil or Dove sensitive skin, avoid hot or cold showers, aggressive use of emollients including vanicream, cetaphil or cerave discussed with patient.        Again, thank you for allowing me to participate  in the care of your patient.        Sincerely,        Vijay Cox MD

## 2020-02-03 DIAGNOSIS — G40.909 SEIZURE DISORDER (H): ICD-10-CM

## 2020-02-03 RX ORDER — LEVETIRACETAM 1000 MG/1
1000 TABLET ORAL 2 TIMES DAILY
Qty: 180 TABLET | Refills: 0 | Status: SHIPPED | OUTPATIENT
Start: 2020-02-03 | End: 2020-05-01

## 2020-02-03 NOTE — TELEPHONE ENCOUNTER
Requested Prescriptions   Pending Prescriptions Disp Refills     levETIRAcetam (KEPPRA) 1000 MG tablet 180 tablet 0     Sig: Take 1 tablet (1,000 mg) by mouth 2 times daily PLEASE CALL PATIENT WHEN READY TO        There is no refill protocol information for this order        Last office visit: 11/21/2019 with prescribing provider:  Yani   Future Office Visit:   Next 5 appointments (look out 90 days)    Feb 05, 2020  9:45 AM CST  Return Visit with Vijay Cox MD  [facility] [address] [facility] [address]  [phone]   Apr 23, 2020  8:00 AM CDT  Return Visit with Kay Lomeli MD  [facility] ([facility]) [address] [facility] [address]  [phone]         Denise Behrendt  Specialty CSS

## 2020-02-05 ENCOUNTER — OFFICE VISIT (OUTPATIENT)
Dept: DERMATOLOGY | Facility: CLINIC | Age: 60
End: 2020-02-05
Payer: COMMERCIAL

## 2020-02-05 VITALS
SYSTOLIC BLOOD PRESSURE: 112 MMHG | BODY MASS INDEX: 20.59 KG/M2 | HEIGHT: 69 IN | HEART RATE: 72 BPM | DIASTOLIC BLOOD PRESSURE: 82 MMHG

## 2020-02-05 DIAGNOSIS — L20.84 INTRINSIC ATOPIC DERMATITIS: Primary | ICD-10-CM

## 2020-02-05 DIAGNOSIS — B07.8 COMMON WART: ICD-10-CM

## 2020-02-05 DIAGNOSIS — L20.81 ATOPIC NEURODERMATITIS: ICD-10-CM

## 2020-02-05 DIAGNOSIS — I73.00 RAYNAUD'S PHENOMENON WITHOUT GANGRENE: ICD-10-CM

## 2020-02-05 LAB
ALBUMIN SERPL-MCNC: 4 G/DL (ref 3.4–5)
ALP SERPL-CCNC: 82 U/L (ref 40–150)
ALT SERPL W P-5'-P-CCNC: 26 U/L (ref 0–50)
ANION GAP SERPL CALCULATED.3IONS-SCNC: 4 MMOL/L (ref 3–14)
AST SERPL W P-5'-P-CCNC: 19 U/L (ref 0–45)
BILIRUB SERPL-MCNC: 0.4 MG/DL (ref 0.2–1.3)
BUN SERPL-MCNC: 15 MG/DL (ref 7–30)
CALCIUM SERPL-MCNC: 9.3 MG/DL (ref 8.5–10.1)
CHLORIDE SERPL-SCNC: 106 MMOL/L (ref 94–109)
CO2 SERPL-SCNC: 27 MMOL/L (ref 20–32)
CREAT SERPL-MCNC: 0.69 MG/DL (ref 0.52–1.04)
ERYTHROCYTE [DISTWIDTH] IN BLOOD BY AUTOMATED COUNT: 13.5 % (ref 10–15)
GFR SERPL CREATININE-BSD FRML MDRD: >90 ML/MIN/{1.73_M2}
GLUCOSE SERPL-MCNC: 82 MG/DL (ref 70–99)
HCT VFR BLD AUTO: 45.9 % (ref 35–47)
HGB BLD-MCNC: 14.8 G/DL (ref 11.7–15.7)
MCH RBC QN AUTO: 29.8 PG (ref 26.5–33)
MCHC RBC AUTO-ENTMCNC: 32.2 G/DL (ref 31.5–36.5)
MCV RBC AUTO: 93 FL (ref 78–100)
PLATELET # BLD AUTO: 273 10E9/L (ref 150–450)
POTASSIUM SERPL-SCNC: 3.8 MMOL/L (ref 3.4–5.3)
PROT SERPL-MCNC: 7.8 G/DL (ref 6.8–8.8)
RBC # BLD AUTO: 4.96 10E12/L (ref 3.8–5.2)
SODIUM SERPL-SCNC: 137 MMOL/L (ref 133–144)
WBC # BLD AUTO: 6.9 10E9/L (ref 4–11)

## 2020-02-05 PROCEDURE — 85027 COMPLETE CBC AUTOMATED: CPT | Performed by: DERMATOLOGY

## 2020-02-05 PROCEDURE — 86147 CARDIOLIPIN ANTIBODY EA IG: CPT | Performed by: DERMATOLOGY

## 2020-02-05 PROCEDURE — 36415 COLL VENOUS BLD VENIPUNCTURE: CPT | Performed by: DERMATOLOGY

## 2020-02-05 PROCEDURE — 86146 BETA-2 GLYCOPROTEIN ANTIBODY: CPT | Performed by: DERMATOLOGY

## 2020-02-05 PROCEDURE — 85730 THROMBOPLASTIN TIME PARTIAL: CPT | Performed by: DERMATOLOGY

## 2020-02-05 PROCEDURE — 17110 DESTRUCTION B9 LES UP TO 14: CPT | Performed by: DERMATOLOGY

## 2020-02-05 PROCEDURE — 00000401 ZZHCL STATISTIC THROMBIN TIME NC: Performed by: DERMATOLOGY

## 2020-02-05 PROCEDURE — 00000167 ZZHCL STATISTIC INR NC: Performed by: DERMATOLOGY

## 2020-02-05 PROCEDURE — 80053 COMPREHEN METABOLIC PANEL: CPT | Performed by: DERMATOLOGY

## 2020-02-05 PROCEDURE — 84165 PROTEIN E-PHORESIS SERUM: CPT | Performed by: DERMATOLOGY

## 2020-02-05 PROCEDURE — 00000402 ZZHCL STATISTIC TOTAL PROTEIN: Performed by: DERMATOLOGY

## 2020-02-05 PROCEDURE — 85613 RUSSELL VIPER VENOM DILUTED: CPT | Performed by: DERMATOLOGY

## 2020-02-05 PROCEDURE — 99214 OFFICE O/P EST MOD 30 MIN: CPT | Mod: 25 | Performed by: DERMATOLOGY

## 2020-02-05 PROCEDURE — 82595 ASSAY OF CRYOGLOBULIN: CPT | Performed by: DERMATOLOGY

## 2020-02-05 PROCEDURE — 86235 NUCLEAR ANTIGEN ANTIBODY: CPT | Performed by: DERMATOLOGY

## 2020-02-05 PROCEDURE — 86038 ANTINUCLEAR ANTIBODIES: CPT | Performed by: DERMATOLOGY

## 2020-02-05 NOTE — PROGRESS NOTES
Ayala Wilson is a 60 year old year old female patient here today for f/uatopic derm.  .  Patient states this has been present for  A while. Failed dupixient, cant afford nbuvb, failed cell cept, imuran and methotrexate.  She notes tender wart on right foot and spot son right toes.  .  Patient reports the following symptoms:  A while.  Patient reports the following previous treatments blue toes.  These treatments did not work.  Patient reports the following modifying factors none.  Associated symptoms: none.  Patient has no other skin complaints today.  Remainder of the HPI, Meds, PMH, Allergies, FH, and SH was reviewed in chart.      Past Medical History:   Diagnosis Date     Contact dermatitis and other eczema, due to unspecified cause      Tobacco use disorder        Past Surgical History:   Procedure Laterality Date     SURGICAL HISTORY OF -   1974    appendectomy        Family History   Problem Relation Age of Onset     Cerebrovascular Disease Mother         CVA     Cancer Father         bone     Gastrointestinal Disease Daughter         crohn's       Social History     Socioeconomic History     Marital status:      Spouse name: Not on file     Number of children: Not on file     Years of education: Not on file     Highest education level: Not on file   Occupational History     Occupation:      Employer: [employer]     Comment: 18+ years (in 2018)   Social Needs     Financial resource strain: Not on file     Food insecurity:     Worry: Not on file     Inability: Not on file     Transportation needs:     Medical: Not on file     Non-medical: Not on file   Tobacco Use     Smoking status: Current Some Day Smoker     Packs/day: 1.00     Years: 30.00     Pack years: 30.00     Types: Cigarettes     Smokeless tobacco: Never Used   Substance and Sexual Activity     Alcohol use: No     Drug use: No     Sexual activity: Yes     Partners: Male   Lifestyle     Physical activity:     Days  per week: Not on file     Minutes per session: Not on file     Stress: Not on file   Relationships     Social connections:     Talks on phone: Not on file     Gets together: Not on file     Attends Mor[employer] service: Not on file     Active member of club or organization: Not on file     Attends meetings of clubs or organizations: Not on file     Relationship status:      Intimate partner violence:     Fear of current or ex partner: Not on file     Emotionally abused: Not on file     Physically abused: Not on file     Forced sexual activity: Not on file   Other Topics Concern     Parent/sibling w/ CABG, MI or angioplasty before 65F 55M? No   Social History Narrative    01/14/19    Patient lives alone.       Outpatient Encounter Medications as of 2/5/2020   Medication Sig Dispense Refill     fluocinonide (LIDEX) 0.05 % external cream Apply topically 2 times daily To affected areas 60 g 3     levETIRAcetam (KEPPRA) 1000 MG tablet Take 1 tablet (1,000 mg) by mouth 2 times daily PLEASE CALL PATIENT WHEN READY TO  180 tablet 0     azaTHIOprine (IMURAN) 50 MG tablet Take 1 tablet (50 mg) by mouth daily (Patient not taking: Reported on 2/5/2020) 60 tablet 1     CELLCEPT (BRAND) 500 MG tablet Take 2 tablets (1,000 mg) by mouth 2 times daily (Patient not taking: Reported on 2/5/2020) 180 tablet 0     hydrOXYzine (ATARAX) 25 MG tablet Take 1 tablet (25 mg) by mouth two times daily (Patient not taking: Reported on 10/8/2019) 60 tablet 3     LEVETIRACETAM PO Take 1,000 mg by mouth 2 times daily       No facility-administered encounter medications on file as of 2/5/2020.              Review Of Systems  Skin: As above  Eyes: negative  Ears/Nose/Throat: negative  Respiratory: No shortness of breath, dyspnea on exertion, cough, or hemoptysis  Cardiovascular: negative  Gastrointestinal: negative  Genitourinary: negative  Musculoskeletal: negative  Neurologic: negative  Psychiatric:  "negative  Hematologic/Lymphatic/Immunologic: negative  Endocrine: negative      O:   NAD, WDWN, Alert & Oriented, Mood & Affect wnl, Vitals stable   Here today alone   /82   Pulse 72   Ht 1.74 m (5' 8.5\")   LMP 04/03/2007   BMI 20.59 kg/m     General appearance normal   Vitals stable   Alert, oriented and in no acute distress      Following lymph nodes palpated: Occipital, Cervical, Supraclavicular no lad   eczematou splaques on hands    R foot wart   Right toes all blue and purple     The remainder of expanded problem focused exam was unremarkable; the following areas were examined:  scalp/hair, conjunctiva/lids, face, neck, lips, chest, digits/nails, RUE, LUE.      Eyes: Conjunctivae/lids:Normal     ENT: Lips, buccal mucosa, tongue: normal    MSK:Normal    Cardiovascular: peripheral edema none    Pulm: Breathing Normal    Lymph Nodes: No Head and Neck Lymphadenopathy     Neuro/Psych: Orientation:Alert and Orientedx3 ; Mood/Affect:normal       A/P:  1. R foot wart  LN2:  Treated with LN2 for 5s for 1-2 cycles. Warned risks of blistering, pain, pigment change, scarring, and incomplete resolution.  Advised patient to return if lesions do not completely resolve.  Wound care sheet given.  2. Raynauds  Check cbc, cmp, james, tod palenl. Spep, cold agg,   3. Atopic derm  She would like to stick with topiclas for now  Skin care  Bleach baths and topilas discussed with patient    Skin care regimen reviewed with patient: Eliminate harsh soaps, i.e. Dial, zest, irsih spring; Mild soaps such as Cetaphil or Dove sensitive skin, avoid hot or cold showers, aggressive use of emollients including vanicream, cetaphil or cerave discussed with patient.    Return to clinic pending labs    "

## 2020-02-05 NOTE — NURSING NOTE
"Initial /82   Pulse 72   Ht 1.74 m (5' 8.5\")   LMP 04/03/2007   BMI 20.59 kg/m   Estimated body mass index is 20.59 kg/m  as calculated from the following:    Height as of this encounter: 1.74 m (5' 8.5\").    Weight as of 11/21/19: 62.3 kg (137 lb 6.4 oz). .      "

## 2020-02-05 NOTE — LETTER
2/5/2020         RE: Ayala Wilson  [address] [facility] [address]        Dear Colleague,    Thank you for referring your patient, Ayala Wilson, to the [facility]. Please see a copy of my visit note below.    Ayala Wilson is a 60 year old year old female patient here today for f/uatopic derm.  .  Patient states this has been present for  A while. Failed dupixient, cant afford nbuvb, failed cell cept, imuran and methotrexate.  She notes tender wart on right foot and spot son right toes.  .  Patient reports the following symptoms:  A while.  Patient reports the following previous treatments blue toes.  These treatments did not work.  Patient reports the following modifying factors none.  Associated symptoms: none.  Patient has no other skin complaints today.  Remainder of the HPI, Meds, PMH, Allergies, FH, and SH was reviewed in chart.      Past Medical History:   Diagnosis Date     Contact dermatitis and other eczema, due to unspecified cause      Tobacco use disorder        Past Surgical History:   Procedure Laterality Date     SURGICAL HISTORY OF -   1974    appendectomy        Family History   Problem Relation Age of Onset     Cerebrovascular Disease Mother         CVA     Cancer Father         bone     Gastrointestinal Disease Daughter         crohn's       Social History     Socioeconomic History     Marital status:      Spouse name: Not on file     Number of children: Not on file     Years of education: Not on file     Highest education level: Not on file   Occupational History     Occupation:      Employer: [employer]     Comment: 18+ years (in 2018)   Social Needs     Financial resource strain: Not on file     Food insecurity:     Worry: Not on file     Inability: Not on file     Transportation needs:     Medical: Not on file     Non-medical: Not on file   Tobacco Use     Smoking status: Current Some Day Smoker     Packs/day: 1.00      Years: 30.00     Pack years: 30.00     Types: Cigarettes     Smokeless tobacco: Never Used   Substance and Sexual Activity     Alcohol use: No     Drug use: No     Sexual activity: Yes     Partners: Male   Lifestyle     Physical activity:     Days per week: Not on file     Minutes per session: Not on file     Stress: Not on file   Relationships     Social connections:     Talks on phone: Not on file     Gets together: Not on file     Attends Evangelical service: Not on file     Active member of club or organization: Not on file     Attends meetings of clubs or organizations: Not on file     Relationship status:      Intimate partner violence:     Fear of current or ex partner: Not on file     Emotionally abused: Not on file     Physically abused: Not on file     Forced sexual activity: Not on file   Other Topics Concern     Parent/sibling w/ CABG, MI or angioplasty before 65F 55M? No   Social History Narrative    01/14/19    Patient lives alone.       Outpatient Encounter Medications as of 2/5/2020   Medication Sig Dispense Refill     fluocinonide (LIDEX) 0.05 % external cream Apply topically 2 times daily To affected areas 60 g 3     levETIRAcetam (KEPPRA) 1000 MG tablet Take 1 tablet (1,000 mg) by mouth 2 times daily PLEASE CALL PATIENT WHEN READY TO  180 tablet 0     azaTHIOprine (IMURAN) 50 MG tablet Take 1 tablet (50 mg) by mouth daily (Patient not taking: Reported on 2/5/2020) 60 tablet 1     CELLCEPT (BRAND) 500 MG tablet Take 2 tablets (1,000 mg) by mouth 2 times daily (Patient not taking: Reported on 2/5/2020) 180 tablet 0     hydrOXYzine (ATARAX) 25 MG tablet Take 1 tablet (25 mg) by mouth two times daily (Patient not taking: Reported on 10/8/2019) 60 tablet 3     LEVETIRACETAM PO Take 1,000 mg by mouth 2 times daily       No facility-administered encounter medications on file as of 2/5/2020.              Review Of Systems  Skin: As above  Eyes: negative  Ears/Nose/Throat:  "negative  Respiratory: No shortness of breath, dyspnea on exertion, cough, or hemoptysis  Cardiovascular: negative  Gastrointestinal: negative  Genitourinary: negative  Musculoskeletal: negative  Neurologic: negative  Psychiatric: negative  Hematologic/Lymphatic/Immunologic: negative  Endocrine: negative      O:   NAD, WDWN, Alert & Oriented, Mood & Affect wnl, Vitals stable   Here today alone   /82   Pulse 72   Ht 1.74 m (5' 8.5\")   LMP 04/03/2007   BMI 20.59 kg/m      General appearance normal   Vitals stable   Alert, oriented and in no acute distress      Following lymph nodes palpated: Occipital, Cervical, Supraclavicular no lad   eczematou splaques on hands    R foot wart   Right toes all blue and purple     The remainder of expanded problem focused exam was unremarkable; the following areas were examined:  scalp/hair, conjunctiva/lids, face, neck, lips, chest, digits/nails, RUE, LUE.      Eyes: Conjunctivae/lids:Normal     ENT: Lips, buccal mucosa, tongue: normal    MSK:Normal    Cardiovascular: peripheral edema none    Pulm: Breathing Normal    Lymph Nodes: No Head and Neck Lymphadenopathy     Neuro/Psych: Orientation:Alert and Orientedx3 ; Mood/Affect:normal       A/P:  1. R foot wart  LN2:  Treated with LN2 for 5s for 1-2 cycles. Warned risks of blistering, pain, pigment change, scarring, and incomplete resolution.  Advised patient to return if lesions do not completely resolve.  Wound care sheet given.  2. Raynauds  Check cbc, cmp, james, tod palenl. Spep, cold agg,   3. Atopic derm  She would like to stick with topiclas for now  Skin care  Bleach baths and topilas discussed with patient    Skin care regimen reviewed with patient: Eliminate harsh soaps, i.e. Dial, zest, irsih spring; Mild soaps such as Cetaphil or Dove sensitive skin, avoid hot or cold showers, aggressive use of emollients including vanicream, cetaphil or cerave discussed with patient.    Return to clinic pending labs      Again, " thank you for allowing me to participate in the care of your patient.        Sincerely,        Vijay Cox MD

## 2020-02-06 LAB
ANA SER QL IF: NEGATIVE
B2 GLYCOPROT1 IGG SERPL IA-ACNC: 1 U/ML
B2 GLYCOPROT1 IGM SERPL IA-ACNC: <2.9 U/ML
CARDIOLIPIN ANTIBODY IGG: <1.6 GPL-U/ML (ref 0–19.9)
CARDIOLIPIN ANTIBODY IGM: 0.3 MPL-U/ML (ref 0–19.9)
ENA RNP IGG SER IA-ACNC: <0.2 AI (ref 0–0.9)
ENA SM IGG SER-ACNC: <0.2 AI (ref 0–0.9)
ENA SS-A IGG SER IA-ACNC: <0.2 AI (ref 0–0.9)
ENA SS-B IGG SER IA-ACNC: <0.2 AI (ref 0–0.9)

## 2020-02-07 LAB
ALBUMIN SERPL ELPH-MCNC: 4.6 G/DL (ref 3.7–5.1)
ALPHA1 GLOB SERPL ELPH-MCNC: 0.2 G/DL (ref 0.2–0.4)
ALPHA2 GLOB SERPL ELPH-MCNC: 0.7 G/DL (ref 0.5–0.9)
B-GLOBULIN SERPL ELPH-MCNC: 0.9 G/DL (ref 0.6–1)
GAMMA GLOB SERPL ELPH-MCNC: 1 G/DL (ref 0.7–1.6)
LA PPP-IMP: NEGATIVE
M PROTEIN SERPL ELPH-MCNC: 0 G/DL
PROT PATTERN SERPL ELPH-IMP: NORMAL

## 2020-02-11 LAB — CRYOGLOB SER QL: ABNORMAL %

## 2020-02-11 RX ORDER — FLUOCINONIDE 0.5 MG/G
CREAM TOPICAL 2 TIMES DAILY
Qty: 60 G | Refills: 5 | Status: SHIPPED | OUTPATIENT
Start: 2020-02-11 | End: 2021-03-17

## 2020-03-30 ENCOUNTER — TELEPHONE (OUTPATIENT)
Dept: NEUROLOGY | Facility: CLINIC | Age: 60
End: 2020-03-30

## 2020-03-30 NOTE — TELEPHONE ENCOUNTER
Completed form faxed to [place] [employer] [phone].  Transmission confirmed via Right Fax.  Copy mailed to patient's home.  Copy scanned into [employer].

## 2020-04-03 NOTE — TELEPHONE ENCOUNTER
Pt states the [place] Dept of [employer] has not received the forms yet. Verified fax # [phone].     Pt requests to be called to make sure the appropriate forms are being faxed back: [phone] (ok to leave message)      Denise Behrendt Specialty CSS

## 2020-04-06 NOTE — TELEPHONE ENCOUNTER
"I spoke with Ayala this morning.  She has asked that the form be re-sent with letter ID #[id number].  I've re-sent the form via the [employer] Online Services.  Confirmation is [id number].      Quality of form is poor, so I was unable to add a \"submit every ___\" date that was originally omitted on signed form emailed to me by provider.  Date should default to 1 year.  "

## 2020-04-06 NOTE — TELEPHONE ENCOUNTER
I have re-submitted the form this morning via [employer].  Confirmation number is [id number].   https://dps.[place].gov/divisions/dvs/online-self-services/Pages/default.aspx    I've also checked her license status from the same site.  It shows her license is currently valid without restrictions.  However, if she received notice from them that they hadn't received the forms, then she may want to confirm with them they received it this time.      I will advise patient of this later this morning.

## 2020-04-14 ENCOUNTER — TELEPHONE (OUTPATIENT)
Dept: NEUROLOGY | Facility: CLINIC | Age: 60
End: 2020-04-14

## 2020-04-14 NOTE — TELEPHONE ENCOUNTER
"I contacted Ayala today to let her know Dr. Lomeli requested we reschedule her appointment out 2-3 months-- for Covid-19 precautions.  I also reminded her that Dr. Lomeli had ordered some imaging to be done before the visit.  She stated that she didn't recall having these scans ordered.  I offered to schedule her for June or July but patient declined, saying \"How am I supposed to get these major scans done now anyway?\"  I agreed that she would probably have to put the scans out a bit, as well, but that perhaps even a month from now could change the access significantly.  She will plan to check in a month or so on scheduling the imaging, and will call us to schedule with Dr. Lomeli at that time.    Her last comments to me were \"But I sure still have to pay for my health insurance\".  I apologized and agreed these were difficult times.  She was in tears as she ended the call.  "

## 2020-04-29 DIAGNOSIS — G40.909 SEIZURE DISORDER (H): ICD-10-CM

## 2020-04-29 NOTE — TELEPHONE ENCOUNTER
Requested Prescriptions   Pending Prescriptions Disp Refills     levETIRAcetam (KEPPRA) 1000 MG tablet 180 tablet 0     Sig: Take 1 tablet (1,000 mg) by mouth 2 times daily PLEASE CALL PATIENT WHEN READY TO        There is no refill protocol information for this order        Last Written Prescription Date:  2/3/20  Last Fill Quantity: 180,  # refills: 0   Last office visit: 5/14/2012 with prescribing provider:  Shawanda   Future Office Visit:

## 2020-04-30 NOTE — TELEPHONE ENCOUNTER
Pt is going to be out of medication in 3 days. Needing refill ASAP. Pt would like a 3 month supply.

## 2020-05-01 RX ORDER — LEVETIRACETAM 1000 MG/1
1000 TABLET ORAL 2 TIMES DAILY
Qty: 180 TABLET | Refills: 0 | Status: SHIPPED | OUTPATIENT
Start: 2020-05-01 | End: 2020-07-22 | Stop reason: DRUGHIGH

## 2020-05-01 NOTE — TELEPHONE ENCOUNTER
LOV 11/201/2019: Keppra level WNL   Return to Neurology in 4 months.     Patient waiting to have MRI completed then will schedule follow-up appointment. Is calling MRI today to see if they are able to schedule her or if she will need to wait due to COVID restrictions. Does need refill by weekend. Yomaira refill sent to avoid patient having break in treatment.     Would you like keppra level checked or okay to wait until follow-up visit?    Thank you,   Kyle NICOLAS RN   Specialty Clinics

## 2020-05-01 NOTE — TELEPHONE ENCOUNTER
I agree with just continuing the Keppra for now. MRI is not urgent, but if it can be safely done before her next appointment with me, that would be great. We can get a Keppra level when she follows up in clinic.       RANDALL

## 2020-07-14 ENCOUNTER — HOSPITAL ENCOUNTER (OUTPATIENT)
Dept: MRI IMAGING | Facility: CLINIC | Age: 60
Discharge: HOME | End: 2020-07-14
Attending: PSYCHIATRY & NEUROLOGY | Admitting: PSYCHIATRY & NEUROLOGY
Payer: COMMERCIAL

## 2020-07-14 DIAGNOSIS — G40.109 LOCALIZATION-RELATED FOCAL EPILEPSY WITH SIMPLE PARTIAL SEIZURES (H): ICD-10-CM

## 2020-07-14 DIAGNOSIS — R51.9 NEW ONSET HEADACHE: ICD-10-CM

## 2020-07-14 PROCEDURE — 25500064 ZZH RX 255 OP 636: Performed by: PSYCHIATRY & NEUROLOGY

## 2020-07-14 PROCEDURE — A9585 GADOBUTROL INJECTION: HCPCS | Performed by: PSYCHIATRY & NEUROLOGY

## 2020-07-14 PROCEDURE — 70553 MRI BRAIN STEM W/O & W/DYE: CPT

## 2020-07-14 RX ORDER — GADOBUTROL 604.72 MG/ML
6 INJECTION INTRAVENOUS ONCE
Status: COMPLETED | OUTPATIENT
Start: 2020-07-14 | End: 2020-07-14

## 2020-07-14 RX ADMIN — GADOBUTROL 6 ML: 604.72 INJECTION INTRAVENOUS at 07:43

## 2020-07-14 NOTE — RESULT ENCOUNTER NOTE
Please advise Ayala Wilson,  1960, that her MRI shows some chronic and age-related findings. Nothing acute. Nothing to explain the headaches/pulsating feeling. Will watch for upcoming CTA results.   [phone] (home)     Kay Lomeli MD

## 2020-07-21 ENCOUNTER — TELEPHONE (OUTPATIENT)
Dept: NEUROLOGY | Facility: CLINIC | Age: 60
End: 2020-07-21

## 2020-07-21 ENCOUNTER — HOSPITAL ENCOUNTER (OUTPATIENT)
Dept: CT IMAGING | Facility: CLINIC | Age: 60
Discharge: HOME | End: 2020-07-21
Attending: PSYCHIATRY & NEUROLOGY | Admitting: PSYCHIATRY & NEUROLOGY
Payer: COMMERCIAL

## 2020-07-21 DIAGNOSIS — G40.109 LOCALIZATION-RELATED FOCAL EPILEPSY WITH SIMPLE PARTIAL SEIZURES (H): ICD-10-CM

## 2020-07-21 DIAGNOSIS — G40.909 SEIZURE DISORDER (H): Primary | ICD-10-CM

## 2020-07-21 DIAGNOSIS — R51.9 NEW ONSET HEADACHE: ICD-10-CM

## 2020-07-21 PROCEDURE — 25000128 H RX IP 250 OP 636: Performed by: RADIOLOGY

## 2020-07-21 PROCEDURE — 25000125 ZZHC RX 250: Performed by: RADIOLOGY

## 2020-07-21 PROCEDURE — 70496 CT ANGIOGRAPHY HEAD: CPT

## 2020-07-21 RX ORDER — IOPAMIDOL 755 MG/ML
70 INJECTION, SOLUTION INTRAVASCULAR ONCE
Status: COMPLETED | OUTPATIENT
Start: 2020-07-21 | End: 2020-07-21

## 2020-07-21 RX ADMIN — IOPAMIDOL 70 ML: 755 INJECTION, SOLUTION INTRAVENOUS at 08:00

## 2020-07-21 RX ADMIN — SODIUM CHLORIDE 100 ML: 9 INJECTION, SOLUTION INTRAVENOUS at 08:00

## 2020-07-21 NOTE — RESULT ENCOUNTER NOTE
Please advise Ayala Wilson,  1960, that her vessel imaging is normal, no significant narrowing of the arteries.   [phone] (home)   Kay Lomeli MD

## 2020-07-21 NOTE — TELEPHONE ENCOUNTER
I advised Ms. Wilson of her CTA and MRI results.  Based on these results, she's wondering if you have any thoughts about taking her off Keppra at this time.      Thank you.

## 2020-07-22 NOTE — TELEPHONE ENCOUNTER
Patient advised.  She's asking for us to send a prescription for 500mg tablets for ease of dosing.      Cued up.

## 2020-07-23 RX ORDER — LEVETIRACETAM 500 MG/1
TABLET ORAL
Qty: 50 TABLET | Refills: 0 | Status: SHIPPED | OUTPATIENT
Start: 2020-07-23 | End: 2020-08-11

## 2020-08-10 DIAGNOSIS — G40.909 SEIZURE DISORDER (H): ICD-10-CM

## 2020-08-10 NOTE — TELEPHONE ENCOUNTER
Requested Prescriptions   Pending Prescriptions Disp Refills     levETIRAcetam (KEPPRA) 500 MG tablet 50 tablet 0     Sig: Take 500mg in the morning and 1000mg in the evening for one week, then 500mg twice daily for one week, then 500mg daily for one week and then stop.       There is no refill protocol information for this order        Last office visit: 11/21/2019 with prescribing provider:  Dr. Lomeli   Future Office Visit:          Denise Behrendt  Specialty CSS

## 2020-08-11 RX ORDER — LEVETIRACETAM 500 MG/1
TABLET ORAL
Qty: 0.0001 TABLET | Refills: 0 | Status: SHIPPED | OUTPATIENT
Start: 2020-08-11 | End: 2021-08-13

## 2020-11-20 ENCOUNTER — OFFICE VISIT (OUTPATIENT)
Dept: FAMILY MEDICINE | Facility: CLINIC | Age: 60
End: 2020-11-20
Payer: COMMERCIAL

## 2020-11-20 ENCOUNTER — VIRTUAL VISIT (OUTPATIENT)
Dept: FAMILY MEDICINE | Facility: CLINIC | Age: 60
End: 2020-11-20
Payer: COMMERCIAL

## 2020-11-20 DIAGNOSIS — R05.9 COUGH: ICD-10-CM

## 2020-11-20 DIAGNOSIS — R05.9 COUGH: Primary | ICD-10-CM

## 2020-11-20 PROCEDURE — U0003 INFECTIOUS AGENT DETECTION BY NUCLEIC ACID (DNA OR RNA); SEVERE ACUTE RESPIRATORY SYNDROME CORONAVIRUS 2 (SARS-COV-2) (CORONAVIRUS DISEASE [COVID-19]), AMPLIFIED PROBE TECHNIQUE, MAKING USE OF HIGH THROUGHPUT TECHNOLOGIES AS DESCRIBED BY CMS-2020-01-R: HCPCS | Performed by: FAMILY MEDICINE

## 2020-11-20 PROCEDURE — 99203 OFFICE O/P NEW LOW 30 MIN: CPT | Mod: TEL | Performed by: FAMILY MEDICINE

## 2020-11-20 PROCEDURE — 99207 PR NO CHARGE NURSE ONLY: CPT

## 2020-11-20 NOTE — PROGRESS NOTES
"Ayala Wilson is a 60 year old female who is being evaluated via a billable telephone visit.      The patient has been notified of following:     \"This telephone visit will be conducted via a call between you and your physician/provider. We have found that certain health care needs can be provided without the need for a physical exam.  This service lets us provide the care you need with a short phone conversation.  If a prescription is necessary we can send it directly to your pharmacy.  If lab work is needed we can place an order for that and you can then stop by our lab to have the test done at a later time.    Telephone visits are billed at different rates depending on your insurance coverage. During this emergency period, for some insurers they may be billed the same as an in-person visit.  Please reach out to your insurance provider with any questions.    If during the course of the call the physician/provider feels a telephone visit is not appropriate, you will not be charged for this service.\"    Patient has given verbal consent for Telephone visit?  Yes    What phone number would you like to be contacted at? [phone]    How would you like to obtain your AVS? Mail a copy    Subjective     Ayala Wilson is a 60 year old female who presents via phone visit today for the following health issues:    HPI       Concern for COVID-19  About how many days ago did these symptoms start? 10 days  Is this your first visit for this illness? Yes  In the 14 days before your symptoms started, have you had close contact with someone with COVID-19 (Coronavirus)? I do not know.  Do you have a fever or chills? Yes, I felt feverish or had chills  Are you having new or worsening difficulty breathing? Yes   Please describe what kind of difficulty you are having breathing:Mild dyspnea (decreased exercise tolerance, able to do ADLs without difficulty)  Do you have new or worsening cough? Yes, I am coughing up " mucus.  Have you had any new or unexplained body aches? No    Have you experienced any of the following NEW symptoms?    Headache: YES    Sore throat: No    Loss of taste or smell: No    Chest pain: No    Diarrhea: No    Rash: No  What treatments have you tried? Cough drops and tylenol   Who do you live with? No one   Are you, or a household member, a healthcare worker or a ? No  Do you live in a nursing home, group home, or shelter? No  Do you have a way to get food/medications if quarantined? Yes, I have a friend or family member who can help me.                   Review of Systems          Objective          Vitals:  No vitals were obtained today due to virtual visit.      PSYCH: Alert and oriented times 3; coherent speech, normal   rate and volume, able to articulate logical thoughts, able   to abstract reason, no tangential thoughts, no hallucinations   or delusions  Her affect is   RESP: No cough, no audible wheezing, able to talk in full sentences  Remainder of exam unable to be completed due to telephone visits            Assessment/Plan:    ASSESSMENT:  Cough        PLAN:  Orders Placed This Encounter     Symptomatic COVID-19 Virus (Coronavirus) by PCR         Phone call duration:  12 minutes

## 2020-11-21 LAB
SARS-COV-2 RNA SPEC QL NAA+PROBE: NOT DETECTED
SPECIMEN SOURCE: NORMAL

## 2021-01-19 ENCOUNTER — TELEPHONE (OUTPATIENT)
Dept: FAMILY MEDICINE | Facility: CLINIC | Age: 61
End: 2021-01-19

## 2021-01-19 NOTE — TELEPHONE ENCOUNTER
Panel Management Review      Patient has the following on her problem list:       Composite cancer screening  Chart review shows that this patient is due/due soon for the following Pap Smear and Mammogram  Summary:    Patient is due/failing the following:   MAMMOGRAM, PAP and PHYSICAL    Action needed:   Patient needs office visit for physical.    Type of outreach:    Sent letter.    Questions for provider review:    None                                                                                                                                    Cheryl Tate CMA     Chart routed to  .

## 2021-01-19 NOTE — LETTER
January 19, 2021      Ayala Wilson  [address] [address]        Dear Ayala,    We care about your health and have reviewed your health plan including your medical conditions, medication list, and lab results.  Based on this review, it is recommended that you follow up regarding the following health topic(s):     -Breast Cancer Screening  -Cervical Cancer Screening  -Wellness (Physical) Visit     We recommend you take the following action(s):      Please call us at [phone] (or use Qian Xiaoâ€™er) to address the above recommendations.     Thank you for trusting [facility] and we appreciate the opportunity to serve you.  We look forward to supporting your healthcare needs in the future.    Healthy Regards,      Your Health Care Team  [facility]          Sincerely,        Heath Mayberry MD

## 2021-03-08 ENCOUNTER — TELEPHONE (OUTPATIENT)
Dept: FAMILY MEDICINE | Facility: CLINIC | Age: 61
End: 2021-03-08

## 2021-03-08 NOTE — LETTER
March 8, 2021      Ayala Wilson  [address] [facility] [address]        Dear Ayala,    We care about your health and have reviewed your health plan including your medical conditions, medication list, and lab results.  Based on this review, it is recommended that you follow up regarding the following health topic(s):     -Cholesterol  -Cervical Cancer Screening  -Wellness (Physical) Visit     We recommend you take the following action(s):  -schedule a WELLNESS (Physical) APPOINTMENT.  We will perform the following labs: Lipids (fasting cholesterol - nothing to eat except water and/or meds for 8-10 hours).  -schedule a MAMMOGRAM which is due. Please disregard this reminder if you have had this exam elsewhere within the last 1-2 years please let us know so we can update your records.  -schedule a PAP SMEAR EXAM which is due.  Please disregard this reminder if you have had this exam elsewhere within the last year.  It would be helpful for us to have a copy of your recent pap smear report to update your records.    Please call us at [phone] (or use N-1-1) to address the above recommendations.     Thank you for trusting [facility] and we appreciate the opportunity to serve you.  We look forward to supporting your healthcare needs in the future.    Healthy Regards,      Your Health Care Team  [facility]      Sincerely,  Heath Mayberry MD

## 2021-03-08 NOTE — TELEPHONE ENCOUNTER
Panel Management Review      Patient has the following on her problem list:       Composite cancer screening  Chart review shows that this patient is due/due soon for the following Pap Smear and Mammogram  Summary:    Patient is due/failing the following:   LDL, MAMMOGRAM, PAP and PHYSICAL    Action needed:   Patient needs office visit for Physical.    Type of outreach:    Sent letter.    Questions for provider review:    None                                                                                                                                    Cheryl Tate CMA     Chart routed to  .

## 2021-03-16 DIAGNOSIS — L20.81 ATOPIC NEURODERMATITIS: ICD-10-CM

## 2021-03-16 NOTE — LETTER
[facility] [place]  [address] [address]  Phone: [phone]    March 17, 2021    Ayala Wilson                                                                                                       [address] [address]            Dear Ms. Wilson,    We are concerned about your health care.  We recently provided you with a medication refill.  Medications require routine follow-up with your Dermatology Provider.      At this time we ask that: You schedule a routine office visit with your Dermatology Provider to follow your Atopic dermatitis. Per our medication refill protocol, you need to be seen at least annually to renew a prescription while on prescribed medication(s).     Your prescription: Has been refilled for 1 month so you may have time for the above noted follow-up. Please be seen prior to needing your next refill of medication and you may be seen for follow up via virtual video if you prefer due to the Covid-19 pandemic.    Thank you,      Vijay Cox MD/ [facility]

## 2021-03-16 NOTE — TELEPHONE ENCOUNTER
Requested Prescriptions   Pending Prescriptions Disp Refills     fluocinonide (LIDEX) 0.05 % external cream 60 g 5     Sig: Apply topically 2 times daily To affected areas       There is no refill protocol information for this order        Last office visit: 2/5/2020 with prescribing provider:  Dr. Cox   Future Office Visit:          Denise Behrendt  Specialty CSS

## 2021-03-17 RX ORDER — FLUOCINONIDE 0.5 MG/G
CREAM TOPICAL 2 TIMES DAILY
Qty: 30 G | Refills: 0 | Status: SHIPPED | OUTPATIENT
Start: 2021-03-17 | End: 2022-09-20

## 2021-03-17 NOTE — TELEPHONE ENCOUNTER
> 1 yr since seen by Derm. Needs appointment. Letter sent and note sent to pharmacy as well. Daksha Em RN

## 2021-04-03 ENCOUNTER — HEALTH MAINTENANCE LETTER (OUTPATIENT)
Age: 61
End: 2021-04-03

## 2021-04-05 ENCOUNTER — OFFICE VISIT (OUTPATIENT)
Dept: DERMATOLOGY | Facility: CLINIC | Age: 61
End: 2021-04-05
Payer: COMMERCIAL

## 2021-04-05 VITALS — OXYGEN SATURATION: 96 % | SYSTOLIC BLOOD PRESSURE: 122 MMHG | DIASTOLIC BLOOD PRESSURE: 79 MMHG | HEART RATE: 74 BPM

## 2021-04-05 DIAGNOSIS — L20.81 ATOPIC NEURODERMATITIS: Primary | ICD-10-CM

## 2021-04-05 PROCEDURE — 99213 OFFICE O/P EST LOW 20 MIN: CPT | Performed by: DERMATOLOGY

## 2021-04-05 RX ORDER — BETAMETHASONE DIPROPIONATE 0.5 MG/G
CREAM TOPICAL
Qty: 100 G | Refills: 3 | Status: SHIPPED | OUTPATIENT
Start: 2021-04-05 | End: 2022-09-20

## 2021-04-05 NOTE — NURSING NOTE
Chief Complaint   Patient presents with     Eczema       Vitals:    04/05/21 1437   BP: 122/79   Pulse: 74   SpO2: 96%     Wt Readings from Last 1 Encounters:   11/21/19 62.3 kg (137 lb 6.4 oz)       Archana Taylor LPN.................4/5/2021

## 2021-04-05 NOTE — PROGRESS NOTES
Ayala Wilson is an extremely pleasant 61 year old year old female patient here today forf/uatopic derm.  .  Patient states this has been present for  A while. Failed dupixient, cant afford nbuvb, failed cell cept, imuran and methotrexate.  She notes her hands are breaking out.  Patient reports the following modifying factors none.  Associated symptoms: itching.  Patient has no other skin complaints today.  Remainder of the HPI, Meds, PMH, Allergies, FH, and SH was reviewed in chart.      Past Medical History:   Diagnosis Date     Contact dermatitis and other eczema, due to unspecified cause      Tobacco use disorder        Past Surgical History:   Procedure Laterality Date     SURGICAL HISTORY OF -   1974    appendectomy        Family History   Problem Relation Age of Onset     Cerebrovascular Disease Mother         CVA     Cancer Father         bone     Gastrointestinal Disease Daughter         crohn's       Social History     Socioeconomic History     Marital status:      Spouse name: Not on file     Number of children: Not on file     Years of education: Not on file     Highest education level: Not on file   Occupational History     Occupation:      Employer: [employer]     Comment: 18+ years (in 2018)   Social Needs     Financial resource strain: Not on file     Food insecurity     Worry: Not on file     Inability: Not on file     Transportation needs     Medical: Not on file     Non-medical: Not on file   Tobacco Use     Smoking status: Current Some Day Smoker     Packs/day: 1.00     Years: 30.00     Pack years: 30.00     Types: Cigarettes     Smokeless tobacco: Never Used   Substance and Sexual Activity     Alcohol use: No     Drug use: No     Sexual activity: Yes     Partners: Male   Lifestyle     Physical activity     Days per week: Not on file     Minutes per session: Not on file     Stress: Not on file   Relationships     Social connections     Talks on phone: Not on file      Gets together: Not on file     Attends Jainism service: Not on file     Active member of club or organization: Not on file     Attends meetings of clubs or organizations: Not on file     Relationship status:      Intimate partner violence     Fear of current or ex partner: Not on file     Emotionally abused: Not on file     Physically abused: Not on file     Forced sexual activity: Not on file   Other Topics Concern     Parent/sibling w/ CABG, MI or angioplasty before 65F 55M? No   Social History Narrative    01/14/19    Patient lives alone.       Outpatient Encounter Medications as of 4/5/2021   Medication Sig Dispense Refill     fluocinonide (LIDEX) 0.05 % external cream Apply topically 2 times daily To affected areas 30 g 0     azaTHIOprine (IMURAN) 50 MG tablet Take 1 tablet (50 mg) by mouth daily (Patient not taking: Reported on 2/5/2020) 60 tablet 1     CELLCEPT (BRAND) 500 MG tablet Take 2 tablets (1,000 mg) by mouth 2 times daily (Patient not taking: Reported on 2/5/2020) 180 tablet 0     hydrOXYzine (ATARAX) 25 MG tablet Take 1 tablet (25 mg) by mouth two times daily (Patient not taking: Reported on 10/8/2019) 60 tablet 3     levETIRAcetam (KEPPRA) 500 MG tablet Denied per pt=   Pt is off medication (Patient not taking: Reported on 4/5/2021) 0.0001 tablet 0     LEVETIRACETAM PO Take 1,000 mg by mouth 2 times daily       No facility-administered encounter medications on file as of 4/5/2021.              Review Of Systems  Skin: As above  Eyes: negative  Ears/Nose/Throat: negative  Respiratory: No shortness of breath, dyspnea on exertion, cough, or hemoptysis  Cardiovascular: negative  Gastrointestinal: negative  Genitourinary: negative  Musculoskeletal: negative  Neurologic: negative  Psychiatric: negative  Hematologic/Lymphatic/Immunologic: negative  Endocrine: negative      O:   NAD, WDWN, Alert & Oriented, Mood & Affect wnl, Vitals stable   Here today alone   /79   Pulse 74   LMP  04/03/2007   SpO2 96%    General appearance normal   Vitals stable   Alert, oriented and in no acute distress     Eczematous plaques on hands         The remainder of expanded problem focused exam was normal; the following areas were examined:  , conjunctiva/lids, face, neck, digits/nails, RUE, LUE.      Eyes: Conjunctivae/lids:Normal     ENT: Lips, buccal mucosa, tongue: normal    MSK:Normal    Cardiovascular: peripheral edema none    Pulm: Breathing Normal    Neuro/Psych: Orientation:Alert and Orientedx3 ; Mood/Affect:normal       A/P:  1. Hand dermatitis  It was a pleasure speaking to Ayala Wilson today.  Previous clinic  notes and pertinent laboratory tests were reviewed prior to Ayala Wilson's visit.  Increase to betamethasone at bedtime time with wet wraps   Skin care regimen reviewed with patient: Eliminate harsh soaps, i.e. Dial, zest, irsih spring; Mild soaps such as Cetaphil or Dove sensitive skin, avoid hot or cold showers, aggressive use of emollients including vanicream, cetaphil or cerave discussed with patient.    Return to clinic 2 weeks virtual

## 2021-04-19 ENCOUNTER — VIRTUAL VISIT (OUTPATIENT)
Dept: DERMATOLOGY | Facility: CLINIC | Age: 61
End: 2021-04-19
Payer: COMMERCIAL

## 2021-04-19 DIAGNOSIS — L20.84 INTRINSIC ATOPIC DERMATITIS: Primary | ICD-10-CM

## 2021-04-19 PROCEDURE — 99212 OFFICE O/P EST SF 10 MIN: CPT | Mod: TEL | Performed by: DERMATOLOGY

## 2021-04-19 NOTE — LETTER
"    4/19/2021         RE: Ayala Wilson  [address] [facility] [address]        Dear Colleague,    Thank you for referring your patient, Ayala Wilson, to the [facility]. Please see a copy of my visit note below.        Ayala Wilson is a 61 year old female who is being evaluated via a phone  visit.      The patient has been notified of following:     \"This phone  visit will be conducted via a call between you and your physician/provider. We have found that certain health care needs can be provided without the need for an in-person physical exam.  This service lets us provide the care you need with a video conversation.  If a prescription is necessary we can send it directly to your pharmacy.  If lab work is needed we can place an order for that and you can then stop by our lab to have the test done at a later time.    Phone visits are billed at different rates depending on your insurance coverage.  Please reach out to your insurance provider with any questions.    If during the course of the call the physician/provider feels a phone visit is not appropriate, you will not be charged for this service.\"    Patient has given verbal consent for phone visit? Yes    How would you like to obtain your AVS? MyChart  Ayala Wilson is an extremely pleasant 61 year old year old female patient here today forf/uatopic derm.  .  Patient states this has been present for  A while. Failed dupixient, cant afford nbuvb, failed cell cept, imuran and methotrexate.  LOV we started betamethasone with wet wraps.  HAnds have improved.  She is happy with [facility].  Patient has no other skin complaints today.  Remainder of the HPI, Meds, PMH, Allergies, FH, and SH was reviewed in chart.      Past Medical History:   Diagnosis Date     Contact dermatitis and other eczema, due to unspecified cause      Tobacco use disorder        Past Surgical History:   Procedure Laterality Date     SURGICAL " HISTORY OF -   1974    appendectomy        Family History   Problem Relation Age of Onset     Cerebrovascular Disease Mother         CVA     Cancer Father         bone     Gastrointestinal Disease Daughter         crohn's       Social History     Socioeconomic History     Marital status:      Spouse name: Not on file     Number of children: Not on file     Years of education: Not on file     Highest education level: Not on file   Occupational History     Occupation:      Employer: [employer]     Comment: 18+ years (in 2018)   Social Needs     Financial resource strain: Not on file     Food insecurity     Worry: Not on file     Inability: Not on file     Transportation needs     Medical: Not on file     Non-medical: Not on file   Tobacco Use     Smoking status: Current Some Day Smoker     Packs/day: 1.00     Years: 30.00     Pack years: 30.00     Types: Cigarettes     Smokeless tobacco: Never Used   Substance and Sexual Activity     Alcohol use: No     Drug use: No     Sexual activity: Yes     Partners: Male   Lifestyle     Physical activity     Days per week: Not on file     Minutes per session: Not on file     Stress: Not on file   Relationships     Social connections     Talks on phone: Not on file     Gets together: Not on file     Attends Confucianist service: Not on file     Active member of club or organization: Not on file     Attends meetings of clubs or organizations: Not on file     Relationship status:      Intimate partner violence     Fear of current or ex partner: Not on file     Emotionally abused: Not on file     Physically abused: Not on file     Forced sexual activity: Not on file   Other Topics Concern     Parent/sibling w/ CABG, MI or angioplasty before 65F 55M? No   Social History Narrative    01/14/19    Patient lives alone.       Outpatient Encounter Medications as of 4/19/2021   Medication Sig Dispense Refill     augmented betamethasone dipropionate  (DIPROLENE-AF) 0.05 % external cream Apply sparingly to affected area twice daily as needed.  Do not apply to face. 100 g 3     azaTHIOprine (IMURAN) 50 MG tablet Take 1 tablet (50 mg) by mouth daily (Patient not taking: Reported on 2/5/2020) 60 tablet 1     CELLCEPT (BRAND) 500 MG tablet Take 2 tablets (1,000 mg) by mouth 2 times daily (Patient not taking: Reported on 2/5/2020) 180 tablet 0     fluocinonide (LIDEX) 0.05 % external cream Apply topically 2 times daily To affected areas 30 g 0     hydrOXYzine (ATARAX) 25 MG tablet Take 1 tablet (25 mg) by mouth two times daily (Patient not taking: Reported on 10/8/2019) 60 tablet 3     levETIRAcetam (KEPPRA) 500 MG tablet Denied per pt=   Pt is off medication (Patient not taking: Reported on 4/5/2021) 0.0001 tablet 0     LEVETIRACETAM PO Take 1,000 mg by mouth 2 times daily       No facility-administered encounter medications on file as of 4/19/2021.              Review Of Systems  Skin: As above  Eyes: negative  Ears/Nose/Throat: negative  Respiratory: No shortness of breath, dyspnea on exertion, cough, or hemoptysis  Cardiovascular: negative  Gastrointestinal: negative  Genitourinary: negative  Musculoskeletal: negative  Neurologic: negative  Psychiatric: negative  Hematologic/Lymphatic/Immunologic: negative  Endocrine: negative      O:   Alert & Orientedx3, Mood & Affect wnl,    General appearance normal   Alert, oriented and in no acute distress    Hands clearing per patient    Pulm: Breathing Normal, talking in normal sentences, no shortness of breath during conversation    Neuro/Psych: Orientation:Alert and Orientedx3 ; Mood/Affect:normal ; no anxiety or depression     A/P:  1.atopic dermatiits  She is happy with topicals for now  Cont current plan  It was a pleasure speaking to Ayala Wilson today.  Previous clinic  notes and pertinent laboratory tests were reviewed prior to Ayala Wilson's visit.  Skin care regimen reviewed with patient: Yanna  harsh soaps, i.e. Dial, zest, irsih spring; Mild soaps such as Cetaphil or Dove sensitive skin, avoid hot or cold showers, aggressive use of emollients including vanicream, cetaphil or cerave discussed with patient.    Return to clinic 3 months    Teledermatology information:  - Location of patient: home  - Location of teledermatologist: [facility]   - Reason teledermatology is appropriate: of National Emergency Regarding Coronavirus disease (COVID 19) Outbreak  - The patient's condition can safely be assessed using telemedicine: yes  - Method of transmission: store and forward teledermatology  - In-person dermatology visit recommendation: no  - Service start time:850am/pm  - Service end time:901am/pm  - Date of report: 04/19/21        Again, thank you for allowing me to participate in the care of your patient.        Sincerely,        Vijay Cox MD

## 2021-04-19 NOTE — PROGRESS NOTES
"    Ayala Wilson is a 61 year old female who is being evaluated via a phone  visit.      The patient has been notified of following:     \"This phone  visit will be conducted via a call between you and your physician/provider. We have found that certain health care needs can be provided without the need for an in-person physical exam.  This service lets us provide the care you need with a video conversation.  If a prescription is necessary we can send it directly to your pharmacy.  If lab work is needed we can place an order for that and you can then stop by our lab to have the test done at a later time.    Phone visits are billed at different rates depending on your insurance coverage.  Please reach out to your insurance provider with any questions.    If during the course of the call the physician/provider feels a phone visit is not appropriate, you will not be charged for this service.\"    Patient has given verbal consent for phone visit? Yes    How would you like to obtain your AVS? MyChart  Ayala Wilson is an extremely pleasant 61 year old year old female patient here today forf/uatopic derm.  .  Patient states this has been present for  A while. Failed dupixient, cant afford nbuvb, failed cell cept, imuran and methotrexate.  LOV we started betamethasone with wet wraps.  HAnds have improved.  She is happy with [facility].  Patient has no other skin complaints today.  Remainder of the HPI, Meds, PMH, Allergies, FH, and SH was reviewed in chart.      Past Medical History:   Diagnosis Date     Contact dermatitis and other eczema, due to unspecified cause      Tobacco use disorder        Past Surgical History:   Procedure Laterality Date     SURGICAL HISTORY OF -   1974    appendectomy        Family History   Problem Relation Age of Onset     Cerebrovascular Disease Mother         CVA     Cancer Father         bone     Gastrointestinal Disease Daughter         crohn's       Social History     Socioeconomic " History     Marital status:      Spouse name: Not on file     Number of children: Not on file     Years of education: Not on file     Highest education level: Not on file   Occupational History     Occupation:      Employer: [employer]     Comment: 18+ years (in 2018)   Social Needs     Financial resource strain: Not on file     Food insecurity     Worry: Not on file     Inability: Not on file     Transportation needs     Medical: Not on file     Non-medical: Not on file   Tobacco Use     Smoking status: Current Some Day Smoker     Packs/day: 1.00     Years: 30.00     Pack years: 30.00     Types: Cigarettes     Smokeless tobacco: Never Used   Substance and Sexual Activity     Alcohol use: No     Drug use: No     Sexual activity: Yes     Partners: Male   Lifestyle     Physical activity     Days per week: Not on file     Minutes per session: Not on file     Stress: Not on file   Relationships     Social connections     Talks on phone: Not on file     Gets together: Not on file     Attends Nondenominational service: Not on file     Active member of club or organization: Not on file     Attends meetings of clubs or organizations: Not on file     Relationship status:      Intimate partner violence     Fear of current or ex partner: Not on file     Emotionally abused: Not on file     Physically abused: Not on file     Forced sexual activity: Not on file   Other Topics Concern     Parent/sibling w/ CABG, MI or angioplasty before 65F 55M? No   Social History Narrative    01/14/19    Patient lives alone.       Outpatient Encounter Medications as of 4/19/2021   Medication Sig Dispense Refill     augmented betamethasone dipropionate (DIPROLENE-AF) 0.05 % external cream Apply sparingly to affected area twice daily as needed.  Do not apply to face. 100 g 3     azaTHIOprine (IMURAN) 50 MG tablet Take 1 tablet (50 mg) by mouth daily (Patient not taking: Reported on 2/5/2020) 60 tablet 1     CELLCEPT  (BRAND) 500 MG tablet Take 2 tablets (1,000 mg) by mouth 2 times daily (Patient not taking: Reported on 2/5/2020) 180 tablet 0     fluocinonide (LIDEX) 0.05 % external cream Apply topically 2 times daily To affected areas 30 g 0     hydrOXYzine (ATARAX) 25 MG tablet Take 1 tablet (25 mg) by mouth two times daily (Patient not taking: Reported on 10/8/2019) 60 tablet 3     levETIRAcetam (KEPPRA) 500 MG tablet Denied per pt=   Pt is off medication (Patient not taking: Reported on 4/5/2021) 0.0001 tablet 0     LEVETIRACETAM PO Take 1,000 mg by mouth 2 times daily       No facility-administered encounter medications on file as of 4/19/2021.              Review Of Systems  Skin: As above  Eyes: negative  Ears/Nose/Throat: negative  Respiratory: No shortness of breath, dyspnea on exertion, cough, or hemoptysis  Cardiovascular: negative  Gastrointestinal: negative  Genitourinary: negative  Musculoskeletal: negative  Neurologic: negative  Psychiatric: negative  Hematologic/Lymphatic/Immunologic: negative  Endocrine: negative      O:   Alert & Orientedx3, Mood & Affect wnl,    General appearance normal   Alert, oriented and in no acute distress    Hands clearing per patient    Pulm: Breathing Normal, talking in normal sentences, no shortness of breath during conversation    Neuro/Psych: Orientation:Alert and Orientedx3 ; Mood/Affect:normal ; no anxiety or depression     A/P:  1.atopic dermatiits  She is happy with topicals for now  Cont current plan  It was a pleasure speaking to Ayala Wilson today.  Previous clinic  notes and pertinent laboratory tests were reviewed prior to Ayala Wilson's visit.  Skin care regimen reviewed with patient: Eliminate harsh soaps, i.e. Dial, zest, irsih spring; Mild soaps such as Cetaphil or Dove sensitive skin, avoid hot or cold showers, aggressive use of emollients including vanicream, cetaphil or cerave discussed with patient.    Return to clinic 3 months    Teledermatology  information:  - Location of patient: home  - Location of teledermatologist: [facility]   - Reason teledermatology is appropriate: of National Emergency Regarding Coronavirus disease (COVID 19) Outbreak  - The patient's condition can safely be assessed using telemedicine: yes  - Method of transmission: store and forward teledermatology  - In-person dermatology visit recommendation: no  - Service start time:850am/pm  - Service end time:901am/pm  - Date of report: 04/19/21

## 2021-05-30 VITALS — HEIGHT: 67 IN | WEIGHT: 147.56 LBS | BODY MASS INDEX: 23.16 KG/M2

## 2021-06-08 NOTE — PROGRESS NOTES
"Assessment / Impression     1. Health care maintenance  Ambulatory referral for Colonoscopy    Mammo Screening Bilateral   2. Pustular psoriasis  Ambulatory referral to Dermatology   3. Eczema  Ambulatory referral to Dermatology   4. Seizure disorder  Ambulatory referral to Neurology       I have counseled the patient for tobacco cessation and the follow up will occur  at the next visit.    Plan:     She was given referrals to continue seeing her dermatologist and neurologist.  No medication changes were made today.  She had a normal Keppra level last November.  I ordered a screening colonoscopy and mammogram.  I recommended she return to the clinic for a general physical exam.  We discussed smoking cessation and I encouraged her to quit.    Subjective:      HPI: Ayala Wilson is a 56 y.o. female who presents to the clinic to establish care.  She has a history of eczema and was recently diagnosed with pustular psoriasis on her hands.  She has been seeing a dermatologist for this and is currently on cephalexin and Taclonex ointment.  She also describes having a seizure disorder that was diagnosed in 2013.  She is unaware of ever actually having a seizure, but her neurologist told her that there was evidence of seizure activity when they did an EEG.  She is currently on Keppra.  She had a normal Keppra level on 11/3/16.  She smokes a half pack-one pack of cigarettes daily.  She reports that she would be interested in quitting at some point in the future.  She thinks her last mammogram was about 4 years ago.        Review of Systems  All other systems reviewed and are negative.     History   Smoking Status     Current Every Day Smoker   Smokeless Tobacco     Not on file       No family history on file.    Objective:     Visit Vitals     /80 (Patient Site: Left Arm, Patient Position: Sitting, Cuff Size: Adult Regular)     Pulse 76     Temp 98.4  F (36.9  C) (Oral)     Resp 20     Ht 5' 7.3\" (1.709 m)     Wt " 147 lb 9 oz (66.9 kg)     Breastfeeding No     BMI 22.91 kg/m2     Physical Examination: General appearance - alert, well appearing, and in no distress  Eyes: pupils equal and reactive, extraocular eye movements intact  Mouth: mucous membranes moist, pharynx normal without lesions  Neck: supple, no significant adenopathy  Lungs: clear to auscultation, no wheezes, rales or rhonchi, symmetric air entry  Heart: normal rate, regular rhythm, normal S1, S2, no murmurs, rubs, clicks or gallops  Neurological: alert, oriented, normal speech, no focal findings or movement disorder noted.    Extremities: No edema, no clubbing or cyanosis  Psychiatric: Normal affect. Does not appear anxious or depressed.  Skin: Her palms are erythematous, dry, with peeling skin and a few pustules.  No results found for this or any previous visit (from the past 168 hour(s)).    Current Outpatient Prescriptions   Medication Sig Note     calcipotriene-betamethasone (TACLONEX) ointment 2 (two) times a day. 1/10/2017: Received from: External Pharmacy Received Sig:      cephalexin (KEFLEX) 500 MG capsule daily. 1/10/2017: Received from: External Pharmacy Received Sig:      levETIRAcetam (KEPPRA) 1000 MG tablet 2 (two) times a day. 1/10/2017: Received from: External Pharmacy Received Sig: TAKE 1 TABLET BY MOUTH TWICE A DAY

## 2021-07-13 ENCOUNTER — ALLIED HEALTH/NURSE VISIT (OUTPATIENT)
Dept: FAMILY MEDICINE | Facility: CLINIC | Age: 61
End: 2021-07-13
Payer: COMMERCIAL

## 2021-07-13 ENCOUNTER — OFFICE VISIT (OUTPATIENT)
Dept: FAMILY MEDICINE | Facility: CLINIC | Age: 61
End: 2021-07-13
Payer: COMMERCIAL

## 2021-07-13 VITALS
SYSTOLIC BLOOD PRESSURE: 130 MMHG | HEART RATE: 73 BPM | OXYGEN SATURATION: 95 % | TEMPERATURE: 97.8 F | HEIGHT: 68 IN | BODY MASS INDEX: 25.46 KG/M2 | DIASTOLIC BLOOD PRESSURE: 80 MMHG | WEIGHT: 168 LBS | RESPIRATION RATE: 14 BRPM

## 2021-07-13 DIAGNOSIS — Z23 ENCOUNTER FOR IMMUNIZATION: Primary | ICD-10-CM

## 2021-07-13 DIAGNOSIS — Z12.11 COLON CANCER SCREENING: ICD-10-CM

## 2021-07-13 DIAGNOSIS — Z13.1 SCREENING FOR DIABETES MELLITUS: ICD-10-CM

## 2021-07-13 DIAGNOSIS — Z13.29 SCREENING FOR THYROID DISORDER: ICD-10-CM

## 2021-07-13 DIAGNOSIS — Z13.220 SCREENING FOR HYPERLIPIDEMIA: ICD-10-CM

## 2021-07-13 DIAGNOSIS — Z00.00 ROUTINE GENERAL MEDICAL EXAMINATION AT A HEALTH CARE FACILITY: Primary | ICD-10-CM

## 2021-07-13 DIAGNOSIS — Z12.4 SCREENING FOR CERVICAL CANCER: ICD-10-CM

## 2021-07-13 DIAGNOSIS — Z87.891 PERSONAL HISTORY OF TOBACCO USE: ICD-10-CM

## 2021-07-13 PROBLEM — G40.909 SEIZURE DISORDER (H): Status: RESOLVED | Noted: 2017-01-10 | Resolved: 2021-07-13

## 2021-07-13 LAB
CHOLEST SERPL-MCNC: 195 MG/DL
FASTING STATUS PATIENT QL REPORTED: YES
FASTING STATUS PATIENT QL REPORTED: YES
GLUCOSE BLD-MCNC: 83 MG/DL (ref 70–99)
HDLC SERPL-MCNC: 62 MG/DL
LDLC SERPL CALC-MCNC: 100 MG/DL
NONHDLC SERPL-MCNC: 133 MG/DL
TRIGL SERPL-MCNC: 164 MG/DL
TSH SERPL DL<=0.005 MIU/L-ACNC: 1.22 MU/L (ref 0.4–4)

## 2021-07-13 PROCEDURE — 90471 IMMUNIZATION ADMIN: CPT

## 2021-07-13 PROCEDURE — G0124 SCREEN C/V THIN LAYER BY MD: HCPCS | Performed by: PATHOLOGY

## 2021-07-13 PROCEDURE — G0145 SCR C/V CYTO,THINLAYER,RESCR: HCPCS | Performed by: NURSE PRACTITIONER

## 2021-07-13 PROCEDURE — 36415 COLL VENOUS BLD VENIPUNCTURE: CPT | Performed by: NURSE PRACTITIONER

## 2021-07-13 PROCEDURE — 87624 HPV HI-RISK TYP POOLED RSLT: CPT | Performed by: NURSE PRACTITIONER

## 2021-07-13 PROCEDURE — 82947 ASSAY GLUCOSE BLOOD QUANT: CPT | Performed by: NURSE PRACTITIONER

## 2021-07-13 PROCEDURE — G0296 VISIT TO DETERM LDCT ELIG: HCPCS | Mod: 59 | Performed by: NURSE PRACTITIONER

## 2021-07-13 PROCEDURE — 99396 PREV VISIT EST AGE 40-64: CPT | Mod: 25 | Performed by: NURSE PRACTITIONER

## 2021-07-13 PROCEDURE — 84443 ASSAY THYROID STIM HORMONE: CPT | Performed by: NURSE PRACTITIONER

## 2021-07-13 PROCEDURE — 90750 HZV VACC RECOMBINANT IM: CPT

## 2021-07-13 PROCEDURE — 80061 LIPID PANEL: CPT | Performed by: NURSE PRACTITIONER

## 2021-07-13 PROCEDURE — 99207 PR NO CHARGE NURSE ONLY: CPT

## 2021-07-13 ASSESSMENT — ENCOUNTER SYMPTOMS
MYALGIAS: 0
HEADACHES: 0
NERVOUS/ANXIOUS: 1
HEMATURIA: 0
HEMATOCHEZIA: 0
EYE PAIN: 0
FREQUENCY: 0
COUGH: 0
WEAKNESS: 0
DYSURIA: 0
DIZZINESS: 1
DIARRHEA: 0
ARTHRALGIAS: 0
BREAST MASS: 0
PARESTHESIAS: 1
PALPITATIONS: 0
CONSTIPATION: 1
HEARTBURN: 0
JOINT SWELLING: 0
ABDOMINAL PAIN: 0
CHILLS: 0
SHORTNESS OF BREATH: 0
SORE THROAT: 0
FEVER: 0
NAUSEA: 0

## 2021-07-13 ASSESSMENT — PAIN SCALES - GENERAL: PAINLEVEL: NO PAIN (0)

## 2021-07-13 ASSESSMENT — MIFFLIN-ST. JEOR: SCORE: 1357.6

## 2021-07-13 NOTE — PROGRESS NOTES
SUBJECTIVE:   CC: Ayala Wilson is an 61 year old woman who presents for preventive health visit.       Patient has been advised of split billing requirements and indicates understanding: Yes   Answers for HPI/ROS submitted by the patient on 7/13/2021  Frequency of exercise:: 2-3 days/week  Getting at least 3 servings of Calcium per day:: Yes  Diet:: Regular (no restrictions)  Taking medications regularly:: Yes  Medication side effects:: Other  Bi-annual eye exam:: NO  Dental care twice a year:: Yes  Sleep apnea or symptoms of sleep apnea:: None  abdominal pain: No  Blood in stool: No  Blood in urine: No  chest pain: No  chills: No  congestion: No  constipation: Yes  cough: No  diarrhea: No  dizziness: Yes  ear pain: No  eye pain: No  nervous/anxious: Yes  fever: No  frequency: No  genital sores: No  headaches: No  hearing loss: Yes  heartburn: No  arthralgias: No  joint swelling: No  peripheral edema: No  mood changes: No  myalgias: No  nausea: No  dysuria: No  palpitations: No  Skin sensation changes: Yes  sore throat: No  urgency: No  rash: Yes  shortness of breath: No  visual disturbance: No  weakness: No  pelvic pain: No  vaginal bleeding: No  vaginal discharge: No  tenderness: No  breast mass: No  breast discharge: No  Additional concerns today:: No  Duration of exercise:: 15-30 minutes      Today's PHQ-2 Score:   PHQ-2 ( 1999 Pf[employer]) 7/13/2021 7/13/2021   Q1: Little interest or pleasure in doing things 0 0   Q2: Feeling down, depressed or hopeless 1 1   PHQ-2 Score 1 1   Q1: Little interest or pleasure in doing things Not at all -   Q2: Feeling down, depressed or hopeless Several days -   PHQ-2 Score 1 -       Abuse: Current or Past(Physical, Sexual or Emotional)- No  Do you feel safe in your environment? Yes    Have you ever done Advance Care Planning? (For example, a Health Directive, POLST, or a discussion with a medical provider or your loved ones about your wishes): No, advance care planning  information given to patient to review.  Advanced care planning was discussed at today's visit.    Social History     Tobacco Use     Smoking status: Former Smoker     Packs/day: 1.00     Years: 30.00     Pack years: 30.00     Types: Cigarettes     Smokeless tobacco: Never Used     Tobacco comment: pt quit smoking January 5th 2021   Substance Use Topics     Alcohol use: No     If you drink alcohol do you typically have >3 drinks per day or >7 drinks per week? No                     Reviewed orders with patient.  Reviewed health maintenance and updated orders accordingly - Yes  BP Readings from Last 3 Encounters:   07/13/21 130/80   04/05/21 122/79   02/05/20 112/82    Wt Readings from Last 3 Encounters:   07/13/21 76.2 kg (168 lb)   11/21/19 62.3 kg (137 lb 6.4 oz)   01/14/19 60.4 kg (133 lb 3.2 oz)                  Patient Active Problem List   Diagnosis     Sensorineural hearing loss     Tobacco use disorder     CARDIOVASCULAR SCREENING; LDL GOAL LESS THAN 160     Sinus disorder     Aura     Dizziness     GERD (gastroesophageal reflux disease)     Pustular psoriasis     Past Surgical History:   Procedure Laterality Date     SURGICAL HISTORY OF -   1974    appendectomy       Social History     Tobacco Use     Smoking status: Former Smoker     Packs/day: 1.00     Years: 30.00     Pack years: 30.00     Types: Cigarettes     Smokeless tobacco: Never Used     Tobacco comment: pt quit smoking January 5th 2021   Substance Use Topics     Alcohol use: No     Family History   Problem Relation Age of Onset     Cerebrovascular Disease Mother         CVA     Cancer Father         bone     Gastrointestinal Disease Daughter         crohn's         Current Outpatient Medications   Medication Sig Dispense Refill     augmented betamethasone dipropionate (DIPROLENE-AF) 0.05 % external cream Apply sparingly to affected area twice daily as needed.  Do not apply to face. (Patient not taking: Reported on 7/13/2021) 100 g 3      azaTHIOprine (IMURAN) 50 MG tablet Take 1 tablet (50 mg) by mouth daily (Patient not taking: Reported on 2/5/2020) 60 tablet 1     CELLCEPT (BRAND) 500 MG tablet Take 2 tablets (1,000 mg) by mouth 2 times daily (Patient not taking: Reported on 2/5/2020) 180 tablet 0     fluocinonide (LIDEX) 0.05 % external cream Apply topically 2 times daily To affected areas (Patient not taking: Reported on 7/13/2021) 30 g 0     hydrOXYzine (ATARAX) 25 MG tablet Take 1 tablet (25 mg) by mouth two times daily (Patient not taking: Reported on 10/8/2019) 60 tablet 3     levETIRAcetam (KEPPRA) 500 MG tablet Denied per pt=   Pt is off medication (Patient not taking: Reported on 4/5/2021) 0.0001 tablet 0     LEVETIRACETAM PO Take 1,000 mg by mouth 2 times daily (Patient not taking: Reported on 7/13/2021)       Allergies   Allergen Reactions     Dupixent [Dupilumab] Hives, Itching and Swelling     Humira Hives, Itching and Swelling     Zyban [Bupropion Hcl] Rash       FHS-7: No flowsheet data found.    Mammogram Screening: Recommended mammography every 1-2 years with patient discussion and risk factor consideration  Pertinent mammograms are reviewed under the imaging tab.    Pertinent mammograms are reviewed under the imaging tab.  History of abnormal Pap smear: NO - age 30-65 PAP every 5 years with negative HPV co-testing recommended  PAP / HPV 2/8/2012 1/21/2011 12/19/2008   PAP NIL NIL NIL     Reviewed and updated as needed this visit by clinical staff  Tobacco  Allergies  Meds  Problems  Med Hx  Surg Hx  Fam Hx  Soc Hx          Reviewed and updated as needed this visit by Provider  Tobacco  Allergies  Meds  Problems  Med Hx  Surg Hx  Fam Hx         Past Medical History:   Diagnosis Date     Contact dermatitis and other eczema, due to unspecified cause      Tobacco use disorder       Past Surgical History:   Procedure Laterality Date     SURGICAL HISTORY OF -   1974    appendectomy       ROS:  CONSTITUTIONAL: NEGATIVE for  "fever, chills, change in weight  INTEGUMENTARY/SKIN: NEGATIVE for worrisome rashes, moles or lesions  EYES: NEGATIVE for vision changes or irritation  ENT: NEGATIVE for ear, mouth and throat problems  RESP: NEGATIVE for significant cough or SOB  BREAST: NEGATIVE for masses, tenderness or discharge  CV: NEGATIVE for chest pain, palpitations or peripheral edema  GI: NEGATIVE for nausea, abdominal pain, heartburn, or change in bowel habits  : NEGATIVE for unusual urinary or vaginal symptoms. No vaginal bleeding.  MUSCULOSKELETAL: NEGATIVE for significant arthralgias or myalgia  NEURO: NEGATIVE for weakness, dizziness or paresthesias  PSYCHIATRIC: NEGATIVE for changes in mood or affect     OBJECTIVE:   /80 (BP Location: Right arm, Patient Position: Chair, Cuff Size: Adult Large)   Pulse 73   Temp 97.8  F (36.6  C) (Tympanic)   Resp 14   Ht 1.715 m (5' 7.5\")   Wt 76.2 kg (168 lb)   LMP 04/03/2007   SpO2 95%   Breastfeeding No   BMI 25.92 kg/m    EXAM:  GENERAL APPEARANCE: healthy, alert and no distress  EYES: Eyes grossly normal to inspection, PERRL and conjunctivae and sclerae normal  HENT: ear canals and TM's normal, nose and mouth without ulcers or lesions, oropharynx clear and oral mucous membranes moist  NECK: no adenopathy, no asymmetry, masses, or scars and thyroid normal to palpation  RESP: lungs clear to auscultation - no rales, rhonchi or wheezes  BREAST: normal without masses, tenderness or nipple discharge and no palpable axillary masses or adenopathy  CV: regular rate and rhythm, normal S1 S2, no S3 or S4, no murmur, click or rub, no peripheral edema and peripheral pulses strong  ABDOMEN: soft, nontender, no hepatosplenomegaly, no masses and bowel sounds normal   (female): normal female external genitalia, normal urethral meatus, vaginal mucosal atrophy noted, normal cervix, adnexae, and uterus without masses or abnormal discharge  MS: no musculoskeletal defects are noted and gait is age " "appropriate without ataxia  SKIN: no suspicious lesions or rashes  NEURO: Normal strength and tone, sensory exam grossly normal, mentation intact and speech normal  PSYCH: mentation appears normal and affect normal/bright        ASSESSMENT/PLAN:       ICD-10-CM    1. Routine general medical examination at a health care facility  Z00.00    2. Personal history of tobacco use  Z87.891 Prof Fee: Shared Decision Making Visit for Lung Cancer Screening     CT Chest Lung Cancer Scrn Low Dose wo   3. Colon cancer screening  Z12.11 Adult Gastro Ref - Procedure Only   4. Screening for cervical cancer  Z12.4 Pap imaged thin layer screen with HPV - recommended age 30 - 65 years (select HPV order below)     CANCELED: HPV High Risk Types DNA Cervical     CANCELED: Pap imaged thin layer screen with HPV - recommended age 30 - 65 years (select HPV order below)     CANCELED: HPV High Risk Types DNA Cervical   5. Screening for hyperlipidemia  Z13.220 Lipid panel reflex to direct LDL Fasting   6. Screening for diabetes mellitus  Z13.1 GLUCOSE   7. Screening for thyroid disorder  Z13.29 TSH with free T4 reflex     CANCELED: TSH with free T4 reflex     Healthy female exam completed today. Discussed lifestyle modifications including weight loss/ management, eating a balanced diet, and getting regular cardiovascular exercise. Discussed self breast exams and how to complete these. Pap Smear up dated today. Labs updated today. Plan yearly annual physicals or sooner as needed.    Patient has been advised of split billing requirements and indicates understanding: Yes  COUNSELING:   Reviewed preventive health counseling, as reflected in patient instructions       Regular exercise       Healthy diet/nutrition    Estimated body mass index is 25.92 kg/m  as calculated from the following:    Height as of this encounter: 1.715 m (5' 7.5\").    Weight as of this encounter: 76.2 kg (168 lb).        She reports that she has quit smoking. Her smoking use " included cigarettes. She has a 30.00 pack-year smoking history. She has never used smokeless tobacco.      Counseling Resources:  ATP IV Guidelines  Pooled Cohorts Equation Calculator  Breast Cancer Risk Calculator  BRCA-Related Cancer Risk Assessment: FHS-7 Tool  FRAX Risk Assessment  ICSI Preventive Guidelines  Dietary Guidelines for Americans, 2010  [employer]atrix [employer]'s MyPlate  ASA Prophylaxis  Lung CA Screening    Kaylene Drew, [facility] [place]  Lung Cancer Screening Shared Decision Making Visit     Ayala Wilson is eligible for lung cancer screening on the basis of the information provided in my signed lung cancer screening order.     I have discussed with patient the risks and benefits of screening for lung cancer with low-dose CT.     The risks include:  radiation exposure: one low dose chest CT has as much ionizing radiation as about 15 chest x-rays or 6 months of background radiation living in [place]    false positives: 96% of positive findings/nodules are NOT cancer, but some might still require additional diagnostic evaluation, including biopsy  over-diagnosis: some slow growing cancers that might never have been clinically significant will be detected and treated unnecessarily     The benefit of early detection of lung cancer is contingent upon adherence to annual screening or more frequent follow up if indicated.     Furthermore, reaping the benefits of screening requires Ayala Wilson to be willing and physically able to undergo diagnostic procedures, if indicated. Although no specific guide is available for determining severity of comorbidities, it is reasonable to withhold screening in patients who have greater mortality risk from other diseases.     We did discuss that the only way to prevent lung cancer is to not smoke. Smoking cessation counseling was not given.      I did offer risk estimation using a calculator such as this one:    ShouldIScreen

## 2021-07-13 NOTE — PROGRESS NOTES
Prior to immunization administration, verified patients identity using patient s name and date of birth. Please see Immunization Activity for additional information.     Screening Questionnaire for Adult Immunization    Are you sick today?   No   Do you have allergies to medications, food, a vaccine component or latex?   No   Have you ever had a serious reaction after receiving a vaccination?   No   Do you have a long-term health problem with heart, lung, kidney, or metabolic disease (e.g., diabetes), asthma, a blood disorder, no spleen, complement component deficiency, a cochlear implant, or a spinal fluid leak?  Are you on long-term aspirin therapy?   No   Do you have cancer, leukemia, HIV/AIDS, or any other immune system problem?   No   Do you have a parent, brother, or sister with an immune system problem?   No   In the past 3 months, have you taken medications that affect  your immune system, such as prednisone, other steroids, or anticancer drugs; drugs for the treatment of rheumatoid arthritis, Crohn s disease, or psoriasis; or have you had radiation treatments?   No   Have you had a seizure, or a brain or other nervous system problem?   No   During the past year, have you received a transfusion of blood or blood    products, or been given immune (gamma) globulin or antiviral drug?   No   For women: Are you pregnant or is there a chance you could become       pregnant during the next month?   No   Have you received any vaccinations in the past 4 weeks?   No     Immunization questionnaire answers were all negative.        Per orders of Kaylene Drew, injection of Shingrix given by Flor Crump. Patient instructed to remain in clinic for 15 minutes afterwards, and to report any adverse reaction to me immediately.       Screening performed by Flor Crump on 7/13/2021 at 11:19 AM.

## 2021-07-13 NOTE — PATIENT INSTRUCTIONS
Shingrix (shingles Vaccine. Call insurance to find out if covered/ cost)      Patient Education   Personalized Prevention Plan  You are due for the preventive services outlined below.  Your care team is available to assist you in scheduling these services.  If you have already completed any of these items, please share that information with your care team to update in your medical record.  Health Maintenance Due   Topic Date Due     ANNUAL REVIEW OF HM ORDERS  Never done     Discuss Advance Care Planning  Never done     Pneumococcal Vaccine (1 of 2 - PPSV23) Never done     Zoster (Shingles) Vaccine (1 of 2) Never done     Preventive Care Visit  02/08/2013     PAP Smear  02/08/2015     Mammogram  01/19/2019     PHQ-2  01/01/2021     Colorectal Cancer Screening  07/25/2021        Preventive Health Recommendations  Female Ages 50 - 64    Yearly exam: See your health care provider every year in order to  o Review health changes.   o Discuss preventive care.    o Review your medicines if your doctor has prescribed any.      Get a Pap test every three years (unless you have an abnormal result and your provider advises testing more often).    If you get Pap tests with HPV test, you only need to test every 5 years, unless you have an abnormal result.     You do not need a Pap test if your uterus was removed (hysterectomy) and you have not had cancer.    You should be tested each year for STDs (sexually transmitted diseases) if you're at risk.     Have a mammogram every 1 to 2 years.    Have a colonoscopy at age 50, or have a yearly FIT test (stool test). These exams screen for colon cancer.      Have a cholesterol test every 5 years, or more often if advised.    Have a diabetes test (fasting glucose) every three years. If you are at risk for diabetes, you should have this test more often.     If you are at risk for osteoporosis (brittle bone disease), think about having a bone density scan (DEXA).    Shots: Get a flu shot  each year. Get a tetanus shot every 10 years.    Nutrition:     Eat at least 5 servings of fruits and vegetables each day.    Eat whole-grain bread, whole-wheat pasta and brown rice instead of white grains and rice.    Get adequate Calcium and Vitamin D.     Lifestyle    Exercise at least 150 minutes a week (30 minutes a day, 5 days a week). This will help you control your weight and prevent disease.    Limit alcohol to one drink per day.    No smoking.     Wear sunscreen to prevent skin cancer.     See your dentist every six months for an exam and cleaning.    See your eye doctor every 1 to 2 years.    Lung Cancer Screening   Frequently Asked Questions  If you are at high-risk for lung cancer, getting screened with low-dose computed tomography (LDCT) every year can help save your life. This handout offers answers to some of the most common questions about lung cancer screening. If you have other questions, please call [phone] ([phone]).     What is it?  Lung cancer screening uses special X-ray technology to create an image of your lung tissue. The exam is quick and easy and takes less than 10 seconds. We don t give you any medicine or use any needles. You can eat before and after the exam. You don t need to change your clothes as long as the clothing on your chest doesn t contain metal. But, you do need to be able to hold your breath for at least 6 seconds during the exam.    What is the goal of lung cancer screening?  The goal of lung cancer screening is to save lives. Many times, lung cancer is not found until a person starts having physical symptoms. Lung cancer screening can help detect lung cancer in the earliest stages when it may be easier to treat.    Who should be screened for lung cancer?  We suggest lung cancer screening for anyone who is at high-risk for lung cancer. You are in the high-risk group if you:      are between the ages of 55 and 79, and    have smoked at least 1 pack of  cigarettes a day for 30 or more years, and    still smoke or have quit within the past 15 years.    However, if you have a new cough or shortness of breath, you should talk to your doctor before being screened.    Some national lung health advocacy groups also recommend screening for people ages 50 to 79 who have smoked an average of 1 pack of cigarettes a day for 20 years. They must also have at least 1 other risk factor for lung cancer, not including exposure to secondhand smoke. Other risk factors are having had cancer in the past, emphysema, pulmonary fibrosis, COPD, a family history of lung cancer, or exposure to certain materials such as arsenic, asbestos, beryllium, cadmium, chromium, diesel fumes, nickel, radon or silica. Your care team can help you know if you have one of these risk factors.     Why does it matter if I have symptoms?  Certain symptoms can be a sign that you have a condition in your lungs that should be checked and treated by your doctor. These symptoms include fever, chest pain, a new or changing cough, shortness of breath that you have never felt before, coughing up blood or unexplained weight loss. Having any of these symptoms can greatly affect the results of lung cancer screening.       Should all smokers get an LDCT lung cancer screening exam?  It depends. Lung cancer screening is for a very specific group of men and women who have a history of heavy smoking over a long period of time (see  Who should be screened for lung cancer  above).  I am in the high-risk group, but have been diagnosed with cancer in the past. Is LDCT lung cancer screening right for me?  In some cases, you should not have LDCT lung screening, such as when your doctor is already following your cancer with CT scan studies. Your doctor will help you decide if LDCT lung screening is right for you.  Do I need to have a screening exam every year?  Yes. If you are in the high-risk group described earlier, you should get  an LDCT lung cancer screening exam every year until you are 79, or are no longer willing or able to undergo screening and possible procedures to diagnose and treat lung cancer.  How effective is LDCT at preventing death from lung cancer?  Studies have shown that LDCT lung cancer screening can lower the risk of death from lung cancer by 20 percent in people who are at high-risk.  What are the risks?  There are some risks and limitations of LDCT lung cancer screening. We want to make sure you understand the risks and benefits, so please let us know if you have any questions. Your doctor may want to talk with you more about these risks.    Radiation exposure: As with any exam that uses radiation, there is a very small increased risk of cancer. The amount of radiation in LDCT is small--about the same amount a person would get from a mammogram. Your doctor orders the exam when he or she feels the potential benefits outweigh the risks.    False negatives: No test is perfect, including LDCT. It is possible that you may have a medical condition, including lung cancer, that is not found during your exam. This is called a false negative result.    False positives and more testing: LDCT very often finds something in the lung that could be cancer, but in fact is not. This is called a false positive result. False positive tests often cause anxiety. To make sure these findings are not cancer, you may need to have more tests. These tests will be done only if you give us permission. Sometimes patients need a treatment that can have side effects, such as a biopsy. For more information on false positives, see  What can I expect from the results?     Findings not related to lung cancer: Your LDCT exam also takes pictures of areas of your body next to your lungs. In a very small number of cases, the CT scan will show an abnormal finding in one of these areas, such as your kidneys, adrenal glands, liver or thyroid. This finding may not  be serious, but you may need more tests. Your doctor can help you decide what other tests you may need, if any.  What can I expect from the results?  About 1 out of 4 LDCT exams will find something that may need more tests. Most of the time, these findings are lung nodules. Lung nodules are very small collections of tissue in the lung. These nodules are very common, and the vast majority--more than 97 percent--are not cancer (benign). Most are normal lymph nodes or small areas of scarring from past infections.  But, if a small lung nodule is found to be cancer, the cancer can be cured more than 90 percent of the time. To know if the nodule is cancer, we may need to get more images before your next yearly screening exam. If the nodule has suspicious features (for example, it is large, has an odd shape or grows over time), we will refer you to a specialist for further testing.  Will my doctor also get the results?  Yes. Your doctor will get a copy of your results.  Is it okay to keep smoking now that there s a cancer screening exam?  No. Tobacco is one of the strongest cancer-causing agents. It causes not only lung cancer, but other cancers and cardiovascular (heart) diseases as well. The damage caused by smoking builds over time. This means that the longer you smoke, the higher your risk of disease. While it is never too late to quit, the sooner you quit, the better.  Where can I find help to quit smoking?  The best way to prevent lung cancer is to stop smoking. If you have already quit smoking, congratulations and keep it up! For help on quitting smoking, please call [employer] at 8-113-408-MWAN ([phone]) or the [employer] at 1-433.687.5768 to find local resources near you.  One-on-one health coaching:  If you d prefer to work individually with a health care provider on tobacco cessation, we offer:      Medication Therapy Management:  Our specially trained pharmacists work closely with you and your doctor to  help you quit smoking.  Call [phone] or [phone] (toll free).     Can Do: Health coaching offered by [facility].  [email or web address]

## 2021-07-16 LAB
BKR LAB AP GYN ADEQUACY: ABNORMAL
BKR LAB AP GYN INTERPRETATION: ABNORMAL
BKR LAB AP HPV REFLEX: ABNORMAL
BKR LAB AP LMP: ABNORMAL
BKR LAB AP PREVIOUS ABNORMAL: ABNORMAL
PATH REPORT.COMMENTS IMP SPEC: ABNORMAL
PATH REPORT.RELEVANT HX SPEC: ABNORMAL

## 2021-07-19 ENCOUNTER — ANCILLARY PROCEDURE (OUTPATIENT)
Dept: MAMMOGRAPHY | Facility: CLINIC | Age: 61
End: 2021-07-19
Payer: COMMERCIAL

## 2021-07-19 DIAGNOSIS — Z12.31 VISIT FOR SCREENING MAMMOGRAM: ICD-10-CM

## 2021-07-19 PROCEDURE — 77067 SCR MAMMO BI INCL CAD: CPT | Mod: TC | Performed by: RADIOLOGY

## 2021-07-19 PROCEDURE — 77063 BREAST TOMOSYNTHESIS BI: CPT | Mod: TC | Performed by: RADIOLOGY

## 2021-07-20 LAB
HUMAN PAPILLOMA VIRUS 16 DNA: NEGATIVE
HUMAN PAPILLOMA VIRUS 18 DNA: NEGATIVE
HUMAN PAPILLOMA VIRUS FINAL DIAGNOSIS: NORMAL
HUMAN PAPILLOMA VIRUS OTHER HR: POSITIVE

## 2021-07-21 ENCOUNTER — PATIENT OUTREACH (OUTPATIENT)
Dept: FAMILY MEDICINE | Facility: CLINIC | Age: 61
End: 2021-07-21

## 2021-07-21 PROBLEM — R87.612 PAPANICOLAOU SMEAR OF CERVIX WITH LOW GRADE SQUAMOUS INTRAEPITHELIAL LESION (LGSIL): Status: ACTIVE | Noted: 2021-07-21

## 2021-08-02 DIAGNOSIS — Z11.59 ENCOUNTER FOR SCREENING FOR OTHER VIRAL DISEASES: ICD-10-CM

## 2021-08-16 ENCOUNTER — LAB (OUTPATIENT)
Dept: LAB | Facility: CLINIC | Age: 61
End: 2021-08-16
Payer: COMMERCIAL

## 2021-08-16 ENCOUNTER — ANESTHESIA EVENT (OUTPATIENT)
Dept: GASTROENTEROLOGY | Facility: CLINIC | Age: 61
End: 2021-08-16
Payer: COMMERCIAL

## 2021-08-16 DIAGNOSIS — Z11.59 ENCOUNTER FOR SCREENING FOR OTHER VIRAL DISEASES: ICD-10-CM

## 2021-08-16 PROCEDURE — U0003 INFECTIOUS AGENT DETECTION BY NUCLEIC ACID (DNA OR RNA); SEVERE ACUTE RESPIRATORY SYNDROME CORONAVIRUS 2 (SARS-COV-2) (CORONAVIRUS DISEASE [COVID-19]), AMPLIFIED PROBE TECHNIQUE, MAKING USE OF HIGH THROUGHPUT TECHNOLOGIES AS DESCRIBED BY CMS-2020-01-R: HCPCS

## 2021-08-16 PROCEDURE — U0005 INFEC AGEN DETEC AMPLI PROBE: HCPCS

## 2021-08-16 ASSESSMENT — LIFESTYLE VARIABLES: TOBACCO_USE: 1

## 2021-08-16 NOTE — ANESTHESIA PREPROCEDURE EVALUATION
Anesthesia Pre-Procedure Evaluation    Patient: Ayala Wilson   MRN: [id number] : 1960        Preoperative Diagnosis: Colon cancer screening [Z12.11]   Procedure : Procedure(s):  COLONOSCOPY     Past Medical History:   Diagnosis Date     Abnormal Pap smear of cervix 2021     Cervical high risk HPV (human papillomavirus) test positive 2021     Contact dermatitis and other eczema, due to unspecified cause      Tobacco use disorder       Past Surgical History:   Procedure Laterality Date     SURGICAL HISTORY OF -       appendectomy      Allergies   Allergen Reactions     Dupixent [Dupilumab] Hives, Itching and Swelling     Humira Hives, Itching and Swelling     Zyban [Bupropion Hcl] Rash      Social History     Tobacco Use     Smoking status: Former Smoker     Packs/day: 1.00     Years: 30.00     Pack years: 30.00     Types: Cigarettes     Smokeless tobacco: Never Used     Tobacco comment: pt quit smoking 2021   Substance Use Topics     Alcohol use: No      Wt Readings from Last 1 Encounters:   21 76.2 kg (168 lb)        Anesthesia Evaluation   Pt has had prior anesthetic. Type: General.    No history of anesthetic complications       ROS/MED HX  ENT/Pulmonary:     (+) tobacco use, Past use,     Neurologic: Comment: dizziness      Cardiovascular:  - neg cardiovascular ROS     METS/Exercise Tolerance: >4 METS    Hematologic:  - neg hematologic  ROS     Musculoskeletal:  - neg musculoskeletal ROS     GI/Hepatic:     (+) GERD,     Renal/Genitourinary:  - neg Renal ROS     Endo:  - neg endo ROS     Psychiatric/Substance Use:  - neg psychiatric ROS     Infectious Disease:  - neg infectious disease ROS     Malignancy:  - neg malignancy ROS     Other:  - neg other ROS          Physical Exam    Airway        Mallampati: II   TM distance: > 3 FB   Neck ROM: full   Mouth opening: > 3 cm    Respiratory Devices and Support         Dental       (+) lower dentures      Cardiovascular           Rhythm and rate: regular and normal     Pulmonary   pulmonary exam normal        breath sounds clear to auscultation           OUTSIDE LABS:  CBC:   Lab Results   Component Value Date    WBC 6.9 02/05/2020    WBC 7.6 12/09/2019    HGB 14.8 02/05/2020    HGB 14.0 12/09/2019    HCT 45.9 02/05/2020    HCT 42.5 12/09/2019     02/05/2020     12/09/2019     BMP:   Lab Results   Component Value Date     02/05/2020     12/09/2019    POTASSIUM 3.8 02/05/2020    POTASSIUM 4.0 12/09/2019    CHLORIDE 106 02/05/2020    CHLORIDE 105 12/09/2019    CO2 27 02/05/2020    CO2 30 12/09/2019    BUN 15 02/05/2020    BUN 13 12/09/2019    CR 0.69 02/05/2020    CR 0.76 12/09/2019    GLC 83 07/13/2021    GLC 82 02/05/2020     COAGS: No results found for: PTT, INR, FIBR  POC:   Lab Results   Component Value Date    HCG Negative 08/24/2005     HEPATIC:   Lab Results   Component Value Date    ALBUMIN 4.0 02/05/2020    PROTTOTAL 7.8 02/05/2020    ALT 26 02/05/2020    AST 19 02/05/2020    ALKPHOS 82 02/05/2020    BILITOTAL 0.4 02/05/2020     OTHER:   Lab Results   Component Value Date    A1C 5.4 02/08/2012    ROM 9.3 02/05/2020    TSH 1.22 07/13/2021    T4 1.30 02/08/2012    SED 3 11/21/2019       Anesthesia Plan    ASA Status:  2   NPO Status:  NPO Appropriate    Anesthesia Type: General.     - Airway: Native airway   Induction: Propofol.   Maintenance: TIVA.        Consents    Anesthesia Plan(s) and associated risks, benefits, and realistic alternatives discussed. Questions answered and patient/representative(s) expressed understanding.     - Discussed with:  Patient      - Extended Intubation/Ventilatory Support Discussed: No.      - Patient is DNR/DNI Status: No    Use of blood products discussed: No .     Postoperative Care    Pain management: Oral pain medications.   PONV prophylaxis: Ondansetron (or other 5HT-3)     Comments:                Julienne Nowak CRNA, APRN ARIANA

## 2021-08-17 LAB — SARS-COV-2 RNA RESP QL NAA+PROBE: NEGATIVE

## 2021-08-18 ENCOUNTER — ANESTHESIA (OUTPATIENT)
Dept: GASTROENTEROLOGY | Facility: CLINIC | Age: 61
End: 2021-08-18
Payer: COMMERCIAL

## 2021-08-18 ENCOUNTER — HOSPITAL ENCOUNTER (OUTPATIENT)
Facility: CLINIC | Age: 61
Discharge: HOME | End: 2021-08-18
Attending: SURGERY | Admitting: SURGERY
Payer: COMMERCIAL

## 2021-08-18 VITALS
DIASTOLIC BLOOD PRESSURE: 78 MMHG | HEART RATE: 70 BPM | WEIGHT: 168 LBS | RESPIRATION RATE: 16 BRPM | BODY MASS INDEX: 25.46 KG/M2 | OXYGEN SATURATION: 95 % | TEMPERATURE: 98.8 F | HEIGHT: 68 IN | SYSTOLIC BLOOD PRESSURE: 121 MMHG

## 2021-08-18 LAB — COLONOSCOPY: NORMAL

## 2021-08-18 PROCEDURE — 250N000009 HC RX 250: Performed by: NURSE ANESTHETIST, CERTIFIED REGISTERED

## 2021-08-18 PROCEDURE — 45378 DIAGNOSTIC COLONOSCOPY: CPT | Performed by: SURGERY

## 2021-08-18 PROCEDURE — G0121 COLON CA SCRN NOT HI RSK IND: HCPCS | Performed by: SURGERY

## 2021-08-18 PROCEDURE — 258N000003 HC RX IP 258 OP 636: Performed by: SURGERY

## 2021-08-18 PROCEDURE — 370N000017 HC ANESTHESIA TECHNICAL FEE, PER MIN: Performed by: SURGERY

## 2021-08-18 PROCEDURE — 250N000009 HC RX 250: Performed by: SURGERY

## 2021-08-18 PROCEDURE — 250N000011 HC RX IP 250 OP 636: Performed by: NURSE ANESTHETIST, CERTIFIED REGISTERED

## 2021-08-18 RX ORDER — FLUMAZENIL 0.1 MG/ML
0.2 INJECTION, SOLUTION INTRAVENOUS
Status: CANCELLED | OUTPATIENT
Start: 2021-08-18 | End: 2021-08-18

## 2021-08-18 RX ORDER — NALOXONE HYDROCHLORIDE 0.4 MG/ML
0.4 INJECTION, SOLUTION INTRAMUSCULAR; INTRAVENOUS; SUBCUTANEOUS
Status: CANCELLED | OUTPATIENT
Start: 2021-08-18

## 2021-08-18 RX ORDER — ONDANSETRON 2 MG/ML
4 INJECTION INTRAMUSCULAR; INTRAVENOUS
Status: DISCONTINUED | OUTPATIENT
Start: 2021-08-18 | End: 2021-08-18 | Stop reason: HOSPADM

## 2021-08-18 RX ORDER — LIDOCAINE HYDROCHLORIDE 10 MG/ML
INJECTION, SOLUTION INFILTRATION; PERINEURAL PRN
Status: DISCONTINUED | OUTPATIENT
Start: 2021-08-18 | End: 2021-08-18

## 2021-08-18 RX ORDER — SODIUM CHLORIDE, SODIUM LACTATE, POTASSIUM CHLORIDE, CALCIUM CHLORIDE 600; 310; 30; 20 MG/100ML; MG/100ML; MG/100ML; MG/100ML
INJECTION, SOLUTION INTRAVENOUS CONTINUOUS
Status: DISCONTINUED | OUTPATIENT
Start: 2021-08-18 | End: 2021-08-18 | Stop reason: HOSPADM

## 2021-08-18 RX ORDER — NALOXONE HYDROCHLORIDE 0.4 MG/ML
0.2 INJECTION, SOLUTION INTRAMUSCULAR; INTRAVENOUS; SUBCUTANEOUS
Status: CANCELLED | OUTPATIENT
Start: 2021-08-18

## 2021-08-18 RX ORDER — PROPOFOL 10 MG/ML
INJECTION, EMULSION INTRAVENOUS PRN
Status: DISCONTINUED | OUTPATIENT
Start: 2021-08-18 | End: 2021-08-18

## 2021-08-18 RX ORDER — LIDOCAINE 40 MG/G
CREAM TOPICAL
Status: DISCONTINUED | OUTPATIENT
Start: 2021-08-18 | End: 2021-08-18 | Stop reason: HOSPADM

## 2021-08-18 RX ORDER — PROPOFOL 10 MG/ML
INJECTION, EMULSION INTRAVENOUS CONTINUOUS PRN
Status: DISCONTINUED | OUTPATIENT
Start: 2021-08-18 | End: 2021-08-18

## 2021-08-18 RX ADMIN — PROPOFOL 200 MCG/KG/MIN: 10 INJECTION, EMULSION INTRAVENOUS at 09:21

## 2021-08-18 RX ADMIN — PROPOFOL 100 MG: 10 INJECTION, EMULSION INTRAVENOUS at 09:21

## 2021-08-18 RX ADMIN — LIDOCAINE HYDROCHLORIDE 40 MG: 10 INJECTION, SOLUTION INFILTRATION; PERINEURAL at 09:21

## 2021-08-18 RX ADMIN — LIDOCAINE HYDROCHLORIDE 0.1 ML: 10 INJECTION, SOLUTION EPIDURAL; INFILTRATION; INTRACAUDAL; PERINEURAL at 08:59

## 2021-08-18 RX ADMIN — SODIUM CHLORIDE, POTASSIUM CHLORIDE, SODIUM LACTATE AND CALCIUM CHLORIDE: 600; 310; 30; 20 INJECTION, SOLUTION INTRAVENOUS at 08:59

## 2021-08-18 ASSESSMENT — MIFFLIN-ST. JEOR: SCORE: 1357.6

## 2021-08-18 NOTE — ANESTHESIA POSTPROCEDURE EVALUATION
Patient: Ayala Wilson    Procedure(s):  COLONOSCOPY    Diagnosis:Colon cancer screening [Z12.11]  Diagnosis Additional Information: No value filed.    Anesthesia Type:  General    Note:  Disposition: Outpatient   Postop Pain Control: Uneventful            Sign Out: Well controlled pain   PONV: No   Neuro/Psych: Uneventful            Sign Out: Acceptable/Baseline neuro status   Airway/Respiratory: Uneventful            Sign Out: Acceptable/Baseline resp. status   CV/Hemodynamics: Uneventful            Sign Out: Acceptable CV status; No obvious hypovolemia; No obvious fluid overload   Other NRE: NONE   DID A NON-ROUTINE EVENT OCCUR? No           Last vitals:  Vitals Value Taken Time   /80 08/18/21 1000   Temp     Pulse 71 08/18/21 1000   Resp     SpO2 97 % 08/18/21 1008   Vitals shown include unvalidated device data.    Electronically Signed By: Junior Hall CRNA, APRN CRNA  August 18, 2021  10:12 AM

## 2021-08-18 NOTE — H&P
"61 year old year old female here for colonoscopy for screening.  This is patient's first colonoscopy.  Patient denies blood in stool or change in stool caliber.  There is no known family history of colon cancer or polyps.  Daughter has a history of Crohn's.  She has no chronic abdominal pain    Patient Active Problem List   Diagnosis     Sensorineural hearing loss     Tobacco use disorder     CARDIOVASCULAR SCREENING; LDL GOAL LESS THAN 160     Sinus disorder     Aura     Dizziness     GERD (gastroesophageal reflux disease)     Pustular psoriasis     Papanicolaou smear of cervix with low grade squamous intraepithelial lesion (LGSIL)       Past Medical History:   Diagnosis Date     Abnormal Pap smear of cervix 07/13/2021     Cervical high risk HPV (human papillomavirus) test positive 07/13/2021     Contact dermatitis and other eczema, due to unspecified cause      Tobacco use disorder        Past Surgical History:   Procedure Laterality Date     SURGICAL HISTORY OF -   1974    appendectomy       Family History   Problem Relation Age of Onset     Cerebrovascular Disease Mother         CVA     Cancer Father         bone     Gastrointestinal Disease Daughter         crohn's       No current outpatient medications on file.       Allergies   Allergen Reactions     Dupixent [Dupilumab] Hives, Itching and Swelling     Humira Hives, Itching and Swelling     Zyban [Bupropion Hcl] Rash       Pt reports that she has quit smoking. Her smoking use included cigarettes. She has a 30.00 pack-year smoking history. She has never used smokeless tobacco. She reports that she does not drink alcohol and does not use drugs.    Exam:  /85 (BP Location: Left arm)   Pulse 64   Temp 98.8  F (37.1  C) (Oral)   Resp 16   Ht 1.715 m (5' 7.5\")   Wt 76.2 kg (168 lb)   LMP 04/03/2007   SpO2 97%   BMI 25.92 kg/m      Awake, Alert OX3  Lungs - CTA bilaterally  CV - RRR, no murmurs, distal pulses intact  Abd - soft, non-distended, " non-tender, +BS  Extr - No cyanosis or edema    A/P 61 year old year old female in need of colonoscopy for screening. Risks, benefits, alternatives, and complications were discussed including the possibility of perforation, bleeding, missed lesion and the patient agreed to proceed    Marvin Mattson DO on 8/18/2021 at 8:55 AM

## 2021-08-18 NOTE — ANESTHESIA CARE TRANSFER NOTE
Patient: Ayala Wilson    Procedure(s):  COLONOSCOPY    Diagnosis: Colon cancer screening [Z12.11]  Diagnosis Additional Information: No value filed.    Anesthesia Type:   General     Note:    Oropharynx: oropharynx clear of all foreign objects and spontaneously breathing  Level of Consciousness: drowsy  Oxygen Supplementation: nasal cannula    Independent Airway: airway patency satisfactory and stable  Dentition: dentition unchanged  Vital Signs Stable: post-procedure vital signs reviewed and stable  Report to RN Given: handoff report given  Patient transferred to: Phase II    Handoff Report: Identifed the Patient, Identified the Reponsible Provider, Reviewed the pertinent medical history, Discussed the surgical course, Reviewed Intra-OP anesthesia mangement and issues during anesthesia, Set expectations for post-procedure period and Allowed opportunity for questions and acknowledgement of understanding      Vitals:  Vitals Value Taken Time   BP     Temp     Pulse     Resp     SpO2         Electronically Signed By: Junior Hall CRNA, APRN CRNA  August 18, 2021  9:44 AM

## 2021-09-02 ENCOUNTER — OFFICE VISIT (OUTPATIENT)
Dept: OBGYN | Facility: CLINIC | Age: 61
End: 2021-09-02
Payer: COMMERCIAL

## 2021-09-02 VITALS
TEMPERATURE: 97.6 F | HEART RATE: 68 BPM | WEIGHT: 169 LBS | BODY MASS INDEX: 26.08 KG/M2 | SYSTOLIC BLOOD PRESSURE: 127 MMHG | DIASTOLIC BLOOD PRESSURE: 87 MMHG

## 2021-09-02 DIAGNOSIS — R87.612 PAPANICOLAOU SMEAR OF CERVIX WITH LOW GRADE SQUAMOUS INTRAEPITHELIAL LESION (LGSIL): ICD-10-CM

## 2021-09-02 PROCEDURE — 57454 BX/CURETT OF CERVIX W/SCOPE: CPT | Performed by: OBSTETRICS & GYNECOLOGY

## 2021-09-02 PROCEDURE — 88305 TISSUE EXAM BY PATHOLOGIST: CPT | Performed by: PATHOLOGY

## 2021-09-02 NOTE — PROGRESS NOTES
[facility] Colpscopy Procedure Note    Ayala Wilson  1960  [id number]    The patient was counseled on the risks (including including risk of infection, bleeding, recurrence), benefits, and alternatives of the procedure. Verbal and written consent were obtained.        2005, 2006, 2007, 2008, 2011, 2012 NIL Paps  7/13/21 LSIL pap, + HR HPV (neg 16/18). [place] due by 10/13/21    Technique: The patient was placed in the dorsal lithotomy position.  A coated speculum was placed in the vagina and the cervix visualized. Acetic acid was applied to the vagina, cervix, perineum and anus, and then visualized using the colposcope. Acetowhite staining was not seen.  Lugol's solution was not applied given pts concern with shelfish allergy. The squamocolumnar junction and transformation zone were not fully visualized and colposcopy was unsatisfactory. Colposcopic impression: normal    Biopsies were taken at 12,4,8. . Endocervical curettage was performed. Silver nitrate used to assure excellent hemostasis.  The patient tolerated the procedure well.  She was given post op instructions which included activity and pelvic restrictions.      A/P: Ayala Wilson is a s/p colpo procedure. Will call to discuss results with patient.       Trina Noonan MD   9/2/2021 10:45 AM

## 2021-09-02 NOTE — NURSING NOTE
"Initial /87 (BP Location: Left arm, Patient Position: Chair, Cuff Size: Adult Large)   Pulse 68   Temp 97.6  F (36.4  C) (Tympanic)   Wt 76.7 kg (169 lb)   LMP 04/03/2007   Breastfeeding No   BMI 26.08 kg/m   Estimated body mass index is 26.08 kg/m  as calculated from the following:    Height as of 8/18/21: 1.715 m (5' 7.5\").    Weight as of this encounter: 76.7 kg (169 lb). .    Sharon Sanders MA    "

## 2021-09-07 LAB
PATH REPORT.COMMENTS IMP SPEC: NORMAL
PATH REPORT.COMMENTS IMP SPEC: NORMAL
PATH REPORT.FINAL DX SPEC: NORMAL
PATH REPORT.GROSS SPEC: NORMAL
PATH REPORT.MICROSCOPIC SPEC OTHER STN: NORMAL
PATH REPORT.RELEVANT HX SPEC: NORMAL
PHOTO IMAGE: NORMAL

## 2021-09-13 ENCOUNTER — PATIENT OUTREACH (OUTPATIENT)
Dept: OBGYN | Facility: CLINIC | Age: 61
End: 2021-09-13

## 2021-10-19 ENCOUNTER — OFFICE VISIT (OUTPATIENT)
Dept: FAMILY MEDICINE | Facility: CLINIC | Age: 61
End: 2021-10-19
Payer: COMMERCIAL

## 2021-10-19 VITALS
HEIGHT: 68 IN | SYSTOLIC BLOOD PRESSURE: 128 MMHG | TEMPERATURE: 98.4 F | DIASTOLIC BLOOD PRESSURE: 88 MMHG | HEART RATE: 74 BPM | RESPIRATION RATE: 14 BRPM | BODY MASS INDEX: 26.37 KG/M2 | WEIGHT: 174 LBS | OXYGEN SATURATION: 94 %

## 2021-10-19 DIAGNOSIS — M25.511 ACUTE PAIN OF RIGHT SHOULDER: ICD-10-CM

## 2021-10-19 DIAGNOSIS — M62.838 TRAPEZIUS MUSCLE SPASM: Primary | ICD-10-CM

## 2021-10-19 PROCEDURE — 99214 OFFICE O/P EST MOD 30 MIN: CPT | Performed by: NURSE PRACTITIONER

## 2021-10-19 RX ORDER — CYCLOBENZAPRINE HCL 5 MG
5-10 TABLET ORAL 3 TIMES DAILY PRN
Qty: 30 TABLET | Refills: 1 | Status: SHIPPED | OUTPATIENT
Start: 2021-10-19 | End: 2022-09-20

## 2021-10-19 ASSESSMENT — PAIN SCALES - GENERAL: PAINLEVEL: SEVERE PAIN (6)

## 2021-10-19 ASSESSMENT — MIFFLIN-ST. JEOR: SCORE: 1384.82

## 2021-10-19 NOTE — PATIENT INSTRUCTIONS
VOLTAREN GEL -- OTC massage into area with pain 3-4 times per day.     Patient Education     Shoulder Clock Exercise    To start, stand tall with your ears, shoulders, and hips in line. Your feet should be slightly apart, positioned just under your hips. Focus your eyes directly in front of you.  this position for a few seconds before starting your exercise. This helps increase your awareness of proper posture.    Imagine that your right shoulder is the center of a clock. With the outer point of your shoulder, roll it around to slowly trace the outer edge of the clock.    Move clockwise first, then counterclockwise.    Repeat 3 to 5 times. Switch shoulders.  [employer] last reviewed this educational content on 3/1/2018    9892-6092 [employer]. All rights reserved. This information is not intended as a substitute for professional medical care. Always follow your healthcare professional's instructions.

## 2021-10-19 NOTE — PROGRESS NOTES
Assessment & Plan       ICD-10-CM    1. Trapezius muscle spasm  M62.838 Tens Unit/Supplies Order for DME - ONLY FOR DME     Physical Therapy Referral     cyclobenzaprine (FLEXERIL) 5 MG tablet   2. Acute pain of right shoulder  M25.511 Tens Unit/Supplies Order for DME - ONLY FOR DME     Physical Therapy Referral     cyclobenzaprine (FLEXERIL) 5 MG tablet     Recommend symptomatic management and PT. Flexeril prescribed for muscle spasm. TENS unit will help as well.              Patient Instructions     VOLTAREN GEL -- OTC massage into area with pain 3-4 times per day.     Patient Education     Shoulder Clock Exercise    To start, stand tall with your ears, shoulders, and hips in line. Your feet should be slightly apart, positioned just under your hips. Focus your eyes directly in front of you.  this position for a few seconds before starting your exercise. This helps increase your awareness of proper posture.    Imagine that your right shoulder is the center of a clock. With the outer point of your shoulder, roll it around to slowly trace the outer edge of the clock.    Move clockwise first, then counterclockwise.    Repeat 3 to 5 times. Switch shoulders.  [employer] last reviewed this educational content on 3/1/2018    [id number] [employer]. All rights reserved. This information is not intended as a substitute for professional medical care. Always follow your healthcare professional's instructions.               Return in about 2 weeks (around 11/2/2021) for ongoing symptoms if not improving.    ARCHIE Navarrete CNP  [facility]   Ayala is a 61 year old who presents for the following health issues     HPI     Musculoskeletal problem/pain  Onset/Duration: 1 month  Description  Location: shoulder - right  Joint Swelling: no  Redness: no  Pain: YES  Warmth: no  Intensity:  moderate, severe  Progression of Symptoms:  worsening and  "constant  Accompanying signs and symptoms:   Fevers: no  Numbness/tingling/weakness: YES- weakness  History  Trauma to the area: no  Recent illness:  no  Previous similar problem: no  Previous evaluation:  no  Precipitating or alleviating factors:  Aggravating factors include: lifting, exercise and overuse  Therapies tried and outcome: rest/inactivity and lidocaine patch, Tens unit works some    Review of Systems   Constitutional, HEENT, cardiovascular, pulmonary, gi and gu systems are negative, except as otherwise noted.      Objective    /88 (BP Location: Right arm, Patient Position: Chair, Cuff Size: Adult Large)   Pulse 74   Temp 98.4  F (36.9  C) (Tympanic)   Resp 14   Ht 1.715 m (5' 7.5\")   Wt 78.9 kg (174 lb)   LMP 04/03/2007   SpO2 94%   Breastfeeding No   BMI 26.85 kg/m    Body mass index is 26.85 kg/m .     Physical Exam   GENERAL: healthy, alert and no distress  NECK: no adenopathy, no asymmetry, masses, or scars and thyroid normal to palpation  MS: right upper trapezius muscle spasm with TTP. Some slight kyphosis noted in upper back on exam. Right shoulder lower than left shoulder.   BACK: no CVA tenderness, no paralumbar tenderness  PSYCH: mentation appears normal, affect normal/bright  LYMPH: no cervical, supraclavicular, axillary, or inguinal adenopathy    "

## 2021-10-20 ENCOUNTER — TELEPHONE (OUTPATIENT)
Dept: FAMILY MEDICINE | Facility: CLINIC | Age: 61
End: 2021-10-20

## 2021-10-20 NOTE — TELEPHONE ENCOUNTER
Reason for call:    Symptom or request:     Patient called stating that she was seen yesterday 10/19-- given rx for flexeril and it was suggested she  VOLTAREN GEL OTC, she read the direction on product and is concerned as she med is not to be used on joints. She would be using med for her shoulder     patient is looking for further direction.       Best Time:  any    Can we leave a detailed message on this number?  YES     Ania [facility]IN  [facility]

## 2021-11-02 ENCOUNTER — HOSPITAL ENCOUNTER (OUTPATIENT)
Dept: PHYSICAL THERAPY | Facility: CLINIC | Age: 61
Setting detail: THERAPIES SERIES
End: 2021-11-02
Attending: NURSE PRACTITIONER
Payer: COMMERCIAL

## 2021-11-02 DIAGNOSIS — M62.838 TRAPEZIUS MUSCLE SPASM: ICD-10-CM

## 2021-11-02 DIAGNOSIS — M25.511 ACUTE PAIN OF RIGHT SHOULDER: ICD-10-CM

## 2021-11-02 PROCEDURE — 97140 MANUAL THERAPY 1/> REGIONS: CPT | Mod: GP

## 2021-11-02 PROCEDURE — 97110 THERAPEUTIC EXERCISES: CPT | Mod: GP

## 2021-11-02 PROCEDURE — 97161 PT EVAL LOW COMPLEX 20 MIN: CPT | Mod: GP

## 2021-11-02 NOTE — PROGRESS NOTES
11/02/21 0900   General Information   Type of Visit Initial OP Ortho PT Evaluation   Start of Care Date 11/02/21   Referring Physician Kaylene Drew, APRN CNP   Patient/Family Goals Statement get better   Orders Evaluate and Treat   Date of Order 10/19/21   Certification Required? No   Medical Diagnosis Trapezius muscle spasm, Acute pain of right shoulder   Surgical/Medical history reviewed Yes  (na)   Body Part(s)   Body Part(s) Shoulder   Presentation and Etiology   Pertinent history of current problem (include personal factors and/or comorbidities that impact the POC) Pt reports she was sucking up leaves and bagging them and then the next day her shoulder hurt. It is getting slightly better. Sleeping and rolliing on it, yard work and housework make it worse.    Impairments A. Pain;B. Decreased WB tolerance;E. Decreased flexibility;F. Decreased strength and endurance;J. Burning;L. Tingling   Functional Limitations perform activities of daily living;perform desired leisure / sports activities   Symptom Location R post shld   How/Where did it occur With repetition/overuse;At home   Onset date of current episode/exacerbation 10/15/21   Chronicity New   Pain rating (0-10 point scale) Best (/10);Worst (/10)  (currently 3/10)   Best (/10) 3   Worst (/10) 9   Pain quality A. Sharp;C. Aching;D. Burning   Frequency of pain/symptoms A. Constant   Pain/symptoms are: Worse during the night   Pain/symptoms exacerbated by C. Lifting;E. Rest;H. Overhead reach;K. Home tasks;L. Work tasks  (sleeping, stirring, opening doors, donning jacket)   Pain/symptoms eased by I. OTC medication(s)   Progression of symptoms since onset: Improved   Prior Level of Function   Functional Level Prior Comment independent   Current Level of Function   Current Community Support Family/friend caregiver   Patient role/employment history F. Retired   Living environment House/townhome   Home/community accessibility drives   Fall Risk Screen   Fall  screen completed by PT   Have you fallen 2 or more times in the past year? No   Have you fallen and had an injury in the past year? No   Is patient a fall risk? No   Abuse Screen (yes response referral indicated)   Feels Unsafe at Home or Work/School no   Feels Threatened by Someone no   Does Anyone Try to Keep You From Having Contact with Others or Doing Things Outside Your Home? no   Physical Signs of Abuse Present no   Functional Scales   Functional Scales Other   Other Scales  SPADI 72.31   Shoulder Objective Findings   Side (if bilateral, select both right and left) Right   Posture fwd head   Neer's Test -   Causey-Ruben Test -   Kiowa's Test -   Crossover Test + post shld pain   Palpation very tender R infraspinatus and teres mm belly   Right Shoulder Flexion AROM WFL +pain   Right Shoulder Abduction AROM 100* ++post shld pain   Right Shoulder ER AROM WFL   Right Shoulder IR AROM WFL +pain   Right Shoulder Flexion Strength 5-/5 +   Right Shoulder Abduction Strength 5-/5 +   Right Shoulder ER Strength 5/5   Right Shoulder IR Strength 5/5   Planned Therapy Interventions   Planned Therapy Interventions ROM;strengthening;stretching;manual therapy   Clinical Impression   Criteria for Skilled Therapeutic Interventions Met yes, treatment indicated   PT Diagnosis R posterior shoulder pain. Signs and sxs suggest an infraspinatus/teres mm strain.   Influenced by the following impairments pain   Functional limitations due to impairments reaching, sleeping, stirring, opening doors, donning jacket   Clinical Presentation Stable/Uncomplicated   Clinical Presentation Rationale clinical judgement   Clinical Decision Making (Complexity) Low complexity   Therapy Frequency 1 time/week   Predicted Duration of Therapy Intervention (days/wks) 4 weeks   Risk & Benefits of therapy have been explained Yes   Patient, Family & other staff in agreement with plan of care Yes   Education Assessment   Preferred Learning Style  Listening;Demonstration;Pictures/video   Barriers to Learning No barriers   ORTHO GOALS   PT Ortho Eval Goals 1;2;3;4   Ortho Goal 1   Goal Identifier 1   Goal Description Pt will be able to sleep through the night without waking with pain.   Target Date 11/16/21   Ortho Goal 2   Goal Identifier 2   Goal Description Pt will be able to dress/bathe self with < 3/10 pain.   Target Date 11/23/21   Ortho Goal 3   Goal Identifier 3   Goal Description Pt will be able to reach an item off a high shelf with < 3/10 pain.   Target Date 11/30/21   Ortho Goal 4   Goal Identifier 4   Goal Description Pt will be independent with HEP for optimal functional recovery.   Target Date 11/30/21   Total Evaluation Time   PT Larissa Low Complexity Minutes (10482) 15     Mona Pineda PT

## 2021-11-09 ENCOUNTER — HOSPITAL ENCOUNTER (OUTPATIENT)
Dept: PHYSICAL THERAPY | Facility: CLINIC | Age: 61
Setting detail: THERAPIES SERIES
End: 2021-11-09
Attending: NURSE PRACTITIONER
Payer: COMMERCIAL

## 2021-11-09 PROCEDURE — 97110 THERAPEUTIC EXERCISES: CPT | Mod: GP

## 2021-11-09 PROCEDURE — 97140 MANUAL THERAPY 1/> REGIONS: CPT | Mod: GP

## 2021-11-09 NOTE — PROGRESS NOTES
Outpatient Physical Therapy Discharge Note     Patient: Ayala Wilson  : 1960    Beginning/End Dates of Reporting Period:  2021 to 2021    Referring Provider: Kaylene Drew APRN CNP    Therapy Diagnosis: R posterior shoulder pain. Signs and sxs suggest an infraspinatus/teres mm strain.     Client Self Report: Pt reports her shld feels much better. She has no pain unless she overdoes it, like rakiing leaves. She rates her pain at 1/10 with dressing/bathing, and reaching up to a high shelf. She is ready to try the HEP independently.    Objective Measurements:  Objective Measure: Palpation  Details: mild tenderness R infraspinatus insertion  Objective Measure: R shld AROM  Details: WFL without incr pain  Objective Measure: R shld strength   Details:           Goals:  Goal Identifier 1   Goal Description Pt will be able to sleep through the night without waking with pain.   Target Date 21   Date Met  21   Progress (detail required for progress note):       Goal Identifier 2   Goal Description Pt will be able to dress/bathe self with < 3/10 pain.   Target Date 21   Date Met  21   Progress (detail required for progress note):       Goal Identifier 3   Goal Description Pt will be able to reach an item off a high shelf with < 3/10 pain.   Target Date 21   Date Met  21   Progress (detail required for progress note):       Goal Identifier 4   Goal Description Pt will be independent with HEP for optimal functional recovery.   Target Date 21   Date Met  21   Progress (detail required for progress note):             Plan:  Discharge from therapy.    Discharge:    Reason for Discharge: Patient has met all goals.    Equipment Issued: Yellow TB    Discharge Plan: Patient to continue home program.    Mona Pineda PT

## 2022-01-20 ENCOUNTER — OFFICE VISIT (OUTPATIENT)
Dept: FAMILY MEDICINE | Facility: CLINIC | Age: 62
End: 2022-01-20
Payer: COMMERCIAL

## 2022-01-20 ENCOUNTER — TELEPHONE (OUTPATIENT)
Dept: FAMILY MEDICINE | Facility: CLINIC | Age: 62
End: 2022-01-20

## 2022-01-20 VITALS
TEMPERATURE: 98.9 F | RESPIRATION RATE: 14 BRPM | WEIGHT: 175 LBS | SYSTOLIC BLOOD PRESSURE: 132 MMHG | HEIGHT: 68 IN | HEART RATE: 84 BPM | OXYGEN SATURATION: 95 % | BODY MASS INDEX: 26.52 KG/M2 | DIASTOLIC BLOOD PRESSURE: 88 MMHG

## 2022-01-20 DIAGNOSIS — H10.9 CONJUNCTIVITIS OF BOTH EYES, UNSPECIFIED CONJUNCTIVITIS TYPE: Primary | ICD-10-CM

## 2022-01-20 PROCEDURE — 99213 OFFICE O/P EST LOW 20 MIN: CPT | Performed by: FAMILY MEDICINE

## 2022-01-20 RX ORDER — MOXIFLOXACIN 5 MG/ML
1 SOLUTION/ DROPS OPHTHALMIC 3 TIMES DAILY
Qty: 3 ML | Refills: 0 | Status: SHIPPED | OUTPATIENT
Start: 2022-01-20 | End: 2022-01-27

## 2022-01-20 RX ORDER — POLYMYXIN B SULFATE AND TRIMETHOPRIM 1; 10000 MG/ML; [USP'U]/ML
1-2 SOLUTION OPHTHALMIC EVERY 4 HOURS
Qty: 10 ML | Refills: 0 | Status: SHIPPED | OUTPATIENT
Start: 2022-01-20 | End: 2022-01-25

## 2022-01-20 ASSESSMENT — PAIN SCALES - GENERAL: PAINLEVEL: NO PAIN (0)

## 2022-01-20 ASSESSMENT — MIFFLIN-ST. JEOR: SCORE: 1389.35

## 2022-01-20 NOTE — PATIENT INSTRUCTIONS
Your BMI is Body mass index is Body mass index is 27 kg/m .     A BMI of 18.5 to 24.9 is in the healthy range. A person with a BMI of 25 to 29.9 is considered overweight, and someone with a BMI of 30 or greater is considered obese. More than two-thirds of American adults are considered overweight or obese.  Weight management is a personal decision.  If you are interested in exploring weight loss strategies, the following discussion covers the approaches that may be successful. Body mass index (BMI) is one way to tell whether you are at a healthy weight, overweight, or obese. It measures your weight in relation to your height.  Being overweight or obese increases the risk for further weight gain. Excess weight may lead to heart disease and diabetes.  Creating and following plans for healthy eating and physical activity may help you improve your health.  Weight control is part of healthy lifestyle and includes exercise, emotional health, and healthy eating habits. Careful eating habits lifelong are the mainstay of weight control. Though there are significant health benefits from weight loss, long-term weight loss with diet alone may be very difficult to achieve- studies show long-term success with dietary management alone in less than 10% of people. Attaining a healthy weight may be especially difficult to achieve in those with severe obesity. In some cases, medications, devices and surgical management might be considered.  What can you do?    Keep a food journal to help with mindful eating and finding ways to modify your diet.      Reduce the amount of processed food in your diet. Focus on adding vegetables, and lean proteins.    Reduce dietary carbohydrates. Limiting to  gm of carbohydrates per day has been shows to help boost weight loss.  If you have diabetes or are on diabetic medications do not do this without talking to your physician or healthcare provider.    Diet combined with exercise helps maintain  muscle while optimizing fat loss. Strength training is particularly important for building and maintaining muscle mass. Exercise helps reduce stress, increase energy, and improves fitness. Increasing exercise without diet control, however, may not burn enough calories to loose weight.         Start walking three days a week 10-20 minutes at a time    Work towards walking thirty minutes five days a week      Eventually, increase the speed of your walking for 1-2 minutes at time    In addition, we recommend that you review healthy lifestyles and methods for weight loss available through the National Institutes of Health patient information sites:  http://win.niddk.nih.gov/publications/index.htm    Also look into health and wellness programs that may be available through your health insurance provider, employer, local community center, or adri club.  Patient Education     Conjunctivitis, Nonspecific    The membrane that covers the white part of your eye (the conjunctiva) is inflamed. Inflammation happens when your body responds to an injury, allergic reaction, infection, or illness. Symptoms of inflammation in the eye may include redness, irritation, itching, swelling, or burning. These symptoms should go away within the next 24 hours. Conjunctivitis may be related to a particle that was in your eye. If so, it may wash out with your tears or irrigation treatment. Being exposed to liquid chemicals or fumes may also cause this reaction.    Home care    Put a cold pack on the eye for 20 minutes at a time. This will reduce pain. To make a cold pack, put ice cubes in a plastic bag that seals at the top. Wrap the bag in a clean, thin towel or cloth.    Artificial tears may be prescribed to reduce irritation or redness. These should be used 3 to 4 times a day.    You may use acetaminophen or ibuprofen to control pain, unless another medicine was prescribed. If you have chronic liver or kidney disease, talk with your  healthcare provider before using these medicines. Also talk with your provider if you have ever had a stomach ulcer or gastrointestinal bleeding.    Follow-up care  Follow up with your healthcare provider, or as advised.  When to seek medical advice  Call your healthcare provider right away if any of these occur:    Eyelid swells more    Eye pain gets worse    Redness or drainage from the eye gets worse    Blurry vision gets worse or you have increased sensitivity to light    Normal vision does not return within 24 to 48 hours  [employer] last reviewed this educational content on 2/1/2020 2000-2021 [employer]. All rights reserved. This information is not intended as a substitute for professional medical care. Always follow your healthcare professional's instructions.

## 2022-01-20 NOTE — TELEPHONE ENCOUNTER
Reason for call:  Patient reporting a symptom    Symptom or request: [facility] calling.  Pt wants a different Rx for her eyes from Dr. Daniels.  She states that she gets a rash with she uses Neosporin, so she does not want to use Rx prescribed and wants different Rx sent to pharmacy.      Duration (how long have symptoms been present): ongoing    Have you been treated for this before? Yes    Additional comments:     Phone Number patient can be reached at:  Home number on file [phone]    Best Time:  any    Can we leave a detailed message on this number:  YES    Call taken on 1/20/2022 at 11:15 AM by Radha Benitez

## 2022-01-20 NOTE — TELEPHONE ENCOUNTER
Patient is at the pharmacy now. She is requesting a different medication instead of the polytrim drops as she is allergic to a component in it. She has an allergy to neosporin which causes a rash. This is not listed on her current allergy list. Will discuss request and allergy with Dr. Daniels and will add allergy to patients chart. Dr. Daniels will send in a different prescription. Patient notified.  Martha Garcia RN

## 2022-01-20 NOTE — PROGRESS NOTES
"  Assessment & Plan     Conjunctivitis of both eyes, unspecified conjunctivitis type  Discussed most often viral but given the degree of swelling and discomfort empirically treat with Polytrim.  Additional management instructions provided see AVS.  Follow-up if not improved or worse.  - trimethoprim-polymyxin b (POLYTRIM) 20128-5.1 UNIT/ML-% ophthalmic solution; Place 1-2 drops into both eyes every 4 hours      After the visit, patient went to the pharmacy to  Polytrim drops.  She called us back saying that she is allergic to Neosporin.  This is not listed on her allergy list and she did not mention it is a correction to her allergies during rooming.  I did add Neosporin as an allergy.  As a result we will switch her to Vigamox eyedrops instead.           BMI:   Estimated body mass index is 27 kg/m  as calculated from the following:    Height as of this encounter: 1.715 m (5' 7.5\").    Weight as of this encounter: 79.4 kg (175 lb).   Weight management plan: See AVS.        Return in about 1 week (around 1/27/2022) for if not improved or sooner if worsening.    Vikas Daniels MD  [facility]   Ayala is a 61 year old who presents for the following health issues     HPI       Eye(s) Problem  Onset/Duration: 4 days  Description:   Location: YES- both eyes  Pain: YES  Redness: YES  Accompanying Signs & Symptoms:  Discharge/mattering: YES  Swelling: YES  Visual changes: YES- a little blurry  Fever: no  Nasal Congestion: no  Bothered by bright lights: no  History:  Trauma: no  Foreign body exposure: no  Wearing contacts: no  Precipitating or alleviating factors: None  Therapies tried and outcome: None      Review of Systems   Constitutional, neuro, ENT, endocrine, pulmonary, cardiac, gastrointestinal, genitourinary, musculoskeletal, integument and psychiatric systems are negative, except as otherwise noted.       Objective    /88 (BP Location: Right arm, " "Patient Position: Chair, Cuff Size: Adult Regular)   Pulse 84   Temp 98.9  F (37.2  C) (Tympanic)   Resp 14   Ht 1.715 m (5' 7.5\")   Wt 79.4 kg (175 lb)   LMP 04/03/2007   SpO2 95%   BMI 27.00 kg/m    Body mass index is 27 kg/m .  Physical Exam   GENERAL: Pleasant, well appearing female.  HEENT: PERRL, EOMI, mild bilateral upper and lower lid edema without lid erythema, conjunctival erythema and scant purulent secretions. No contralateral eye pain with contralateral pupillary reflex.     "

## 2022-01-25 ENCOUNTER — OFFICE VISIT (OUTPATIENT)
Dept: FAMILY MEDICINE | Facility: CLINIC | Age: 62
End: 2022-01-25
Payer: COMMERCIAL

## 2022-01-25 ENCOUNTER — TELEPHONE (OUTPATIENT)
Dept: FAMILY MEDICINE | Facility: CLINIC | Age: 62
End: 2022-01-25
Payer: COMMERCIAL

## 2022-01-25 VITALS
BODY MASS INDEX: 26.52 KG/M2 | TEMPERATURE: 98.7 F | WEIGHT: 175 LBS | RESPIRATION RATE: 16 BRPM | OXYGEN SATURATION: 99 % | HEART RATE: 76 BPM | SYSTOLIC BLOOD PRESSURE: 138 MMHG | DIASTOLIC BLOOD PRESSURE: 102 MMHG | HEIGHT: 68 IN

## 2022-01-25 DIAGNOSIS — H10.9 BACTERIAL CONJUNCTIVITIS OF BOTH EYES: Primary | ICD-10-CM

## 2022-01-25 DIAGNOSIS — B96.89 BACTERIAL CONJUNCTIVITIS OF BOTH EYES: Primary | ICD-10-CM

## 2022-01-25 DIAGNOSIS — I10 BENIGN ESSENTIAL HYPERTENSION: ICD-10-CM

## 2022-01-25 PROBLEM — L30.9 ECZEMA: Status: ACTIVE | Noted: 2017-01-10

## 2022-01-25 PROCEDURE — 99214 OFFICE O/P EST MOD 30 MIN: CPT | Performed by: FAMILY MEDICINE

## 2022-01-25 RX ORDER — ERYTHROMYCIN 5 MG/G
0.5 OINTMENT OPHTHALMIC 2 TIMES DAILY
Qty: 7 G | Refills: 0 | Status: SHIPPED | OUTPATIENT
Start: 2022-01-25 | End: 2022-02-01

## 2022-01-25 ASSESSMENT — MIFFLIN-ST. JEOR: SCORE: 1397.29

## 2022-01-25 ASSESSMENT — PAIN SCALES - GENERAL: PAINLEVEL: NO PAIN (0)

## 2022-01-25 NOTE — TELEPHONE ENCOUNTER
Ayala states she is being treated for pink eye and has been using the drops but is not getting much better at all. She woke up this morning and it was crusted shut again with white goop and is very itchy and watery. She will come for a recheck today with Dr Cartagena.    Desirae Ospina RN

## 2022-01-25 NOTE — PROGRESS NOTES
"  Assessment & Plan     Bacterial conjunctivitis of both eyes  - erythromycin (ROMYCIN) 5 MG/GM ophthalmic ointment  Dispense: 7 g; Refill: 0    Benign essential hypertension  Multiple measured Bps here visit dx of HTN but meds not necessarily indicated if more related to white coat / acute stress -> education on importance of BP and how to check at home  - Home Blood Pressure Monitor Order for DME - ONLY FOR DME    Patient Instructions   If you do not notice ANY improvements by Thursday, give us a call and ask for an eye referral. Tell them that Dr. Cartagena said she'd put in an urgent eye referral. If needed, I'll refer you next door    No follow-ups on file.    Marizol Cartagena MD  [facility]   Ayala is a 61 year old who presents for the following health issues     HPI     Chief Complaint   Patient presents with     Eye Problem     Orders     Would like COVID antibody testing     Eye(s) Problem  Onset/Duration: 9 days  Description:   Location: bilateral eyes  Pain: YES  Redness: YES  Accompanying Signs & Symptoms:  Discharge/mattering: YES  Swelling: YES  Visual changes: YES  Fever: no  Nasal Congestion: YES  Bothered by bright lights: no  History:  Trauma: no  Foreign body exposure: no  Wearing contacts: no  Precipitating or alleviating factors: None  Therapies tried and outcome: moxifloxacin with little relief    She was able to  the moxifloxacin and treat her eyes with that as of last Thursday   Denies that any improvement at all in her symptoms    Denies any changed in the home environment and that it did start acutely  Denies any toxin exposures  Does not wear contact lenses    Review of Systems   As above      Objective    BP (!) 138/102 (BP Location: Right arm, Patient Position: Sitting, Cuff Size: Adult Regular)   Pulse 76   Temp 98.7  F (37.1  C) (Tympanic)   Resp 16   Ht 1.727 m (5' 8\")   Wt 79.4 kg (175 lb)   LMP 04/03/2007   SpO2 99%   " Breastfeeding No   BMI 26.61 kg/m    Body mass index is 26.61 kg/m .  Physical Exam   GENERAL: healthy, alert and no distress  EYES: PERRL, EOMI, eyelids- normal, conjunctiva diffusely red, slightly over eye but very much over lower lid. Cornea appears normal grossly. pupils- appear normal  NECK: no adenopathy, no asymmetry, masses, or scars and thyroid normal to palpation  RESP: normal respiratory effort, speaking in complete sentences  ABDOMEN: soft, nontender, no hepatosplenomegaly, no masses and bowel sounds normal  MS: no gross musculoskeletal defects noted, no edema

## 2022-01-27 ENCOUNTER — TELEPHONE (OUTPATIENT)
Dept: FAMILY MEDICINE | Facility: CLINIC | Age: 62
End: 2022-01-27
Payer: COMMERCIAL

## 2022-01-27 DIAGNOSIS — H10.33 ACUTE CONJUNCTIVITIS OF BOTH EYES, UNSPECIFIED ACUTE CONJUNCTIVITIS TYPE: Primary | ICD-10-CM

## 2022-01-27 DIAGNOSIS — H57.13 EYE PAIN, BILATERAL: ICD-10-CM

## 2022-01-27 NOTE — TELEPHONE ENCOUNTER
Reason for Call: Request for an order or referral:    Order or referral being requested: URGENT referral to Eye Clinic. See notes from 1/25 visit with Dr Cartagena    Date needed: as soon as possible    Has the patient been seen by the PCP for this problem? YES    Additional comments:     Phone number Patient can be reached at:  Home number on file [phone] (home)    Best Time:      Can we leave a detailed message on this number?  YES    Call taken on 1/27/2022 at 7:41 AM by Sharon Pérez

## 2022-01-27 NOTE — TELEPHONE ENCOUNTER
Reports persistent eye irritation.  Symptoms of watery, itchy, red, and teary.  She has tried and failed moxifloxacin and erythromycin.   She had similar symptoms like this many years ago.  She needed to see an eye doctor.  She would [facility].   ARCHIE Navarrete CNP

## 2022-05-09 NOTE — PATIENT INSTRUCTIONS
If you do not notice ANY improvements by Thursday, give us a call and ask for an eye referral. Tell them that Dr. Cartagena said she'd put in an urgent eye referral. If needed, I'll refer you next door  
Offered and patient declined

## 2022-08-11 ENCOUNTER — PATIENT OUTREACH (OUTPATIENT)
Dept: FAMILY MEDICINE | Facility: CLINIC | Age: 62
End: 2022-08-11

## 2022-08-11 PROBLEM — N87.0 MILD DYSPLASIA OF CERVIX: Status: ACTIVE | Noted: 2021-07-21

## 2022-08-11 NOTE — LETTER
August 11, 2022      Ayala Wilson  [id number] [address] [id number]        Dear ,    This letter is to remind you that you are due for your follow-up Pap smear and Human Papillomavirus (HPV) test.    Please call [phone] to schedule your appointment at your earliest convenience.    If you have completed the appointment outside of the [facility] system, please have the records forwarded to our office. We will update your chart for your provider to review before your next annual wellness visit.     Thank you for choosing [facility]!      Sincerely,    Your [facility] Care Team

## 2022-09-20 ENCOUNTER — OFFICE VISIT (OUTPATIENT)
Dept: FAMILY MEDICINE | Facility: CLINIC | Age: 62
End: 2022-09-20
Payer: COMMERCIAL

## 2022-09-20 ENCOUNTER — ANCILLARY PROCEDURE (OUTPATIENT)
Dept: GENERAL RADIOLOGY | Facility: CLINIC | Age: 62
End: 2022-09-20
Attending: NURSE PRACTITIONER
Payer: COMMERCIAL

## 2022-09-20 VITALS
SYSTOLIC BLOOD PRESSURE: 122 MMHG | DIASTOLIC BLOOD PRESSURE: 70 MMHG | BODY MASS INDEX: 26.37 KG/M2 | RESPIRATION RATE: 20 BRPM | HEART RATE: 72 BPM | HEIGHT: 68 IN | OXYGEN SATURATION: 96 % | TEMPERATURE: 98.6 F | WEIGHT: 174 LBS

## 2022-09-20 DIAGNOSIS — Z13.220 SCREENING FOR HYPERLIPIDEMIA: ICD-10-CM

## 2022-09-20 DIAGNOSIS — G89.29 CHRONIC PAIN OF RIGHT KNEE: ICD-10-CM

## 2022-09-20 DIAGNOSIS — Z13.1 SCREENING FOR DIABETES MELLITUS: ICD-10-CM

## 2022-09-20 DIAGNOSIS — M25.561 CHRONIC PAIN OF RIGHT KNEE: ICD-10-CM

## 2022-09-20 DIAGNOSIS — Z87.891 FORMER SMOKER: ICD-10-CM

## 2022-09-20 DIAGNOSIS — Z12.4 CERVICAL CANCER SCREENING: ICD-10-CM

## 2022-09-20 DIAGNOSIS — Z12.2 SCREENING FOR LUNG CANCER: ICD-10-CM

## 2022-09-20 DIAGNOSIS — Z00.00 ROUTINE GENERAL MEDICAL EXAMINATION AT A HEALTH CARE FACILITY: Primary | ICD-10-CM

## 2022-09-20 LAB
ANION GAP SERPL CALCULATED.3IONS-SCNC: 6 MMOL/L (ref 7–15)
BUN SERPL-MCNC: 19.5 MG/DL (ref 8–23)
CALCIUM SERPL-MCNC: 9.1 MG/DL (ref 8.8–10.2)
CHLORIDE SERPL-SCNC: 106 MMOL/L (ref 98–107)
CHOLEST SERPL-MCNC: 199 MG/DL
CREAT SERPL-MCNC: 0.86 MG/DL (ref 0.51–0.95)
DEPRECATED HCO3 PLAS-SCNC: 31 MMOL/L (ref 22–29)
GFR SERPL CREATININE-BSD FRML MDRD: 75 ML/MIN/1.73M2
GLUCOSE SERPL-MCNC: 99 MG/DL (ref 70–99)
HDLC SERPL-MCNC: 46 MG/DL
LDLC SERPL CALC-MCNC: 111 MG/DL
NONHDLC SERPL-MCNC: 153 MG/DL
POTASSIUM SERPL-SCNC: 4.9 MMOL/L (ref 3.4–5.3)
SODIUM SERPL-SCNC: 143 MMOL/L (ref 136–145)
TRIGL SERPL-MCNC: 212 MG/DL

## 2022-09-20 PROCEDURE — 87624 HPV HI-RISK TYP POOLED RSLT: CPT | Performed by: NURSE PRACTITIONER

## 2022-09-20 PROCEDURE — 73562 X-RAY EXAM OF KNEE 3: CPT | Mod: TC | Performed by: RADIOLOGY

## 2022-09-20 PROCEDURE — 36415 COLL VENOUS BLD VENIPUNCTURE: CPT | Performed by: NURSE PRACTITIONER

## 2022-09-20 PROCEDURE — 80061 LIPID PANEL: CPT | Performed by: NURSE PRACTITIONER

## 2022-09-20 PROCEDURE — 99396 PREV VISIT EST AGE 40-64: CPT | Mod: 25 | Performed by: NURSE PRACTITIONER

## 2022-09-20 PROCEDURE — 99213 OFFICE O/P EST LOW 20 MIN: CPT | Mod: 25 | Performed by: NURSE PRACTITIONER

## 2022-09-20 PROCEDURE — G0145 SCR C/V CYTO,THINLAYER,RESCR: HCPCS | Performed by: NURSE PRACTITIONER

## 2022-09-20 PROCEDURE — 80048 BASIC METABOLIC PNL TOTAL CA: CPT | Performed by: NURSE PRACTITIONER

## 2022-09-20 ASSESSMENT — ENCOUNTER SYMPTOMS
FREQUENCY: 0
ABDOMINAL PAIN: 0
CHILLS: 0
HEMATURIA: 0
JOINT SWELLING: 1
SHORTNESS OF BREATH: 0
FEVER: 0
HEMATOCHEZIA: 0
WEAKNESS: 0
DIARRHEA: 0
ARTHRALGIAS: 1
HEARTBURN: 0
NAUSEA: 0
EYE PAIN: 0
BREAST MASS: 0
HEADACHES: 0
MYALGIAS: 0
NERVOUS/ANXIOUS: 0
DIZZINESS: 0
DYSURIA: 0
CONSTIPATION: 0
COUGH: 0
SORE THROAT: 0
PARESTHESIAS: 0
PALPITATIONS: 0

## 2022-09-20 ASSESSMENT — PAIN SCALES - GENERAL: PAINLEVEL: NO PAIN (0)

## 2022-09-20 NOTE — PROGRESS NOTES
SUBJECTIVE:   CC: Ayala is an 62 year old who presents for preventive health visit.       Patient has been advised of split billing requirements and indicates understanding: Yes  Healthy Habits:     Getting at least 3 servings of Calcium per day:  Yes    Bi-annual eye exam:  Yes    Dental care twice a year:  Yes    Sleep apnea or symptoms of sleep apnea:  Excessive snoring    Diet:  Regular (no restrictions)    Frequency of exercise:  4-5 days/week    Duration of exercise:  30-45 minutes    Taking medications regularly:  Yes    Barriers to taking medications:  None    Medication side effects:  Not applicable    PHQ-2 Total Score: 0    Additional concerns today:  Yes (right side knee pain x 1 year has got worse)        Joint Pain    Onset: Knee pain right side 1 year.     Description:   Location: right knee  Character: Sharp and Dull ache    Intensity: moderate    Progression of Symptoms: intermittent    Accompanying Signs & Symptoms:  Other symptoms: difficulty standing/ squatting due to pain. Feels weak when trying to stand from a deep squat.     History:   Previous similar pain: no       Precipitating factors:   Trauma or overuse: no     Alleviating factors:  Improved by: rest/inactivity, massage and stretching    Therapies Tried and outcome: at home management with rest. Continues to walk on a daily basis.   Pain is present on the right and left side of her patella lower aspect. No warmth, swelling, or bruising present.         Today's PHQ-2 Score:   PHQ-2 ( 1999 [employer]) 9/20/2022   Q1: Little interest or pleasure in doing things 0   Q2: Feeling down, depressed or hopeless 0   PHQ-2 Score 0   PHQ-2 Total Score (12-17 Years)- Positive if 3 or more points; Administer PHQ-A if positive -   Q1: Little interest or pleasure in doing things Not at all   Q2: Feeling down, depressed or hopeless Not at all   PHQ-2 Score 0       Abuse: Current or Past (Physical, Sexual or Emotional) - No  Do you feel safe in your  environment? Yes        Social History     Tobacco Use     Smoking status: Former Smoker     Packs/day: 1.00     Years: 30.00     Pack years: 30.00     Types: Cigarettes     Smokeless tobacco: Never Used     Tobacco comment: pt quit smoking January 5th 2021   Substance Use Topics     Alcohol use: No         Alcohol Use 9/20/2022   Prescreen: >3 drinks/day or >7 drinks/week? No   Prescreen: >3 drinks/day or >7 drinks/week? -       Reviewed orders with patient.  Reviewed health maintenance and updated orders accordingly - Yes  BP Readings from Last 3 Encounters:   09/20/22 122/70   01/25/22 (!) 138/102   01/20/22 132/88    Wt Readings from Last 3 Encounters:   09/20/22 78.9 kg (174 lb)   01/25/22 79.4 kg (175 lb)   01/20/22 79.4 kg (175 lb)                  Patient Active Problem List   Diagnosis     Sensorineural hearing loss     Tobacco use disorder     CARDIOVASCULAR SCREENING; LDL GOAL LESS THAN 160     Sinus disorder     Aura     Dizziness     GERD (gastroesophageal reflux disease)     Pustular psoriasis     Mild dysplasia of cervix     Eczema     Past Surgical History:   Procedure Laterality Date     COLONOSCOPY N/A 8/18/2021    Procedure: COLONOSCOPY;  Surgeon: Marvin Mattson DO;  Location: [facility]     SURGICAL HISTORY OF -   1974    appendectomy       Social History     Tobacco Use     Smoking status: Former Smoker     Packs/day: 1.00     Years: 30.00     Pack years: 30.00     Types: Cigarettes     Smokeless tobacco: Never Used     Tobacco comment: pt quit smoking January 5th 2021   Substance Use Topics     Alcohol use: No     Family History   Problem Relation Age of Onset     Cerebrovascular Disease Mother         CVA     Cancer Father         bone     Gastrointestinal Disease Daughter         crohn's         No current outpatient medications on file.     Allergies   Allergen Reactions     Dupixent [Dupilumab] Hives, Itching and Swelling     Humira Hives, Itching and Swelling     Neosporin  [Neomycin-Polymyx-Gramicid]      Shellfish-Derived Products      Zyban [Bupropion Hcl] Rash       Breast Cancer Screening:    Breast CA Risk Assessment (FHS-7) 2021   Do you have a family history of breast, colon, or ovarian cancer? No / Unknown No / Unknown         Mammogram Screening: Recommended mammography every 1-2 years with patient discussion and risk factor consideration  Pertinent mammograms are reviewed under the imaging tab.    History of abnormal Pap smear:   Last 3 Pap and HPV Results:   PAP / HPV Latest Ref Rng & Units 2021   PAP   Low-grade squamous intraepithelial lesion (LSIL) encompassing HPV/mild dysplasia/CIN1(A) - -   PAP (Historical) - - NIL NIL   HPV16 - Negative - -   HPV18 - Negative - -   HRHPV - Positive - -     PAP / HPV Latest Ref Rng & Units 2021   PAP   Low-grade squamous intraepithelial lesion (LSIL) encompassing HPV/mild dysplasia/CIN1(A) - -   PAP (Historical) - - NIL NIL   HPV16 - Negative - -   HPV18 - Negative - -   HRHPV - Positive - -     Reviewed and updated as needed this visit by clinical staff   Tobacco  Allergies  Meds  Problems  Med Hx  Surg Hx  Fam Hx  Soc   Hx          Reviewed and updated as needed this visit by Provider   Tobacco  Allergies  Meds  Problems  Med Hx  Surg Hx  Fam Hx           Past Medical History:   Diagnosis Date     Abnormal Pap smear of cervix 2021     Cervical high risk HPV (human papillomavirus) test positive 2021     Contact dermatitis and other eczema, due to unspecified cause      Tobacco use disorder       Past Surgical History:   Procedure Laterality Date     COLONOSCOPY N/A 2021    Procedure: COLONOSCOPY;  Surgeon: Marvin Mattson DO;  Location: [facility]     SURGICAL HISTORY OF -       appendectomy     OB History    Para Term  AB Living   2 2 2 0 0 0   SAB IAB Ectopic Multiple Live Births   0 0 0 0 0      # Outcome Date GA Lbr  "Josh/2nd Weight Sex Delivery Anes PTL Lv   2 Term            1 Term                Review of Systems   Constitutional: Negative for chills and fever.   HENT: Positive for hearing loss. Negative for congestion, ear pain and sore throat.    Eyes: Negative for pain and visual disturbance.   Respiratory: Negative for cough and shortness of breath.    Cardiovascular: Positive for peripheral edema. Negative for chest pain and palpitations.   Gastrointestinal: Negative for abdominal pain, constipation, diarrhea, heartburn, hematochezia and nausea.   Breasts:  Negative for tenderness, breast mass and discharge.   Genitourinary: Positive for genital sores. Negative for dysuria, frequency, hematuria, pelvic pain, urgency, vaginal bleeding and vaginal discharge.   Musculoskeletal: Positive for arthralgias and joint swelling. Negative for myalgias.   Skin: Negative for rash.   Neurological: Negative for dizziness, weakness, headaches and paresthesias.   Psychiatric/Behavioral: Negative for mood changes. The patient is not nervous/anxious.            OBJECTIVE:   /70 (BP Location: Right arm, Patient Position: Chair, Cuff Size: Adult Large)   Pulse 72   Temp 98.6  F (37  C) (Tympanic)   Resp 20   Ht 1.727 m (5' 8\")   Wt 78.9 kg (174 lb)   LMP 04/03/2007   SpO2 96%   Breastfeeding No   BMI 26.46 kg/m    Physical Exam  GENERAL APPEARANCE: healthy, alert and no distress  EYES: Eyes grossly normal to inspection, PERRL and conjunctivae and sclerae normal  HENT: ear canals and TM's normal, nose and mouth without ulcers or lesions, oropharynx clear and oral mucous membranes moist  NECK: no adenopathy, no asymmetry, masses, or scars and thyroid normal to palpation  RESP: lungs clear to auscultation - no rales, rhonchi or wheezes  BREAST: normal without masses, tenderness or nipple discharge and no palpable axillary masses or adenopathy  CV: regular rate and rhythm, normal S1 S2, no S3 or S4, no murmur, click or rub, no " peripheral edema and peripheral pulses strong  ABDOMEN: soft, nontender, no hepatosplenomegaly, no masses and bowel sounds normal   (female): normal female external genitalia, normal urethral meatus, vaginal mucosal atrophy noted, normal cervix, adnexae, and uterus without masses or abnormal discharge. PAP updated today.   MS: no musculoskeletal defects are noted and gait is age appropriate without ataxia. Pain with extension and flexion of right knee against pressure. Varus and valgus testing is negative. Anterior and posterior drawer negative.   SKIN: no suspicious lesions or rashes  NEURO: Normal strength and tone, sensory exam grossly normal, mentation intact and speech normal  PSYCH: mentation appears normal and affect normal/bright    ASSESSMENT/PLAN:       ICD-10-CM    1. Routine general medical examination at a health care facility  Z00.00 Basic metabolic panel  (Ca, Cl, CO2, Creat, Gluc, K, Na, BUN)     Basic metabolic panel  (Ca, Cl, CO2, Creat, Gluc, K, Na, BUN)   2. Cervical cancer screening  Z12.4 Pap Screen with HPV - recommended age 30 - 65 years   3. Chronic pain of right knee  M25.561 XR Knee Right 3 Views    G89.29    4. Screening for lung cancer  Z12.2 CT Chest Lung Cancer Scrn Low Dose wo   5. Former smoker  Z87.891 CT Chest Lung Cancer Scrn Low Dose wo   6. Screening for hyperlipidemia  Z13.220 Lipid panel reflex to direct LDL Non-fasting     Lipid panel reflex to direct LDL Non-fasting   7. Screening for diabetes mellitus  Z13.1 Basic metabolic panel  (Ca, Cl, CO2, Creat, Gluc, K, Na, BUN)     Basic metabolic panel  (Ca, Cl, CO2, Creat, Gluc, K, Na, BUN)     Healthy female exam completed today. Discussed lifestyle modifications including weight loss/ management, eating a balanced diet, and getting regular cardiovascular exercise. Discussed self breast exams and how to complete these. Pap Smear updated today. Abnormal last year. . Labs updated today.    X-ray completed of the right knee mild  "arthritic changes noted no significant bony abnormalities otherwise.  I personally reviewed the x-ray with the patient today.  Recommended symptomatic management.  Offered orthopedic referral and physical therapy declined both at this time.  Discussed exercises that may be beneficial including stability exercises and stretching.   Plan yearly annual physicals or sooner as needed.    Lung Cancer Screening Shared Decision Making Visit     Ayala Wilson is eligible for lung cancer screening on the basis of:   has not not experienced symptoms suggestive of lung cancer.   born on 1960, 62 year old years old.   smoked 1 packs of cigarettes for 30 years for a total of 30 pack-years   has quit smoking 2 years ago.    I have discussed with patient the risks and benefits of screening for lung cancer with low-dose CT.     The risks include:   radiation exposure    false positives     over-diagnosis    The benefit of early detection of lung cancer is contingent upon adherence to annual screening or more frequent follow up if indicated.     Furthermore, reaping the benefits of screening requires Ayala Wilson to be willing and able to undergo diagnostic procedures, if indicated.     We did discuss that the only way to prevent lung cancer is to not smoke. Smoking cessation assistance was not offered.      Patient has been advised of split billing requirements and indicates understanding: Yes    COUNSELING:  Reviewed preventive health counseling, as reflected in patient instructions       Regular exercise       Healthy diet/nutrition       Osteoporosis prevention/bone health    Estimated body mass index is 26.46 kg/m  as calculated from the following:    Height as of this encounter: 1.727 m (5' 8\").    Weight as of this encounter: 78.9 kg (174 lb).        She reports that she has quit smoking. Her smoking use included cigarettes. She has a 30.00 pack-year smoking history. She has never used smokeless " tobacco.      Counseling Resources:  ATP IV Guidelines  Pooled Cohorts Equation Calculator  Breast Cancer Risk Calculator  BRCA-Related Cancer Risk Assessment: FHS-7 Tool  FRAX Risk Assessment  ICSI Preventive Guidelines  Dietary Guidelines for Americans, 2010  USDA's MyPlate  ASA Prophylaxis  Lung CA Screening    Kaylene Drew, ARCHIE CNP  [facility]

## 2022-09-23 ENCOUNTER — HOSPITAL ENCOUNTER (OUTPATIENT)
Dept: CT IMAGING | Facility: CLINIC | Age: 62
Discharge: HOME | End: 2022-09-23
Attending: NURSE PRACTITIONER | Admitting: NURSE PRACTITIONER
Payer: COMMERCIAL

## 2022-09-23 DIAGNOSIS — Z12.2 SCREENING FOR LUNG CANCER: ICD-10-CM

## 2022-09-23 DIAGNOSIS — Z87.891 FORMER SMOKER: ICD-10-CM

## 2022-09-23 LAB
BKR LAB AP GYN ADEQUACY: NORMAL
BKR LAB AP GYN INTERPRETATION: NORMAL
BKR LAB AP HPV REFLEX: NORMAL
BKR LAB AP LMP: NORMAL
BKR LAB AP PREVIOUS ABNL DX: NORMAL
BKR LAB AP PREVIOUS ABNORMAL: NORMAL
PATH REPORT.COMMENTS IMP SPEC: NORMAL
PATH REPORT.COMMENTS IMP SPEC: NORMAL
PATH REPORT.RELEVANT HX SPEC: NORMAL

## 2022-09-23 PROCEDURE — 71271 CT THORAX LUNG CANCER SCR C-: CPT

## 2022-09-27 ENCOUNTER — PATIENT OUTREACH (OUTPATIENT)
Dept: FAMILY MEDICINE | Facility: CLINIC | Age: 62
End: 2022-09-27

## 2022-09-27 ENCOUNTER — HOSPITAL ENCOUNTER (OUTPATIENT)
Dept: MAMMOGRAPHY | Facility: CLINIC | Age: 62
Discharge: HOME | End: 2022-09-27
Attending: NURSE PRACTITIONER | Admitting: NURSE PRACTITIONER
Payer: COMMERCIAL

## 2022-09-27 DIAGNOSIS — Z12.31 VISIT FOR SCREENING MAMMOGRAM: ICD-10-CM

## 2022-09-27 LAB
HUMAN PAPILLOMA VIRUS 16 DNA: NEGATIVE
HUMAN PAPILLOMA VIRUS 18 DNA: NEGATIVE
HUMAN PAPILLOMA VIRUS FINAL DIAGNOSIS: ABNORMAL
HUMAN PAPILLOMA VIRUS OTHER HR: POSITIVE

## 2022-09-27 PROCEDURE — 77067 SCR MAMMO BI INCL CAD: CPT

## 2022-09-27 NOTE — LETTER
September 27, 2022      Ayala FLAHERTY Steve  [address] [place] [address]        Dear ,    This letter is regarding your recent Pap smear and Human Papillomavirus (HPV) test.    Your results showed a normal Pap smear and HPV positive.     About 80 percent of women have been exposed to HPV throughout their lifetime. There is no medication for the treatment of HPV. Typically, your own immune system gets rid of the virus before it does harm. HPV is spread by direct skin-to-skin contact, including sexual intercourse, oral sex, anal sex, or any other contact involving the genital area (example: hand to genital contact). It is not possible to become infected with HPV by touching an object, such as a toilet seat. Most people who are infected with HPV have no signs or symptoms.    Things that you can do to boost your immune system and help your body get rid of HPV: get plenty of rest, eat a well-balanced diet of healthy foods, exercise regularly, decrease stress and if you are a smoker, stop smoking.    It is recommended that you have your next Pap smear and Human Papillomavirus (HPV) test in 1 year.    If you have additional questions regarding this result, please contact our Registered Nurse, Aminata, at [phone].      Sincerely,    Your [facility] Team

## 2023-08-21 ENCOUNTER — PATIENT OUTREACH (OUTPATIENT)
Dept: CARE COORDINATION | Facility: CLINIC | Age: 63
End: 2023-08-21
Payer: MEDICARE

## 2023-08-25 ENCOUNTER — NURSE TRIAGE (OUTPATIENT)
Dept: FAMILY MEDICINE | Facility: CLINIC | Age: 63
End: 2023-08-25
Payer: MEDICARE

## 2023-08-25 NOTE — TELEPHONE ENCOUNTER
Patient calling reports right foot has been hurting down the side of the foot for the past week and a half. No known injury. Denies redness, red streaks or fever. Does not know why the foot hurts. Appointment scheduled 8/29, declines 8/28.     Loren Pineda RN on 8/25/2023 at 8:34 AM    Reason for Disposition    [1] MILD pain (e.g., does not interfere with normal activities) AND [2] present > 7 days    Additional Information    Negative: [1] MODERATE pain (e.g., interferes with normal activities, limping) AND [2] present > 3 days    Negative: [1] Weakness or numbness in foot or toes AND [2] present > 2 weeks    Negative: [1] Tingling in feet AND [2] new or increased    Negative: Pain in the big toe joint    Negative: Looks like a boil, infected sore, or deep ulcer    Negative: [1] Redness of the skin AND [2] no fever    Negative: [1] Swollen foot AND [2] no fever  (Exceptions: localized bump from bunions, calluses, insect bite, sting)    Negative: Weakness (i.e., loss of strength) of new-onset in foot or toes  (Exceptions: not truly weak, foot feels weak because of pain; weakness present > 2 weeks)    Negative: Numbness (i.e., loss of sensation) in foot or toes  (Exception: Just tingling; numbness present > 2 weeks.)    Negative: [1] SEVERE pain (e.g., excruciating, unable to do any normal activities) AND [2] not improved after 2 hours of pain medicine    Negative: [1] Looks infected (spreading redness, pus) AND [2] large red area (> 2 in. or 5 cm)    Negative: [1] Red area or streak AND [2] fever    Negative: [1] Swollen foot AND [2] fever    Negative: Patient sounds very sick or weak to the triager    Negative: Entire foot is cool or blue in comparison to other foot    Negative: Purple or black skin on foot or toe    Negative: Followed a foot injury    Negative: Diabetes mellitus    Negative: Toe pain is main symptom    Negative: Ankle pain is main symptom    Negative: Thigh or calf pain is main  symptom    Protocols used: Foot Pain-A-AH

## 2023-08-29 ENCOUNTER — OFFICE VISIT (OUTPATIENT)
Dept: FAMILY MEDICINE | Facility: CLINIC | Age: 63
End: 2023-08-29
Payer: MEDICARE

## 2023-08-29 ENCOUNTER — ANCILLARY PROCEDURE (OUTPATIENT)
Dept: GENERAL RADIOLOGY | Facility: CLINIC | Age: 63
End: 2023-08-29
Attending: FAMILY MEDICINE
Payer: MEDICARE

## 2023-08-29 VITALS
OXYGEN SATURATION: 97 % | RESPIRATION RATE: 20 BRPM | SYSTOLIC BLOOD PRESSURE: 120 MMHG | HEIGHT: 67 IN | DIASTOLIC BLOOD PRESSURE: 88 MMHG | WEIGHT: 175 LBS | TEMPERATURE: 98.6 F | HEART RATE: 77 BPM | BODY MASS INDEX: 27.47 KG/M2

## 2023-08-29 DIAGNOSIS — M79.671 RIGHT FOOT PAIN: Primary | ICD-10-CM

## 2023-08-29 DIAGNOSIS — Z12.4 CERVICAL CANCER SCREENING: ICD-10-CM

## 2023-08-29 PROCEDURE — 73630 X-RAY EXAM OF FOOT: CPT | Mod: TC | Performed by: RADIOLOGY

## 2023-08-29 PROCEDURE — 99214 OFFICE O/P EST MOD 30 MIN: CPT | Performed by: FAMILY MEDICINE

## 2023-08-29 ASSESSMENT — PAIN SCALES - GENERAL: PAINLEVEL: MODERATE PAIN (5)

## 2023-08-29 NOTE — PROGRESS NOTES
"  Assessment & Plan     Right foot pain  Atraumatic pain that started more than a week ago.  DDx: DJD, occult stress fracture, pathologic fracture, contusion   Patient agreed to imaging to kandis byrde out occult fracture or advanced arthritis.  Discussed with patient absence of overt fracture on imaging. Waiting for radiology report.  Advised topical diclofenac use prn pain.  Activity as tolerated.  Return precautions discussed and given to patient.   - XR Foot Right G/E 3 Views    Cervical cancer screening  Patient was reminded to schedule wellness exam    Patient Instructions   Final radiology report will be called to you in 24 hours.    Possible mild bone bruise or mild foot strain.    Apply 2 inches worth of Voltaren gel to the painful area of the right foot 8 hours apart as needed for pain.    Activity as tolerated.    If not better after 2-3 weeks, contact the care team and update us on our symptom.    If with discoloration, severe pain, swelling or difficulty walking, see a provider in person.    Ba Huerta MD  [facility]    Sera Cai is a 63 year old, presenting for the following health issues:  Foot Pain        8/29/2023     9:50 AM   Additional Questions   Roomed by Kaylene VIERA       History of Present Illness       Reason for visit:  Foot hurt  Symptom onset:  1-2 weeks ago  Symptoms include:  Foot hurt  Symptom intensity:  Moderate  Symptom progression:  Staying the same  Had these symptoms before:  No  What makes it worse:  No  What makes it better:  No    She eats 4 or more servings of fruits and vegetables daily.She consumes 0 sweetened beverage(s) daily.She exercises with enough effort to increase her heart rate 20 to 29 minutes per day.  She exercises with enough effort to increase her heart rate 5 days per week.   She is taking medications regularly.       Concern - right lateral foot pain  Onset: 1.5 weeks ago  Description: \"achy\"; increases if pressure is " "applied  Intensity: mild  Progression of Symptoms:  same  Accompanying Signs & Symptoms: none  Previous history of similar problem: none  Denies direct trauma to the area.  Denies trying on new footwear.  Denies new activities.  Precipitating factors:        Worsened by: putting pressure on it  Alleviating factors:        Improved by: none  Therapies tried and outcome: None      Review of Systems   Constitutional, HEENT, cardiovascular, pulmonary, GI, , musculoskeletal, neuro, skin, endocrine and psych systems are negative, except as otherwise noted.      Objective    /88 (BP Location: Left arm, Patient Position: Chair, Cuff Size: Adult Regular)   Pulse 77   Temp 98.6  F (37  C) (Tympanic)   Resp 20   Ht 1.702 m (5' 7\")   Wt 79.4 kg (175 lb)   LMP 04/03/2007   SpO2 97%   BMI 27.41 kg/m    Body mass index is 27.41 kg/m .  Physical Exam   RIGHT FOOT/ ANKLE: no deformity/discoloration/swelling; mild  TTP on the lateral mid foot at the head of the 5th metatarsal; full range of motion with no pain     Results for orders placed or performed in visit on 08/29/23   XR Foot Right G/E 3 Views     Status: None    Narrative    XR FOOT RIGHT G/E 3 VIEWS 8/29/2023 10:22 AM     HISTORY: unexplained lateral foot pain, worse with pressure; r/o djd  vs stress fracture; Right foot pain    COMPARISON: None.         Impression    IMPRESSION: The right foot is negative for fracture. No dislocation.    ILDEFONSO DERAS MD         SYSTEM ID:  [id number]               "

## 2023-08-29 NOTE — PATIENT INSTRUCTIONS
Final radiology report will be called to you in 24 hours.    Possible mild bone bruise or mild foot strain.    Apply 2 inches worth of Voltaren gel to the painful area of the right foot 8 hours apart as needed for pain.    Activity as tolerated.    If not better after 2-3 weeks, contact the care team and update us on our symptom.    If with discoloration, severe pain, swelling or difficulty walking, see a provider in person.

## 2023-08-30 ENCOUNTER — PATIENT OUTREACH (OUTPATIENT)
Dept: FAMILY MEDICINE | Facility: CLINIC | Age: 63
End: 2023-08-30

## 2023-08-30 NOTE — LETTER
August 30, 2023      Ayala Wilson  [address] [address] [facility] [address]        Dear ,    This letter is to remind you that you are due for your follow-up Pap smear and Human Papillomavirus (HPV) test.    Please call [phone] to schedule your appointment at your earliest convenience.    If you have completed the appointment outside of the [facility] system, please have the records forwarded to our office. We will update your chart for your provider to review before your next annual wellness visit.     Thank you for choosing [facility]!      Sincerely,    Your [facility] Care Team

## 2023-09-26 ENCOUNTER — OFFICE VISIT (OUTPATIENT)
Dept: FAMILY MEDICINE | Facility: CLINIC | Age: 63
End: 2023-09-26
Payer: MEDICARE

## 2023-09-26 ENCOUNTER — LAB (OUTPATIENT)
Dept: LAB | Facility: CLINIC | Age: 63
End: 2023-09-26
Payer: MEDICARE

## 2023-09-26 VITALS
HEART RATE: 76 BPM | WEIGHT: 174.13 LBS | OXYGEN SATURATION: 95 % | DIASTOLIC BLOOD PRESSURE: 80 MMHG | SYSTOLIC BLOOD PRESSURE: 122 MMHG | HEIGHT: 67 IN | BODY MASS INDEX: 27.33 KG/M2 | TEMPERATURE: 98.3 F | RESPIRATION RATE: 18 BRPM

## 2023-09-26 DIAGNOSIS — Z78.0 POST-MENOPAUSAL: ICD-10-CM

## 2023-09-26 DIAGNOSIS — Z13.220 SCREENING FOR HYPERLIPIDEMIA: ICD-10-CM

## 2023-09-26 DIAGNOSIS — Z87.891 PERSONAL HISTORY OF TOBACCO USE: ICD-10-CM

## 2023-09-26 DIAGNOSIS — Z12.4 CERVICAL CANCER SCREENING: ICD-10-CM

## 2023-09-26 DIAGNOSIS — I10 BENIGN ESSENTIAL HYPERTENSION: ICD-10-CM

## 2023-09-26 DIAGNOSIS — Z00.00 ENCOUNTER FOR MEDICARE ANNUAL WELLNESS EXAM: Primary | ICD-10-CM

## 2023-09-26 LAB
ALBUMIN SERPL BCG-MCNC: 4.4 G/DL (ref 3.5–5.2)
ALP SERPL-CCNC: 103 U/L (ref 35–104)
ALT SERPL W P-5'-P-CCNC: 21 U/L (ref 0–50)
ANION GAP SERPL CALCULATED.3IONS-SCNC: 8 MMOL/L (ref 7–15)
AST SERPL W P-5'-P-CCNC: 24 U/L (ref 0–45)
BILIRUB SERPL-MCNC: 0.3 MG/DL
BUN SERPL-MCNC: 23.1 MG/DL (ref 8–23)
CALCIUM SERPL-MCNC: 10.3 MG/DL (ref 8.8–10.2)
CHLORIDE SERPL-SCNC: 103 MMOL/L (ref 98–107)
CHOLEST SERPL-MCNC: 186 MG/DL
CREAT SERPL-MCNC: 0.82 MG/DL (ref 0.51–0.95)
DEPRECATED HCO3 PLAS-SCNC: 29 MMOL/L (ref 22–29)
EGFRCR SERPLBLD CKD-EPI 2021: 79 ML/MIN/1.73M2
GLUCOSE SERPL-MCNC: 84 MG/DL (ref 70–99)
HDLC SERPL-MCNC: 42 MG/DL
LDLC SERPL CALC-MCNC: 92 MG/DL
NONHDLC SERPL-MCNC: 144 MG/DL
POTASSIUM SERPL-SCNC: 4.8 MMOL/L (ref 3.4–5.3)
PROT SERPL-MCNC: 7.1 G/DL (ref 6.4–8.3)
SODIUM SERPL-SCNC: 140 MMOL/L (ref 135–145)
TRIGL SERPL-MCNC: 261 MG/DL

## 2023-09-26 PROCEDURE — G0124 SCREEN C/V THIN LAYER BY MD: HCPCS | Performed by: PATHOLOGY

## 2023-09-26 PROCEDURE — 80053 COMPREHEN METABOLIC PANEL: CPT

## 2023-09-26 PROCEDURE — G0008 ADMIN INFLUENZA VIRUS VAC: HCPCS | Performed by: NURSE PRACTITIONER

## 2023-09-26 PROCEDURE — 36415 COLL VENOUS BLD VENIPUNCTURE: CPT

## 2023-09-26 PROCEDURE — 99213 OFFICE O/P EST LOW 20 MIN: CPT | Mod: 25 | Performed by: NURSE PRACTITIONER

## 2023-09-26 PROCEDURE — G0402 INITIAL PREVENTIVE EXAM: HCPCS | Performed by: NURSE PRACTITIONER

## 2023-09-26 PROCEDURE — G0296 VISIT TO DETERM LDCT ELIG: HCPCS | Performed by: NURSE PRACTITIONER

## 2023-09-26 PROCEDURE — 87624 HPV HI-RISK TYP POOLED RSLT: CPT | Performed by: NURSE PRACTITIONER

## 2023-09-26 PROCEDURE — 90662 IIV NO PRSV INCREASED AG IM: CPT | Performed by: NURSE PRACTITIONER

## 2023-09-26 PROCEDURE — 80061 LIPID PANEL: CPT

## 2023-09-26 ASSESSMENT — ACTIVITIES OF DAILY LIVING (ADL): CURRENT_FUNCTION: NO ASSISTANCE NEEDED

## 2023-09-26 ASSESSMENT — PAIN SCALES - GENERAL: PAINLEVEL: NO PAIN (0)

## 2023-09-26 NOTE — PROGRESS NOTES
SUBJECTIVE:   Ayala is a 63 year old who presents for Preventive Visit.      9/26/2023     9:50 AM   Additional Questions   Roomed by Nette BECERRA CMA   Accompanied by Self       Are you in the first 12 months of your Medicare coverage?  Yes,  Visual Acuity:  Right Eye: 10/25   Left Eye: 10/12.5  Both Eyes: 10/12.5    History of Present Illness       Reason for visit:  Pap and hpv test    She eats 2-3 servings of fruits and vegetables daily.She consumes 0 sweetened beverage(s) daily.She exercises with enough effort to increase her heart rate 10 to 19 minutes per day.  She exercises with enough effort to increase her heart rate 4 days per week.   She is not taking prescribed medications regularly due to Not applicable.  Healthy Habits:     In general, how would you rate your overall health?  Very good    Frequency of exercise:  2-3 days/week    Duration of exercise:  Less than 15 minutes    Taking medications regularly:  Not Applicable    Barriers to taking medications:  Not applicable    Medication side effects:  Not applicable    Ability to successfully perform activities of daily living:  No assistance needed    Home Safety:  No safety concerns identified    Hearing Impairment:  No hearing concerns    In the past 6 months, have you been bothered by leaking of urine?  No    In general, how would you rate your overall mental or emotional health?  Good    Additional concerns today:  Yes      Due for PAP.     Have you ever done Advance Care Planning? (For example, a Health Directive, POLST, or a discussion with a medical provider or your loved ones about your wishes): No, advance care planning information given to patient to review.  Advanced care planning was discussed at today's visit.       Fall risk  Fallen 2 or more times in the past year?: No  Any fall with injury in the past year?: No    Cognitive Screening   1) Repeat 3 items (Leader, Season, Table)    2) Clock draw: NORMAL  3) 3 item recall: Recalls 2 objects    Results: NORMAL clock, 1-2 items recalled: COGNITIVE IMPAIRMENT LESS LIKELY    Mini-CogTM Copyright [employer]. Licensed by the author for use in [facility]; reprinted with permission (adelso@[email or web address]). All rights reserved.      Do you have sleep apnea, excessive snoring or daytime drowsiness? : no    Reviewed and updated as needed this visit by clinical staff   Tobacco  Allergies  Meds  Problems  Med Hx  Surg Hx  Fam Hx          Reviewed and updated as needed this visit by Provider   Tobacco  Allergies  Meds  Problems  Med Hx  Surg Hx  Fam Hx         Social History     Tobacco Use    Smoking status: Former     Packs/day: 1.00     Years: 30.00     Pack years: 30.00     Types: Cigarettes    Smokeless tobacco: Never    Tobacco comments:     pt quit smoking January 5th 2021   Substance Use Topics    Alcohol use: No             9/20/2022     8:27 AM   Alcohol Use   Prescreen: >3 drinks/day or >7 drinks/week? No     Do you have a current opioid prescription? No  Do you use any other controlled substances or medications that are not prescribed by a provider? None        Current providers sharing in care for this patient include:   Patient Care Team:  Kaylene Drew APRN CNP as PCP - General (Nurse Practitioner - Family)  Kaylene Drew APRN CNP as Assigned PCP    The following health maintenance items are reviewed in Epic and correct as of today:  Health Maintenance   Topic Date Due    DEXA  Never done    COVID-19 Vaccine (2 - Booster for Fanny series) 06/02/2021    Pneumococcal Vaccine: 65+ Years (1 - PCV) Never done    LUNG CANCER SCREENING  09/23/2023    PAP FOLLOW-UP  09/20/2023    HPV FOLLOW-UP  09/20/2023    ANNUAL REVIEW OF HM ORDERS  08/29/2024    MEDICARE ANNUAL WELLNESS VISIT  09/26/2024    FALL RISK ASSESSMENT  09/26/2024    MAMMO SCREENING  09/27/2024    DTAP/TDAP/TD IMMUNIZATION (2 - Td or Tdap) 01/10/2027    LIPID  09/20/2027    ADVANCE CARE PLANNING  09/26/2028    COLORECTAL  CANCER SCREENING  08/18/2031    HEPATITIS C SCREENING  Completed    HIV SCREENING  Completed    PHQ-2 (once per calendar year)  Completed    INFLUENZA VACCINE  Completed    ZOSTER IMMUNIZATION  Completed    IPV IMMUNIZATION  Aged Out    HPV IMMUNIZATION  Aged Out    MENINGITIS IMMUNIZATION  Aged Out    PAP  Discontinued     Lab work is in process  Labs reviewed in EPIC  BP Readings from Last 3 Encounters:   09/26/23 122/80   08/29/23 120/88   09/20/22 122/70    Wt Readings from Last 3 Encounters:   09/26/23 79 kg (174 lb 2 oz)   08/29/23 79.4 kg (175 lb)   09/20/22 78.9 kg (174 lb)                  Patient Active Problem List   Diagnosis    Sensorineural hearing loss    Tobacco use disorder    CARDIOVASCULAR SCREENING; LDL GOAL LESS THAN 160    Sinus disorder    Aura    Dizziness    GERD (gastroesophageal reflux disease)    Pustular psoriasis    Mild dysplasia of cervix    Eczema     Past Surgical History:   Procedure Laterality Date    COLONOSCOPY N/A 08/18/2021    Procedure: COLONOSCOPY;  Surgeon: Marvin Mattson DO;  Location: [facility]    SURGICAL HISTORY OF -   1974    appendectomy       Social History     Tobacco Use    Smoking status: Former     Packs/day: 1.00     Years: 30.00     Pack years: 30.00     Types: Cigarettes    Smokeless tobacco: Never    Tobacco comments:     pt quit smoking January 5th 2021   Substance Use Topics    Alcohol use: No     Family History   Problem Relation Age of Onset    Cerebrovascular Disease Mother         CVA    Cancer Father         bone    Gastrointestinal Disease Daughter         crohn's         Current Outpatient Medications   Medication Sig Dispense Refill    diclofenac (VOLTAREN) 1 % topical gel Apply 2 g topically every 8 hours as needed for moderate pain       Allergies   Allergen Reactions    Adalimumab Hives, Itching and Swelling    Dupixent [Dupilumab] Hives, Itching and Swelling    Neosporin [Neomycin-Polymyxin-Gramicidin]     Shellfish-Derived Products      "Zyban [Bupropion Hcl] Rash     Recent Labs   Lab Test 09/20/22  0948 07/13/21  0828 02/05/20  1032 12/09/19  0949 10/08/19  1204 03/04/19  1729 01/14/19  0938 02/12/18  1043 03/02/17  0728   * 100  --   --   --   --   --   --  109   HDL 46* 62  --   --   --   --   --   --  48*   TRIG 212* 164*  --   --   --   --   --   --  135   ALT  --   --  26 24 22   < > 26  --   --    CR 0.86  --  0.69 0.76 0.68   < > 0.72  --   --    GFRESTIMATED 75  --  >90 83 >90   < > >90  --   --    GFRESTBLACK  --   --  >90 >90 >90   < > >90  --   --    POTASSIUM 4.9  --  3.8 4.0 3.9   < > 3.7   < >  --    TSH  --  1.22  --   --   --   --  1.10  --   --     < > = values in this interval not displayed.              7/13/2021     7:33 AM 7/19/2021     3:18 PM   Breast CA Risk Assessment (FHS-7)   Do you have a family history of breast, colon, or ovarian cancer? No / Unknown No / Unknown       Mammogram Screening: Recommended mammography every 1-2 years with patient discussion and risk factor consideration  Pertinent mammograms are reviewed under the imaging tab.    Review of Systems  Constitutional, HEENT, cardiovascular, pulmonary, GI, , musculoskeletal, neuro, skin, endocrine and psych systems are negative, except as otherwise noted.    OBJECTIVE:   /80   Pulse 76   Temp 98.3  F (36.8  C) (Tympanic)   Resp 18   Ht 1.702 m (5' 7\")   Wt 79 kg (174 lb 2 oz)   LMP 04/03/2007   SpO2 95%   BMI 27.27 kg/m   Estimated body mass index is 27.27 kg/m  as calculated from the following:    Height as of this encounter: 1.702 m (5' 7\").    Weight as of this encounter: 79 kg (174 lb 2 oz).  Physical Exam  GENERAL APPEARANCE: healthy, alert and no distress  EYES: Eyes grossly normal to inspection, PERRL and conjunctivae and sclerae normal  HENT: ear canals and TM's normal, nose and mouth without ulcers or lesions, oropharynx clear and oral mucous membranes moist  NECK: no adenopathy, no asymmetry, masses, or scars and thyroid normal " to palpation  RESP: lungs clear to auscultation - no rales, rhonchi or wheezes  BREAST: normal without masses, tenderness or nipple discharge and no palpable axillary masses or adenopathy  CV: regular rate and rhythm, normal S1 S2, no S3 or S4, no murmur, click or rub, no peripheral edema and peripheral pulses strong  ABDOMEN: soft, nontender, no hepatosplenomegaly, no masses and bowel sounds normal  MS: no musculoskeletal defects are noted and gait is age appropriate without ataxia  SKIN: no suspicious lesions or rashes  NEURO: Normal strength and tone, sensory exam grossly normal, mentation intact and speech normal  PSYCH: mentation appears normal and affect normal/bright    ASSESSMENT / PLAN:       ICD-10-CM    1. Encounter for Medicare annual wellness exam  Z00.00       2. Cervical cancer screening  Z12.4 Pap Screen with HPV - recommended age 30 - 65 years      3. Personal history of tobacco use  Z87.891 Prof fee: Shared Decision Making for Lung Cancer Screening     CT Chest Lung Cancer Scrn Low Dose wo      4. Post-menopausal  Z78.0 DEXA HIP/PELVIS/SPINE - Future      5. Screening for hyperlipidemia  Z13.220 Lipid panel reflex to direct LDL Fasting      6. Benign essential hypertension  I10 Comprehensive metabolic panel (BMP + Alb, Alk Phos, ALT, AST, Total. Bili, TP)        Medicare wellness exam completed today overall doing well.  No significant chronic conditions.  Blood pressure is well controlled with diet and lifestyle changes.  She is also a former smoker having quit in the last 2 years with more than a 20-pack-year smoking history.  She will have an annual CT lung screen completed.  Order placed.  She is postmenopausal as well plan DEXA scan.  Additional labs updated as above.    Yearly follow-up or sooner as needed.    Patient has been advised of split billing requirements and indicates understanding: Yes      COUNSELING:  Reviewed preventive health counseling, as reflected in patient instructions      "  Regular exercise       Healthy diet/nutrition       Consider lung cancer screening for ages 55-80 years (77 for Medicare) and 20 pack-year smoking history        BMI:   Estimated body mass index is 27.27 kg/m  as calculated from the following:    Height as of this encounter: 1.702 m (5' 7\").    Weight as of this encounter: 79 kg (174 lb 2 oz).         She reports that she has quit smoking. Her smoking use included cigarettes. She has a 30.00 pack-year smoking history. She has never used smokeless tobacco.      Appropriate preventive services were discussed with this patient, including applicable screening as appropriate for cardiovascular disease, diabetes, osteopenia/osteoporosis, and glaucoma.  As appropriate for age/gender, discussed screening for colorectal cancer, prostate cancer, breast cancer, and cervical cancer. Checklist reviewing preventive services available has been given to the patient.    Reviewed patients plan of care and provided an AVS. The Basic Care Plan (routine screening as documented in Health Maintenance) for Ayala meets the Care Plan requirement. This Care Plan has been established and reviewed with the Patient.          ARCHIE Navarrete [facility]    Identified Health Risks:  I have reviewed Opioid Use Disorder and Substance Use Disorder risk factors and made any needed referrals. Lung Cancer Screening Shared Decision Making Visit     Ayala Wilson, a 63 year old female, is eligible for lung cancer screening    History   Smoking Status    Former    Packs/day: 1.00    Years: 30.00    Types: Cigarettes   Smokeless Tobacco    Never     Comment: pt quit smoking January 5th 2021       I have discussed with patient the risks and benefits of screening for lung cancer with low-dose CT.     The risks include:    radiation exposure: one low dose chest CT has as much ionizing radiation as about 15 chest x-rays, or 6 months of background radiation living " in [place]      false positives: most findings/nodules are NOT cancer, but some might still require additional diagnostic evaluation, including biopsy    over-diagnosis: some slow growing cancers that might never have been clinically significant will be detected and treated unnecessarily     The benefit of early detection of lung cancer is contingent upon adherence to annual screening or more frequent follow up if indicated.     Furthermore, to benefit from screening, Ayala must be willing and able to undergo diagnostic procedures, if indicated. Although no specific guide is available for determining severity of comorbidities, it is reasonable to withhold screening in patients who have greater mortality risk from other diseases.     We did discuss that the best way to prevent lung cancer is to not smoke.    Some patients may value a numeric estimation of lung cancer risk when evaluating if lung cancer screening is right for them, here is one calculator:    ShouldIScreen

## 2023-09-26 NOTE — PATIENT INSTRUCTIONS
Patient Education   Personalized Prevention Plan  You are due for the preventive services outlined below.  Your care team is available to assist you in scheduling these services.  If you have already completed any of these items, please share that information with your care team to update in your medical record.  Health Maintenance Due   Topic Date Due     Osteoporosis Screening  Never done     COVID-19 Vaccine (2 - Booster for Fanny series) 06/02/2021     Pneumococcal Vaccine (1 - PCV) Never done     Flu Vaccine (1) 09/01/2023     LUNG CANCER SCREENING  09/23/2023     PAP  09/20/2023     HPV Follow Up  09/20/2023        Lung Cancer Screening   Frequently Asked Questions  If you are at high-risk for lung cancer, getting screened with low-dose computed tomography (LDCT) every year can help save your life. This handout offers answers to some of the most common questions about lung cancer screening. If you have other questions, please call [phone] ([phone]).     What is it?  Lung cancer screening uses special X-ray technology to create an image of your lung tissue. The exam is quick and easy and takes less than 10 seconds. We don t give you any medicine or use any needles. You can eat before and after the exam. You don t need to change your clothes as long as the clothing on your chest doesn t contain metal. But, you do need to be able to hold your breath for at least 6 seconds during the exam.    What is the goal of lung cancer screening?  The goal of lung cancer screening is to save lives. Many times, lung cancer is not found until a person starts having physical symptoms. Lung cancer screening can help detect lung cancer in the earliest stages when it may be easier to treat.    Who should be screened for lung cancer?  We suggest lung cancer screening for anyone who is at high-risk for lung cancer. You are in the high-risk group if you:      are between the ages of 55 and 79, and    have smoked at  least 1 pack of cigarettes a day for 20 or more years, and    still smoke or have quit within the past 15 years.    However, if you have a new cough or shortness of breath, you should talk to your doctor before being screened.    Why does it matter if I have symptoms?  Certain symptoms can be a sign that you have a condition in your lungs that should be checked and treated by your doctor. These symptoms include fever, chest pain, a new or changing cough, shortness of breath that you have never felt before, coughing up blood or unexplained weight loss. Having any of these symptoms can greatly affect the results of lung cancer screening.       Should all smokers get an LDCT lung cancer screening exam?  It depends. Lung cancer screening is for a very specific group of men and women who have a history of heavy smoking over a long period of time (see  Who should be screened for lung cancer  above).  I am in the high-risk group, but have been diagnosed with cancer in the past. Is LDCT lung cancer screening right for me?  In some cases, you should not have LDCT lung screening, such as when your doctor is already following your cancer with CT scan studies. Your doctor will help you decide if LDCT lung screening is right for you.  Do I need to have a screening exam every year?  Yes. If you are in the high-risk group described earlier, you should get an LDCT lung cancer screening exam every year until you are 79, or are no longer willing or able to undergo screening and possible procedures to diagnose and treat lung cancer.  How effective is LDCT at preventing death from lung cancer?  Studies have shown that LDCT lung cancer screening can lower the risk of death from lung cancer by 20 percent in people who are at high-risk.  What are the risks?  There are some risks and limitations of LDCT lung cancer screening. We want to make sure you understand the risks and benefits, so please let us know if you have any questions. Your  doctor may want to talk with you more about these risks.    Radiation exposure: As with any exam that uses radiation, there is a very small increased risk of cancer. The amount of radiation in LDCT is small--about the same amount a person would get from a mammogram. Your doctor orders the exam when he or she feels the potential benefits outweigh the risks.    False negatives: No test is perfect, including LDCT. It is possible that you may have a medical condition, including lung cancer, that is not found during your exam. This is called a false negative result.    False positives and more testing: LDCT very often finds something in the lung that could be cancer, but in fact is not. This is called a false positive result. False positive tests often cause anxiety. To make sure these findings are not cancer, you may need to have more tests. These tests will be done only if you give us permission. Sometimes patients need a treatment that can have side effects, such as a biopsy. For more information on false positives, see  What can I expect from the results?     Findings not related to lung cancer: Your LDCT exam also takes pictures of areas of your body next to your lungs. In a very small number of cases, the CT scan will show an abnormal finding in one of these areas, such as your kidneys, adrenal glands, liver or thyroid. This finding may not be serious, but you may need more tests. Your doctor can help you decide what other tests you may need, if any.  What can I expect from the results?  About 1 out of 4 LDCT exams will find something that may need more tests. Most of the time, these findings are lung nodules. Lung nodules are very small collections of tissue in the lung. These nodules are very common, and the vast majority--more than 97 percent--are not cancer (benign). Most are normal lymph nodes or small areas of scarring from past infections.  But, if a small lung nodule is found to be cancer, the cancer can be  cured more than 90 percent of the time. To know if the nodule is cancer, we may need to get more images before your next yearly screening exam. If the nodule has suspicious features (for example, it is large, has an odd shape or grows over time), we will refer you to a specialist for further testing.  Will my doctor also get the results?  Yes. Your doctor will get a copy of your results.  Is it okay to keep smoking now that there s a cancer screening exam?  No. Tobacco is one of the strongest cancer-causing agents. It causes not only lung cancer, but other cancers and cardiovascular (heart) diseases as well. The damage caused by smoking builds over time. This means that the longer you smoke, the higher your risk of disease. While it is never too late to quit, the sooner you quit, the better.  Where can I find help to quit smoking?  The best way to prevent lung cancer is to stop smoking. If you have already quit smoking, congratulations and keep it up! For help on quitting smoking, please call [employer] at 7-434-QUITNOW ([phone]) or the American [employer] Society at 1-558.916.8566 to find local resources near you.  One-on-one health coaching:  If you d prefer to work individually with a health care provider on tobacco cessation, we offer:      Medication Therapy Management:  Our specially trained pharmacists work closely with you and your doctor to help you quit smoking.  Call [phone] or [phone] (toll free).

## 2023-09-28 LAB
BKR LAB AP GYN ADEQUACY: ABNORMAL
BKR LAB AP GYN INTERPRETATION: ABNORMAL
BKR LAB AP HPV REFLEX: ABNORMAL
BKR LAB AP PREVIOUS ABNL DX: ABNORMAL
BKR LAB AP PREVIOUS ABNORMAL: ABNORMAL
PATH REPORT.COMMENTS IMP SPEC: ABNORMAL
PATH REPORT.COMMENTS IMP SPEC: ABNORMAL
PATH REPORT.RELEVANT HX SPEC: ABNORMAL

## 2023-10-02 ENCOUNTER — PATIENT OUTREACH (OUTPATIENT)
Dept: FAMILY MEDICINE | Facility: CLINIC | Age: 63
End: 2023-10-02
Payer: MEDICARE

## 2023-10-05 ENCOUNTER — HOSPITAL ENCOUNTER (OUTPATIENT)
Dept: BONE DENSITY | Facility: CLINIC | Age: 63
Discharge: HOME | End: 2023-10-05
Attending: NURSE PRACTITIONER
Payer: MEDICARE

## 2023-10-05 ENCOUNTER — HOSPITAL ENCOUNTER (OUTPATIENT)
Dept: CT IMAGING | Facility: CLINIC | Age: 63
Discharge: HOME | End: 2023-10-05
Attending: NURSE PRACTITIONER
Payer: MEDICARE

## 2023-10-05 DIAGNOSIS — Z78.0 POST-MENOPAUSAL: ICD-10-CM

## 2023-10-05 DIAGNOSIS — Z87.891 PERSONAL HISTORY OF TOBACCO USE: ICD-10-CM

## 2023-10-05 PROCEDURE — 77080 DXA BONE DENSITY AXIAL: CPT

## 2023-10-05 PROCEDURE — 71271 CT THORAX LUNG CANCER SCR C-: CPT

## 2023-10-13 ENCOUNTER — OFFICE VISIT (OUTPATIENT)
Dept: OBGYN | Facility: CLINIC | Age: 63
End: 2023-10-13
Payer: MEDICARE

## 2023-10-13 VITALS
DIASTOLIC BLOOD PRESSURE: 104 MMHG | HEIGHT: 67 IN | RESPIRATION RATE: 18 BRPM | BODY MASS INDEX: 27.89 KG/M2 | WEIGHT: 177.7 LBS | TEMPERATURE: 98.6 F | HEART RATE: 74 BPM | SYSTOLIC BLOOD PRESSURE: 149 MMHG

## 2023-10-13 DIAGNOSIS — R87.612 LGSIL ON PAP SMEAR OF CERVIX: Primary | ICD-10-CM

## 2023-10-13 DIAGNOSIS — N87.0 MILD DYSPLASIA OF CERVIX: ICD-10-CM

## 2023-10-13 PROCEDURE — 88305 TISSUE EXAM BY PATHOLOGIST: CPT | Performed by: PATHOLOGY

## 2023-10-13 PROCEDURE — 57456 ENDOCERV CURETTAGE W/SCOPE: CPT | Performed by: OBSTETRICS & GYNECOLOGY

## 2023-10-13 RX ORDER — IBUPROFEN 200 MG
600 TABLET ORAL EVERY 4 HOURS PRN
COMMUNITY

## 2023-10-13 NOTE — PROGRESS NOTES
Lab Results   Component Value Date    PAP NIL 02/08/2012    PAP NIL 01/21/2011    PAP NIL 12/19/2008    PAP NIL 05/08/2007    PAP NIL 02/27/2006        COLPOSCOPY EXAM - GYN  Indication:    Encounter Diagnoses   Name Primary?    LGSIL on Pap smear of cervix Yes    Mild dysplasia of cervix          Prior pap history and treatment: 2005, 2006, 2007, 2008, 2011, 2012 NIL Paps  7/13/21 LSIL pap, + HR HPV (neg 16/18). [place] due by 10/13/21  9/2/21 [place] Bx Neg, ECC KEVIN 1. Plan cotest in 1 year  9/20/22 NIL pap, +HR HPV (not 16/18). Plan: cotest in 1 year  9/26/23 LSIL pap, + HR HPV (not 16 or 18). Plan: colp by 12/26/23      PHYSICAL EXAM  Gen: alert and oriented, in no apparent distress  PELVIC EXAM  Vulva:  grossly unremarkable  Cervix: normal in appearance, grossly       HIV Status: Negative  Hx of Cervical/Cone Biopsy: yes  Present Form of Contraception:  menopause  Tobacco Use:   Tobacco Use      Smoking status: Former        Packs/day: 1.00        Years: 30.00        Additional pack years: 0.00        Total pack years: 30.00        Types: Cigarettes        Passive exposure: Never      Smokeless tobacco: Never      Tobacco comments: pt quit smoking January 5th 2021       Consent was obtained prior to the procedure.  Patient was placed in dorsal lithotomy position, speculum inserted.  The cervix was prepped with acetic acid and the colposcope focused on the cervix.  The colposcopy was Satisfactory for evaluation    Significant Findings Include:   no visible lesions    Entire squamocolumnar junction visualized: yes  Endocervical Curettage was  performed today.     All specimens were labeled and sent to pathology for pathologic correlation.    The patient tolerated the procedure well  Clinical staff  present for exam.       HPV etiology, epidemiology, transmission and prevention discussed today.  Discussed importance of not smoking and association of smoking and cervical dysplasia.    Colposcopy performed today. Await  final pathology report on tissue submitted.  Patient instructed to notify office if she develops heavy vaginal bleeding (soaking through 1 pad/hour or more), severe pain, fever or foul smelling discharge.  Patient informed that chunky, dark discharge is considered normal if Silver Nitrate was used.  Nothing per vagina; no sex, tampons or douching for 2 days.     She was advised of how long tests would take.    Caitlyn Tran MD ....................  10/13/2023    11:52 AM

## 2023-10-13 NOTE — NURSING NOTE
"Initial BP (!) 149/104 (BP Location: Right arm, Patient Position: Chair, Cuff Size: Adult Regular)   Pulse 74   Temp 98.6  F (37  C) (Tympanic)   Resp 18   Ht 1.702 m (5' 7\")   Wt 80.6 kg (177 lb 11.2 oz)   LMP 04/03/2007   BMI 27.83 kg/m   Estimated body mass index is 27.83 kg/m  as calculated from the following:    Height as of this encounter: 1.702 m (5' 7\").    Weight as of this encounter: 80.6 kg (177 lb 11.2 oz). .    Sahra Flores, WALDEMAR    "

## 2023-10-17 ENCOUNTER — PATIENT OUTREACH (OUTPATIENT)
Dept: OBGYN | Facility: CLINIC | Age: 63
End: 2023-10-17
Payer: MEDICARE

## 2024-04-12 ENCOUNTER — OFFICE VISIT (OUTPATIENT)
Dept: FAMILY MEDICINE | Facility: CLINIC | Age: 64
End: 2024-04-12
Payer: MEDICARE

## 2024-04-12 ENCOUNTER — LAB (OUTPATIENT)
Dept: LAB | Facility: CLINIC | Age: 64
End: 2024-04-12
Payer: MEDICARE

## 2024-04-12 VITALS
HEART RATE: 67 BPM | DIASTOLIC BLOOD PRESSURE: 76 MMHG | SYSTOLIC BLOOD PRESSURE: 128 MMHG | OXYGEN SATURATION: 97 % | TEMPERATURE: 97.6 F | WEIGHT: 177 LBS | BODY MASS INDEX: 27.78 KG/M2 | HEIGHT: 67 IN | RESPIRATION RATE: 16 BRPM

## 2024-04-12 DIAGNOSIS — E04.9 THYROID ENLARGEMENT: ICD-10-CM

## 2024-04-12 DIAGNOSIS — R09.A2 GLOBUS SENSATION: Primary | ICD-10-CM

## 2024-04-12 DIAGNOSIS — R09.A2 GLOBUS SENSATION: ICD-10-CM

## 2024-04-12 DIAGNOSIS — Z87.891 FORMER SMOKER: ICD-10-CM

## 2024-04-12 LAB
ANION GAP SERPL CALCULATED.3IONS-SCNC: 9 MMOL/L (ref 7–15)
BUN SERPL-MCNC: 18.8 MG/DL (ref 8–23)
CALCIUM SERPL-MCNC: 9.4 MG/DL (ref 8.8–10.2)
CHLORIDE SERPL-SCNC: 103 MMOL/L (ref 98–107)
CREAT SERPL-MCNC: 0.74 MG/DL (ref 0.51–0.95)
DEPRECATED HCO3 PLAS-SCNC: 28 MMOL/L (ref 22–29)
EGFRCR SERPLBLD CKD-EPI 2021: 89 ML/MIN/1.73M2
ERYTHROCYTE [DISTWIDTH] IN BLOOD BY AUTOMATED COUNT: 13 % (ref 10–15)
GLUCOSE SERPL-MCNC: 93 MG/DL (ref 70–99)
HCT VFR BLD AUTO: 44.8 % (ref 35–47)
HGB BLD-MCNC: 14.6 G/DL (ref 11.7–15.7)
MCH RBC QN AUTO: 29.1 PG (ref 26.5–33)
MCHC RBC AUTO-ENTMCNC: 32.6 G/DL (ref 31.5–36.5)
MCV RBC AUTO: 89 FL (ref 78–100)
PLATELET # BLD AUTO: 303 10E3/UL (ref 150–450)
POTASSIUM SERPL-SCNC: 4.3 MMOL/L (ref 3.4–5.3)
RBC # BLD AUTO: 5.01 10E6/UL (ref 3.8–5.2)
SODIUM SERPL-SCNC: 140 MMOL/L (ref 135–145)
TSH SERPL DL<=0.005 MIU/L-ACNC: 1.59 UIU/ML (ref 0.3–4.2)
WBC # BLD AUTO: 5.5 10E3/UL (ref 4–11)

## 2024-04-12 PROCEDURE — 80048 BASIC METABOLIC PNL TOTAL CA: CPT

## 2024-04-12 PROCEDURE — 99214 OFFICE O/P EST MOD 30 MIN: CPT | Performed by: NURSE PRACTITIONER

## 2024-04-12 PROCEDURE — 36415 COLL VENOUS BLD VENIPUNCTURE: CPT

## 2024-04-12 PROCEDURE — 84443 ASSAY THYROID STIM HORMONE: CPT

## 2024-04-12 PROCEDURE — 85027 COMPLETE CBC AUTOMATED: CPT

## 2024-04-12 RX ORDER — RESPIRATORY SYNCYTIAL VIRUS VACCINE 120MCG/0.5
0.5 KIT INTRAMUSCULAR ONCE
Qty: 1 EACH | Refills: 0 | Status: CANCELLED | OUTPATIENT
Start: 2024-04-12 | End: 2024-04-12

## 2024-04-12 ASSESSMENT — PAIN SCALES - GENERAL: PAINLEVEL: NO PAIN (0)

## 2024-04-12 NOTE — PROGRESS NOTES
"  Assessment & Plan     Globus sensation  Suspect that symptoms are secondary to reflux.  Recommend starting with omeprazole will check labs today.  If no symptom improvement would consider an EGD or further imaging if symptoms change.  She is in agreement this plan  - CBC with platelets; Future  - Basic metabolic panel  (Ca, Cl, CO2, Creat, Gluc, K, Na, BUN); Future  - omeprazole (PRILOSEC) 20 MG DR capsule; Take 1 capsule (20 mg) by mouth daily Okay to increase to two times daily if needed for symptom management    Former smoker  Previous smoker of 40 years quit 5 years ago.    Thyroid enlargement  Further evaluation warranted.  TSH to be checked ultrasound ordered.  - TSH with free T4 reflex; Future  - US Thyroid; Future          BMI  Estimated body mass index is 27.72 kg/m  as calculated from the following:    Height as of this encounter: 1.702 m (5' 7\").    Weight as of this encounter: 80.3 kg (177 lb).       Sera Cai is a 64 year old, presenting for the following health issues:  Throat Problem        4/12/2024     8:49 AM   Additional Questions   Roomed by AVRIL Nesbitt       Concern - Throat irritation  Onset: A few weeks ago  Description: Scratchy sensation, like something is stuck in her throat. Will cough due to irritation. No cold symptoms. Unsure if it's reflux related.   Intensity: moderate  Progression of Symptoms:  same  Accompanying Signs & Symptoms: None  Previous history of similar problem: None  Precipitating factors:        Worsened by: Drinking carbonated drinks  Alleviating factors:        Improved by: Cough drops, taking pepto   Therapies tried and outcome: See above    Has some reflux with carbonated beverages. History of smoking. Quit smoking 5 years ago. Newer symptom of globus sensation in her throat with some scratchiness. Denies any change in voice.         Review of Systems  Constitutional, HEENT, cardiovascular, pulmonary, gi and gu systems are negative, except as " "otherwise noted.      Objective    /76   Pulse 67   Temp 97.6  F (36.4  C) (Tympanic)   Resp 16   Ht 1.702 m (5' 7\")   Wt 80.3 kg (177 lb)   LMP 04/03/2007   SpO2 97%   BMI 27.72 kg/m    Body mass index is 27.72 kg/m .  Physical Exam   GENERAL: alert and no distress  NECK: no adenopathy, no asymmetry, masses, or scars.  Possible thyroid enlargement present.  MOUTH: Posterior oropharynx within normal limits no specific tonsillar hypertrophy.  No patches or skin changes noted.  Full denture on top.  RESP: lungs clear to auscultation - no rales, rhonchi or wheezes  CV: regular rate and rhythm, normal S1 S2, no S3 or S4, no murmur, click or rub, no peripheral edema  ABDOMEN: soft, nontender, no hepatosplenomegaly, no masses and bowel sounds normal  MS: no gross musculoskeletal defects noted, no edema          Signed Electronically by: ARCHIE Navarrete CNP    "

## 2024-04-12 NOTE — PATIENT INSTRUCTIONS
Start 600mg calcium two times daily  Take Vit D [id number] international unit(s)    Let me know if symptoms are not improving with the omeprazole. If still symptomatic in the next 3-4 weeks I would like to schedule an EGD.     I am ordering a thyroid ultrasound to evaluate this further as well. It continues to be mildly enlarged.     Labs pending, I will let you know results. A letter will be mailed to you. If needed a phone call to talk about findings.

## 2024-04-15 ENCOUNTER — TELEPHONE (OUTPATIENT)
Dept: FAMILY MEDICINE | Facility: CLINIC | Age: 64
End: 2024-04-15
Payer: MEDICARE

## 2024-04-15 NOTE — TELEPHONE ENCOUNTER
Forms/Letter Request    Type of form/letter: OTHER: [place] D[place] Loss of Consciousness Form - Pt states that she does not take seizure med and has never loss consciousness.      Do we have the form/letter: Yes:     Who is the form from? Patient    Where did/will the form come from? Patient or family brought in       When is form/letter needed by: ?    How would you like the form/letter returned: Fax : [phone] and mail original copy to pt's home address    Patient Notified form requests are processed in 5-7 business days:Yes    Okay to leave a detailed message?: Yes at Cell number on file:    Telephone Information:   Mobile [phone]

## 2024-04-25 ENCOUNTER — HOSPITAL ENCOUNTER (OUTPATIENT)
Dept: ULTRASOUND IMAGING | Facility: CLINIC | Age: 64
Discharge: HOME | End: 2024-04-25
Attending: NURSE PRACTITIONER | Admitting: NURSE PRACTITIONER
Payer: MEDICARE

## 2024-04-25 DIAGNOSIS — E04.9 THYROID ENLARGEMENT: ICD-10-CM

## 2024-04-25 PROCEDURE — 76536 US EXAM OF HEAD AND NECK: CPT

## 2024-04-26 ENCOUNTER — TELEPHONE (OUTPATIENT)
Dept: FAMILY MEDICINE | Facility: CLINIC | Age: 64
End: 2024-04-26
Payer: MEDICARE

## 2024-04-26 DIAGNOSIS — E04.2 MULTINODULAR THYROID: Primary | ICD-10-CM

## 2024-04-26 NOTE — TELEPHONE ENCOUNTER
Recent ultrasound of thyroid shows multi nodule thyroid.  Recommend FNA of nodule labeled #5 based on size.  Remainder of nodules to be monitored yearly.  Discussed this with patient order placed.

## 2024-05-23 ENCOUNTER — HOSPITAL ENCOUNTER (OUTPATIENT)
Dept: ULTRASOUND IMAGING | Facility: CLINIC | Age: 64
Discharge: HOME | End: 2024-05-23
Attending: NURSE PRACTITIONER | Admitting: NURSE PRACTITIONER
Payer: MEDICARE

## 2024-05-23 VITALS — SYSTOLIC BLOOD PRESSURE: 146 MMHG | DIASTOLIC BLOOD PRESSURE: 98 MMHG

## 2024-05-23 DIAGNOSIS — E04.2 MULTINODULAR THYROID: ICD-10-CM

## 2024-05-23 PROCEDURE — 250N000009 HC RX 250: Performed by: RADIOLOGY

## 2024-05-23 PROCEDURE — 10005 FNA BX W/US GDN 1ST LES: CPT

## 2024-05-23 PROCEDURE — 88173 CYTOPATH EVAL FNA REPORT: CPT | Mod: TC | Performed by: NURSE PRACTITIONER

## 2024-05-23 RX ADMIN — LIDOCAINE HYDROCHLORIDE 4 ML: 10 INJECTION, SOLUTION EPIDURAL; INFILTRATION; INTRACAUDAL; PERINEURAL at 13:01

## 2024-05-30 LAB
PATH REPORT.COMMENTS IMP SPEC: NORMAL
PATH REPORT.COMMENTS IMP SPEC: NORMAL
PATH REPORT.FINAL DX SPEC: NORMAL
PATH REPORT.GROSS SPEC: NORMAL
PATH REPORT.MICROSCOPIC SPEC OTHER STN: NORMAL
PATH REPORT.RELEVANT HX SPEC: NORMAL

## 2024-05-30 PROCEDURE — 88173 CYTOPATH EVAL FNA REPORT: CPT | Mod: 26

## 2024-06-12 ENCOUNTER — APPOINTMENT (OUTPATIENT)
Dept: GENERAL RADIOLOGY | Facility: CLINIC | Age: 64
End: 2024-06-12
Payer: MEDICARE

## 2024-06-12 ENCOUNTER — NURSE TRIAGE (OUTPATIENT)
Dept: FAMILY MEDICINE | Facility: CLINIC | Age: 64
End: 2024-06-12
Payer: MEDICARE

## 2024-06-12 ENCOUNTER — HOSPITAL ENCOUNTER (EMERGENCY)
Facility: CLINIC | Age: 64
Discharge: HOME | End: 2024-06-12
Payer: MEDICARE

## 2024-06-12 VITALS
HEART RATE: 97 BPM | OXYGEN SATURATION: 94 % | TEMPERATURE: 99.2 F | SYSTOLIC BLOOD PRESSURE: 132 MMHG | DIASTOLIC BLOOD PRESSURE: 86 MMHG | RESPIRATION RATE: 16 BRPM

## 2024-06-12 DIAGNOSIS — K59.00 CONSTIPATION: ICD-10-CM

## 2024-06-12 PROCEDURE — 74019 RADEX ABDOMEN 2 VIEWS: CPT

## 2024-06-12 PROCEDURE — G0463 HOSPITAL OUTPT CLINIC VISIT: HCPCS

## 2024-06-12 PROCEDURE — 99213 OFFICE O/P EST LOW 20 MIN: CPT

## 2024-06-12 ASSESSMENT — COLUMBIA-SUICIDE SEVERITY RATING SCALE - C-SSRS
2. HAVE YOU ACTUALLY HAD ANY THOUGHTS OF KILLING YOURSELF IN THE PAST MONTH?: NO
1. IN THE PAST MONTH, HAVE YOU WISHED YOU WERE DEAD OR WISHED YOU COULD GO TO SLEEP AND NOT WAKE UP?: NO
6. HAVE YOU EVER DONE ANYTHING, STARTED TO DO ANYTHING, OR PREPARED TO DO ANYTHING TO END YOUR LIFE?: NO

## 2024-06-12 ASSESSMENT — ACTIVITIES OF DAILY LIVING (ADL)
ADLS_ACUITY_SCORE: 35
ADLS_ACUITY_SCORE: 35

## 2024-06-12 NOTE — ED TRIAGE NOTES
Pt presents with concern for constipation, took duclax yesterday and today, other OTC with no luck.  Symptoms for 2 days

## 2024-06-12 NOTE — TELEPHONE ENCOUNTER
"Reason for Disposition   Abdomen is more swollen than usual   Leaking stool    Additional Information   Negative: Abdomen pain is main symptom and male   Negative: Abdomen pain is main symptom and female   Negative: Rectal bleeding or blood in stool is main symptom   Negative: Vomiting bile (green color)   Negative: Patient sounds very sick or weak to the triager   Negative: Constant abdominal pain lasting > 2 hours   Negative: Vomiting and abdomen looks much more swollen than usual    Answer Assessment - Initial Assessment Questions  1. STOOL PATTERN OR FREQUENCY: \"How often do you have a bowel movement (BM)?\"  (Normal range: 3 times a day to every 3 days)  \"When was your last BM?\"        Every 2-3 days   2. STRAINING: \"Do you have to strain to have a BM?\"       Patient has to strain to go and only has small amount come out  3. RECTAL PAIN: \"Does your rectum hurt when the stool comes out?\" If Yes, ask: \"Do you have hemorrhoids? How bad is the pain?\"  (Scale 1-10; or mild, moderate, severe)      Yes rectum hurts, 1-2 out of 10.    4. STOOL COMPOSITION: \"Are the stools hard?\"       Yes the are hard  5. BLOOD ON STOOLS: \"Has there been any blood on the toilet tissue or on the surface of the BM?\" If Yes, ask: \"When was the last time?\"     No   6. CHRONIC CONSTIPATION: \"Is this a new problem for you?\"  If No, ask: \"How long have you had this problem?\" (days, weeks, months)       no  7. CHANGES IN DIET OR HYDRATION: \"Have there been any recent changes in your diet?\" \"How much fluids are you drinking on a daily basis?\"  \"How much have you had to drink today?\"      Maybe due to her tooth pain after a tooth extraction and she is not eating as much for the past week.   8. MEDICINES: \"Have you been taking any new medicines?\" \"Are you taking any narcotic pain medicines?\" (e.g., Dilaudid, morphine, Percocet, Vicodin)      Patient was started on an antibiotic today from dentist due to dry sockets  9. LAXATIVES: \"Have you been " "using any stool softeners, laxatives, or enemas?\"  If Yes, ask \"What, how often, and when was the last time?\"      Dulcolax 100 mg; she took 1 capsule two times yesterday and one today without results  10. ACTIVITY:  \"How much walking do you do every day?\"  \"Has your activity level decreased in the past week?\"         Patient walks 15 min of quick walking   11. CAUSE: \"What do you think is causing the constipation?\"         No idea  12. OTHER SYMPTOMS: \"Do you have any other symptoms?\" (e.g., abdomen pain, bloating, fever, vomiting)        Stomach feels full, bloated, distended abdomin  13. MEDICAL HISTORY: \"Do you have a history of hemorrhoids, rectal fissures, or rectal surgery or rectal abscess?\"          no    Protocols used: Constipation-A-[place]    Patient going to [facility] due to no available appointments today to be evaluated.    Katy Dennis RN on 6/12/2024 at 5:13 PM     "

## 2024-06-12 NOTE — TELEPHONE ENCOUNTER
Symptoms    Describe your symptoms: Pt has been constipated X 3 days.  Pt took 2 Dulcolax yesterday, but no results.  Please call patient and advise.      Any pain: Yes:     How long have you been having symptoms: 3  days    Have you been seen for this:  No    Appointment offered?: No    Triage offered?: Yes:     Home remedies tried: Ducolux    Preferred Pharmacy:   [facility]   [address] [facility] [address]  Phone: [phone] Fax: [phone]    Could we send this information to you in [employer] or would you prefer to receive a phone call?:   Patient would prefer a phone call   Okay to leave a detailed message?: Yes at Cell number on file:    Telephone Information:   Mobile [phone]

## 2024-06-26 NOTE — ED PROVIDER NOTES
History   No chief complaint on file.    HPI  Ayala Wilson is a 64 year old female who presents to  with CC of constipation. Pt states she has not had a bowel movement for 4 days. She feels bloated. She took Dulcolax 24 hours ago and this am with no BM. Denies abdominal pain, fever, chills, hx abdominal surgery.     Allergies:  Allergies   Allergen Reactions    Adalimumab Hives, Itching and Swelling    Dupixent [Dupilumab] Hives, Itching and Swelling    Neosporin [Neomycin-Polymyxin-Gramicidin]     Shellfish-Derived Products     Zyban [Bupropion Hcl] Rash       Problem List:    Patient Active Problem List    Diagnosis Date Noted    Mild dysplasia of cervix 07/21/2021     Priority: Medium     2005, 2006, 2007, 2008, 2011, 2012 NIL Paps  7/13/21 LSIL pap, + HR HPV (neg 16/18). Gum [place] due by 10/13/21  9/2/21 Gum Spring Bx Neg, ECC KEVIN 1. Plan cotest in 1 year  9/20/22 NIL pap, +HR HPV (not 16/18). Plan: cotest in 1 year  9/26/23 LSIL pap, + HR HPV (not 16 or 18). Plan: colp by 12/26/23  10/13/23 Colpo ECC scant but negative. 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 Provider to review          Past Medical History:    Past Medical History:   Diagnosis Date    Abnormal Pap smear of cervix 07/13/2021    Cervical high risk HPV (human papillomavirus) test positive 07/13/2021    Contact dermatitis and other eczema, due to unspecified cause     Tobacco use disorder        Past Surgical  History:    Past Surgical History:   Procedure Laterality Date    COLONOSCOPY N/A 08/18/2021    Procedure: COLONOSCOPY;  Surgeon: Marvin Mattson DO;  Location: [facility]    SURGICAL HISTORY OF -   1974    appendectomy       Family History:    Family History   Problem Relation Age of Onset    Cerebrovascular Disease Mother         CVA    Cancer Father         bone    Gastrointestinal Disease Daughter         crohn's       Social History:  Marital Status:   [4]  Social History     Tobacco Use    Smoking status: Former     Current packs/day: 1.00     Average packs/day: 1 pack/day for 30.0 years (30.0 ttl pk-yrs)     Types: Cigarettes     Passive exposure: Never    Smokeless tobacco: Never    Tobacco comments:     pt quit smoking January 5th 2021   Vaping Use    Vaping status: Never Used   Substance Use Topics    Alcohol use: No    Drug use: No        Medications:    magnesium hydroxide (MILK OF MAGNESIA) 400 MG/5ML suspension  diclofenac (VOLTAREN) 1 % topical gel  ibuprofen (ADVIL/MOTRIN) 200 MG tablet  omeprazole (PRILOSEC) 20 MG DR capsule          Review of Systems   All other systems reviewed and are negative.      Physical Exam   BP: 132/86  Pulse: 97  Temp: 99.2  F (37.3  C)  Resp: 16  SpO2: 94 %      Physical Exam  Vitals and nursing note reviewed.   Constitutional:       General: She is not in acute distress.     Appearance: She is not toxic-appearing.   Cardiovascular:      Rate and Rhythm: Normal rate.      Pulses: Normal pulses.   Pulmonary:      Effort: Pulmonary effort is normal.   Abdominal:      General: There is distension.      Palpations: There is no mass.      Tenderness: There is no abdominal tenderness. There is no guarding or rebound.      Hernia: No hernia is present.      Comments: Hyperactive bowel sounds.    Skin:     Findings: No bruising.   Neurological:      Mental Status: She is alert.      Motor: No weakness.   Psychiatric:         Mood and Affect: Mood normal.          Behavior: Behavior normal.         ED Course        No results found for this or any previous visit (from the past 24 hour(s)).    Medications - No data to display    Assessments & Plan (with Medical Decision Making)     I have reviewed the nursing notes.    I have reviewed the findings, diagnosis, plan and need for follow up with the patient.        Medical Decision Making  Pt is a 64 year old female who presents to  with CC of constipation. Pt states she has not had a bowel movement for 4 days. She feels bloated. She took Dulcolax 24 hours ago and this am with no BM. Denies abdominal pain, fever, chills, hx abdominal surgery.     Exam above. Pt in no acute distress. Abdomen is nontender. Hyperactive bowel sounds present.     Abdominal XR obtained and reveals: The bowel gas pattern is abnormal but nonspecific and not suggestive of a high-grade obstruction. Moderate stool burden in the colon. No free air. No pathologic calcification. Degenerative changes of the spine and hips.     I discussed treatment options with pt and we decided on outpatient management with close follow-up. Take 1 dose of milk of mag this evening. Follow-up in ER in 24 hours if no bowel movement.    Pt agreeable to plan and discharged home.         Discharge Medication List as of 6/12/2024  7:30 PM        START taking these medications    Details   magnesium hydroxide (MILK OF MAGNESIA) 400 MG/5ML suspension Take 30 mLs by mouth daily as needed for constipation or heartburn, Disp-118 mL, R-0, E-Prescribe             Final diagnoses:   Constipation       6/12/2024   [facility] EMERGENCY DEPT       Elle Paiz PA-C  06/26/24 [id number]

## 2024-09-05 ENCOUNTER — OFFICE VISIT (OUTPATIENT)
Dept: FAMILY MEDICINE | Facility: CLINIC | Age: 64
End: 2024-09-05
Payer: MEDICARE

## 2024-09-05 VITALS
HEIGHT: 67 IN | TEMPERATURE: 98.7 F | SYSTOLIC BLOOD PRESSURE: 122 MMHG | WEIGHT: 177 LBS | RESPIRATION RATE: 20 BRPM | OXYGEN SATURATION: 96 % | DIASTOLIC BLOOD PRESSURE: 80 MMHG | HEART RATE: 74 BPM | BODY MASS INDEX: 27.78 KG/M2

## 2024-09-05 DIAGNOSIS — Z12.31 VISIT FOR SCREENING MAMMOGRAM: ICD-10-CM

## 2024-09-05 DIAGNOSIS — D17.30 LIPOMA OF SKIN AND SUBCUTANEOUS TISSUE: Primary | ICD-10-CM

## 2024-09-05 DIAGNOSIS — R09.A2 GLOBUS SENSATION: ICD-10-CM

## 2024-09-05 PROCEDURE — 99213 OFFICE O/P EST LOW 20 MIN: CPT | Mod: 25 | Performed by: NURSE PRACTITIONER

## 2024-09-05 PROCEDURE — 90662 IIV NO PRSV INCREASED AG IM: CPT | Performed by: NURSE PRACTITIONER

## 2024-09-05 PROCEDURE — 90471 IMMUNIZATION ADMIN: CPT | Performed by: NURSE PRACTITIONER

## 2024-09-05 ASSESSMENT — PAIN SCALES - GENERAL: PAINLEVEL: NO PAIN (0)

## 2024-09-05 NOTE — PROGRESS NOTES
"  Assessment & Plan     Lipoma of skin and subcutaneous tissue  Reassurance provided today. Recommend monitoring. If worsening will recommend imaging to further evaluate.     Visit for screening mammogram  - MA Screening Bilateral w/ Cyrus; Future    Globus sensation  Encouraged to take on a regular basis. Refill provided of omeprazole. If not improving will consider an EGD.   - omeprazole (PRILOSEC) 20 MG DR capsule; Take 1 capsule (20 mg) by mouth daily. Okay to increase to two times daily if needed for symptom management      BMI  Estimated body mass index is 27.72 kg/m  as calculated from the following:    Height as of this encounter: 1.702 m (5' 7\").    Weight as of this encounter: 80.3 kg (177 lb).         Sera Cai is a 64 year old, presenting for the following health issues:  Lesion (Small lumps under skin on left forearm. Not bothersome, just noticeable. ) and Results (Had a thyroid biopsy on 5/23/2024 and would like to discuss )        9/5/2024     1:07 PM   Additional Questions   Roomed by Katherine Palacio CMA     History of Present Illness       Reason for visit:  Bumps on my left arm  Symptom onset:  3-4 weeks ago  Symptoms include:  None expect i can see them  Symptom intensity:  Mild  Symptom progression:  Worsening  Had these symptoms before:  No  What makes it worse:  No  What makes it better:  No She is missing 2 dose(s) of medications per week.     Chief Complaint   Patient presents with    Lesion     Small lumps under skin on left forearm. Not bothersome, just noticeable.     Results     Had a thyroid biopsy on 5/23/2024 and would like to discuss      Non bothersome, just noticed.     She agrees to orders for flu and mammogram.     Reassured / reviewed results of the thyroid biopsy. These were benign. She continues to notice throat symptoms \"it feels like it is on fire sometimes\" remembers to take the omeprazole 50% of the time.       Review of Systems  Constitutional, neuro, ENT, " "endocrine, pulmonary, cardiac, gastrointestinal, genitourinary, musculoskeletal, integument and psychiatric systems are negative, except as otherwise noted.      Objective    /80   Pulse 74   Temp 98.7  F (37.1  C) (Tympanic)   Resp 20   Ht 1.702 m (5' 7\")   Wt 80.3 kg (177 lb)   LMP 04/03/2007   SpO2 96%   BMI 27.72 kg/m    Body mass index is 27.72 kg/m .  Physical Exam   GENERAL: alert and no distress  NECK: no adenopathy, no asymmetry, masses, or scars  RESP: lungs clear to auscultation - no rales, rhonchi or wheezes  CV: regular rate and rhythm, normal S1 S2, no S3 or S4, no murmur, click or rub, no peripheral edema  MS: no gross musculoskeletal defects noted, no edema  SKIN: no suspicious lesions or rashes and quarter size and nickel sized soft, mobile spongy subcutaneous cysts present on left forearm. Likely lipoma.   PSYCH: mentation appears normal, affect normal/bright            Signed Electronically by: ARCHIE Navarrete CNP    "

## 2024-10-15 ENCOUNTER — OFFICE VISIT (OUTPATIENT)
Dept: FAMILY MEDICINE | Facility: CLINIC | Age: 64
End: 2024-10-15
Payer: MEDICARE

## 2024-10-15 VITALS
SYSTOLIC BLOOD PRESSURE: 138 MMHG | DIASTOLIC BLOOD PRESSURE: 72 MMHG | HEIGHT: 67 IN | HEART RATE: 74 BPM | WEIGHT: 178.4 LBS | RESPIRATION RATE: 16 BRPM | TEMPERATURE: 97.1 F | OXYGEN SATURATION: 97 % | BODY MASS INDEX: 28 KG/M2

## 2024-10-15 DIAGNOSIS — Z12.2 SCREENING FOR LUNG CANCER: ICD-10-CM

## 2024-10-15 DIAGNOSIS — R87.612 LGSIL ON PAP SMEAR OF CERVIX: ICD-10-CM

## 2024-10-15 DIAGNOSIS — Z12.4 CERVICAL CANCER SCREENING: ICD-10-CM

## 2024-10-15 DIAGNOSIS — Z00.00 ENCOUNTER FOR MEDICARE ANNUAL WELLNESS EXAM: Primary | ICD-10-CM

## 2024-10-15 DIAGNOSIS — Z87.42 HISTORY OF ABNORMAL CERVICAL PAP SMEAR: ICD-10-CM

## 2024-10-15 DIAGNOSIS — R87.811 VAGINAL HIGH RISK HPV DNA TEST POSITIVE: ICD-10-CM

## 2024-10-15 DIAGNOSIS — F41.9 ANXIETY: ICD-10-CM

## 2024-10-15 DIAGNOSIS — N87.0 MILD DYSPLASIA OF CERVIX: ICD-10-CM

## 2024-10-15 DIAGNOSIS — Z87.891 PERSONAL HISTORY OF NICOTINE DEPENDENCE: ICD-10-CM

## 2024-10-15 DIAGNOSIS — E78.5 HYPERLIPIDEMIA LDL GOAL <100: ICD-10-CM

## 2024-10-15 LAB
CHOLEST SERPL-MCNC: 198 MG/DL
FASTING STATUS PATIENT QL REPORTED: NO
HDLC SERPL-MCNC: 44 MG/DL
LDLC SERPL CALC-MCNC: 90 MG/DL
NONHDLC SERPL-MCNC: 154 MG/DL
TRIGL SERPL-MCNC: 318 MG/DL

## 2024-10-15 PROCEDURE — 87624 HPV HI-RISK TYP POOLED RSLT: CPT | Mod: GA | Performed by: NURSE PRACTITIONER

## 2024-10-15 PROCEDURE — 36415 COLL VENOUS BLD VENIPUNCTURE: CPT | Performed by: NURSE PRACTITIONER

## 2024-10-15 PROCEDURE — 88175 CYTOPATH C/V AUTO FLUID REDO: CPT | Performed by: NURSE PRACTITIONER

## 2024-10-15 PROCEDURE — G0438 PPPS, INITIAL VISIT: HCPCS | Performed by: NURSE PRACTITIONER

## 2024-10-15 PROCEDURE — 99214 OFFICE O/P EST MOD 30 MIN: CPT | Mod: 25 | Performed by: NURSE PRACTITIONER

## 2024-10-15 PROCEDURE — 80061 LIPID PANEL: CPT | Performed by: NURSE PRACTITIONER

## 2024-10-15 RX ORDER — ESCITALOPRAM OXALATE 5 MG/1
5 TABLET ORAL DAILY
Qty: 90 TABLET | Refills: 0 | Status: SHIPPED | OUTPATIENT
Start: 2024-10-15

## 2024-10-15 RX ORDER — SIMVASTATIN 20 MG
20 TABLET ORAL AT BEDTIME
Qty: 90 TABLET | Refills: 3 | Status: SHIPPED | OUTPATIENT
Start: 2024-10-15

## 2024-10-15 SDOH — HEALTH STABILITY: PHYSICAL HEALTH: ON AVERAGE, HOW MANY DAYS PER WEEK DO YOU ENGAGE IN MODERATE TO STRENUOUS EXERCISE (LIKE A BRISK WALK)?: 3 DAYS

## 2024-10-15 ASSESSMENT — ANXIETY QUESTIONNAIRES
GAD7 TOTAL SCORE: 12
6. BECOMING EASILY ANNOYED OR IRRITABLE: MORE THAN HALF THE DAYS
1. FEELING NERVOUS, ANXIOUS, OR ON EDGE: NEARLY EVERY DAY
5. BEING SO RESTLESS THAT IT IS HARD TO SIT STILL: SEVERAL DAYS
4. TROUBLE RELAXING: SEVERAL DAYS
7. FEELING AFRAID AS IF SOMETHING AWFUL MIGHT HAPPEN: SEVERAL DAYS
2. NOT BEING ABLE TO STOP OR CONTROL WORRYING: MORE THAN HALF THE DAYS
3. WORRYING TOO MUCH ABOUT DIFFERENT THINGS: MORE THAN HALF THE DAYS
GAD7 TOTAL SCORE: 12

## 2024-10-15 ASSESSMENT — PATIENT HEALTH QUESTIONNAIRE - PHQ9: SUM OF ALL RESPONSES TO PHQ QUESTIONS 1-9: 4

## 2024-10-15 ASSESSMENT — SOCIAL DETERMINANTS OF HEALTH (SDOH): HOW OFTEN DO YOU GET TOGETHER WITH FRIENDS OR RELATIVES?: MORE THAN THREE TIMES A WEEK

## 2024-10-15 ASSESSMENT — PAIN SCALES - GENERAL: PAINLEVEL: NO PAIN (0)

## 2024-10-15 NOTE — PROGRESS NOTES
"Preventive Care Visit  [facility]  ARCHIE Navarrete CNP, Nurse Practitioner - Family  Oct 15, 2024      Assessment & Plan     Encounter for Medicare annual wellness exam  Medicare wellness exam completed today overall doing well chronic conditions reviewed.    Cervical cancer screening  Pap smear completed today for history of L CHINYERE and positive HPV colpo completed in October 2023 plan was cotest within 1 year.    Anxiety  Concern for patient over the last several years however would like to try treatment today.  Will start patient on 5 mg of Lexapro KIEL score today was 12.  - escitalopram (LEXAPRO) 5 MG tablet; Take 1 tablet (5 mg) by mouth daily.    Screening for lung cancer  30+ year smoking history  - CT Chest Lung Cancer Screen Low Dose Without; Future    Personal history of nicotine dependence  - CT Chest Lung Cancer Screen Low Dose Without; Future    History of abnormal cervical Pap smear  History of LSIL with positive HPV    Hyperlipidemia LDL goal <100  ASCVD risk score is 7.9% starting low-dose simvastatin today will recheck cholesterol today as well as in 6 months.  - Lipid panel reflex to direct LDL Non-fasting; Future  - simvastatin (ZOCOR) 20 MG tablet; Take 1 tablet (20 mg) by mouth at bedtime.  - Lipid panel reflex to direct LDL Non-fasting    Mild dysplasia of cervix  See problem list.     LGSIL on Pap smear of cervix  - HPV and Gynecologic Cytology Panel    Vaginal high risk HPV DNA test positive  - HPV and Gynecologic Cytology Panel    Patient has been advised of split billing requirements and indicates understanding: Yes        BMI  Estimated body mass index is 27.94 kg/m  as calculated from the following:    Height as of this encounter: 1.702 m (5' 7\").    Weight as of this encounter: 80.9 kg (178 lb 6.4 oz).       Counseling  Appropriate preventive services were addressed with this patient via screening, questionnaire, or discussion as appropriate for fall " prevention, nutrition, physical activity, Tobacco-use cessation, social engagement, weight loss and cognition.  Checklist reviewing preventive services available has been given to the patient.  Reviewed patient's diet, addressing concerns and/or questions.   She is at risk for lack of exercise and has been provided with information to increase physical activity for the benefit of her well-being.   Patient reported safety concerns were addressed today.Addressed any concerns about safety while driving.  The patient was provided with written information regarding signs of hearing loss.   Information on urinary incontinence and treatment options given to patient.         Sera Cai is a 64 year old, presenting for the following:  Wellness Visit        10/15/2024     8:10 AM   Additional Questions   Roomed by Terrie LYN MA         Health Care Directive  Patient does not have a Health Care Directive or Living Will: Discussed advance care planning with patient; information given to patient to review.    HPI    History of abnormal pap with LSIL and +HPV. She is due for cotesting this year after a colpo last year.     She also mentions some symptoms of anxiety. She denies any depression but feels anxious much of the time. This has been present for a number of years. She would like to consider something to help with these symptoms. She mentions that her children bring up their concern regarding her symptoms of anxiety often.         10/15/2024   General Health   How would you rate your overall physical health? Good   Feel stress (tense, anxious, or unable to sleep) Very much      (!) STRESS CONCERN      10/15/2024   Nutrition   Diet: Regular (no restrictions)            10/15/2024   Exercise   Days per week of moderate/strenous exercise 3 days            10/15/2024   Social Factors   Frequency of gathering with friends or relatives More than three times a week   Worry food won't last until get money to buy more No    Food not last or not have enough money for food? No   Do you have housing? (Housing is defined as stable permanent housing and does not include staying ouside in a car, in a tent, in an abandoned building, in an overnight shelter, or couch-surfing.) Yes   Are you worried about losing your housing? No   Lack of transportation? No   Unable to get utilities (heat,electricity)? No            10/15/2024   Fall Risk   Fallen 2 or more times in the past year? No   Trouble with walking or balance? No             10/15/2024   Activities of Daily Living- Home Safety   Needs help with the following daily activites None of the above   Safety concerns in the home Throw rugs in the hallway    No grab bars in the bathroom    Poor lighting       Multiple values from one day are sorted in reverse-chronological order         10/15/2024   Dental   Dentist two times every year? Yes            10/15/2024   Hearing Screening   Hearing concerns? (!) I FEEL THAT PEOPLE ARE MUMBLING OR NOT SPEAKING CLEARLY.    (!) I NEED TO ASK PEOPLE TO SPEAK UP OR REPEAT THEMSELVES.    (!) IT'S HARDER TO UNDERSTAND WOMEN'S VOICES THAN MEN'S VOICES.    (!) IT'S HARD TO FOLLOW A CONVERSATION IN A NOISY RESTAURANT OR CROWDED ROOM.    (!) TROUBLE FOLLOWING DIALOGUE IN THE THEATHER.    (!) TROUBLE UNDERSTANDING SOFT OR WHISPERED SPEECH.       Multiple values from one day are sorted in reverse-chronological order         10/15/2024   Driving Risk Screening   Patient/family members have concerns about driving (!) YES she has increased anxiety when driving and worse when she is the passenger.             10/15/2024   General Alertness/Fatigue Screening   Have you been more tired than usual lately? No            10/15/2024   Urinary Incontinence Screening   Bothered by leaking urine in past 6 months Yes            10/15/2024   TB Screening   Were you born outside of the [place]? No            Today's PHQ-2 Score:       10/15/2024     8:11 AM   PHQ-2 ( 1999 [employer])    Q1: Little interest or pleasure in doing things 0   Q2: Feeling down, depressed or hopeless 0   PHQ-2 Score 0   Q1: Little interest or pleasure in doing things Not at all   Q2: Feeling down, depressed or hopeless Not at all   PHQ-2 Score 0           10/15/2024   Substance Use   Alcohol more than 3/day or more than 7/wk No   Do you have a current opioid prescription? No   How severe/bad is pain from 1 to 10? 0/10 (No Pain)   Do you use any other substances recreationally? No        Social History     Tobacco Use    Smoking status: Former     Current packs/day: 1.00     Average packs/day: 1 pack/day for 30.0 years (30.0 ttl pk-yrs)     Types: Cigarettes     Passive exposure: Never    Smokeless tobacco: Never    Tobacco comments:     pt quit smoking January 5th 2021   Vaping Use    Vaping status: Never Used   Substance Use Topics    Alcohol use: No    Drug use: No           7/19/2021   LAST FHS-7 RESULTS   1st degree relative breast or ovarian cancer No   Any relative bilateral breast cancer No   Any male have breast cancer No   Any ONE woman have BOTH breast AND ovarian cancer No   Any woman with breast cancer before 50yrs No   2 or more relatives with breast AND/OR ovarian cancer No   2 or more relatives with breast AND/OR bowel cancer No           Mammogram Screening - Patient under 40 years of age: Routine Mammogram Screening not recommended.       History of abnormal Pap smear: YES - other categories - see link Cervical Cytology Screening Guidelines        Latest Ref Rng & Units 9/26/2023    10:28 AM 9/20/2022     9:06 AM 7/13/2021     8:03 AM   PAP / HPV   PAP  Low-grade squamous intraepithelial lesion (LSIL) encompassing HPV/mild dysplasia/CIN1  Negative for Intraepithelial Lesion or Malignancy (NILM)  Low-grade squamous intraepithelial lesion (LSIL) encompassing HPV/mild dysplasia/CIN1    HPV 16 DNA Negative Negative  Negative  Negative    HPV 18 DNA Negative Negative  Negative  Negative    Other HR HPV  Negative Positive  Positive  Positive      ASCVD Risk   The 10-year ASCVD risk score (Christine CHAUDHARY, et al., 2019) is: 7.6%    Values used to calculate the score:      Age: 64 years      Sex: Female      Is Non- : No      Diabetic: No      Tobacco smoker: No      Systolic Blood Pressure: 138 mmHg      Is BP treated: No      HDL Cholesterol: 42 mg/dL      Total Cholesterol: 186 mg/dL            Reviewed and updated as needed this visit by Provider   Tobacco  Allergies  Meds  Problems  Med Hx  Surg Hx  Fam Hx            Past Medical History:   Diagnosis Date    Abnormal Pap smear of cervix 07/13/2021    Cervical high risk HPV (human papillomavirus) test positive 07/13/2021    Contact dermatitis and other eczema, due to unspecified cause     Tobacco use disorder      Past Surgical History:   Procedure Laterality Date    COLONOSCOPY N/A 08/18/2021    Procedure: COLONOSCOPY;  Surgeon: Marvin Mattson DO;  Location: [facility]    SURGICAL HISTORY OF - 1974    appendectomy     Lab work is in process  Labs reviewed in EPIC  BP Readings from Last 3 Encounters:   10/15/24 138/72   09/05/24 122/80   06/12/24 132/86    Wt Readings from Last 3 Encounters:   10/15/24 80.9 kg (178 lb 6.4 oz)   09/05/24 80.3 kg (177 lb)   04/12/24 80.3 kg (177 lb)                  Patient Active Problem List   Diagnosis    Sensorineural hearing loss    Tobacco use disorder    CARDIOVASCULAR SCREENING; LDL GOAL LESS THAN 160    Sinus disorder    Aura    Dizziness    GERD (gastroesophageal reflux disease)    Pustular psoriasis    Mild dysplasia of cervix    Eczema     Past Surgical History:   Procedure Laterality Date    COLONOSCOPY N/A 08/18/2021    Procedure: COLONOSCOPY;  Surgeon: Marvin Mattson DO;  Location: [facility]    SURGICAL HISTORY OF - 1974    appendectomy       Social History     Tobacco Use    Smoking status: Former     Current packs/day: 1.00     Average packs/day: 1 pack/day for 30.0  years (30.0 ttl pk-yrs)     Types: Cigarettes     Passive exposure: Never    Smokeless tobacco: Never    Tobacco comments:     pt quit smoking January 5th 2021   Substance Use Topics    Alcohol use: No     Family History   Problem Relation Age of Onset    Cerebrovascular Disease Mother         CVA    Cancer Father         bone    Gastrointestinal Disease Daughter         crohn's         Current Outpatient Medications   Medication Sig Dispense Refill    escitalopram (LEXAPRO) 5 MG tablet Take 1 tablet (5 mg) by mouth daily. 90 tablet 0    ibuprofen (ADVIL/MOTRIN) 200 MG tablet Take 600 mg by mouth every 4 hours as needed for pain.      magnesium hydroxide (MILK OF MAGNESIA) 400 MG/5ML suspension Take 30 mLs by mouth daily as needed for constipation or heartburn 118 mL 0    omeprazole (PRILOSEC) 20 MG DR capsule Take 1 capsule (20 mg) by mouth daily. Okay to increase to two times daily if needed for symptom management 90 capsule 0    simvastatin (ZOCOR) 20 MG tablet Take 1 tablet (20 mg) by mouth at bedtime. 90 tablet 3    diclofenac (VOLTAREN) 1 % topical gel Apply 2 g topically every 8 hours as needed for moderate pain (Patient not taking: Reported on 10/13/2023)       Allergies   Allergen Reactions    Adalimumab Hives, Itching and Swelling    Dupixent [Dupilumab] Hives, Itching and Swelling    Neosporin [Neomycin-Polymyxin-Gramicidin]     Shellfish-Derived Products     Zyban [Bupropion Hcl] Rash     Recent Labs   Lab Test 04/12/24  0918 09/26/23  1047 09/20/22  0948 09/20/22  0948 07/13/21  0828 02/05/20  1032 12/09/19  0949   LDL  --  92  --  111* 100  --   --    HDL  --  42*  --  46* 62  --   --    TRIG  --  261*  --  212* 164*  --   --    ALT  --  21  --   --   --  26 24   CR 0.74 0.82   < > 0.86  --  0.69 0.76   GFRESTIMATED 89 79   < > 75  --  >90 83   GFRESTBLACK  --   --   --   --   --  >90 >90   POTASSIUM 4.3 4.8   < > 4.9  --  3.8 4.0   TSH 1.59  --   --   --  1.22  --   --     < > = values in this  "interval not displayed.      Current providers sharing in care for this patient include:  Patient Care Team:  Kaylene Drew APRN CNP as PCP - General (Nurse Practitioner - Family)  Kaylene Drew APRN CNP as Assigned PCP  Caitlyn Wu MD as Assigned OBGYN Provider    The following health maintenance items are reviewed in Epic and correct as of today:  Health Maintenance   Topic Date Due    Pneumococcal Vaccine: 65+ Years (1 of 1 - PCV) Never done    COVID-19 Vaccine (2 - 2024-25 season) 09/01/2024    LUNG CANCER SCREENING  10/05/2024    MAMMO SCREENING  09/27/2024    PAP FOLLOW-UP  10/13/2024    HPV FOLLOW-UP  10/13/2024    BMP  04/12/2025    MEDICARE ANNUAL WELLNESS VISIT  10/15/2025    ANNUAL REVIEW OF HM ORDERS  10/15/2025    FALL RISK ASSESSMENT  10/15/2025    DTAP/TDAP/TD IMMUNIZATION (2 - Td or Tdap) 01/10/2027    GLUCOSE  04/12/2027    LIPID  09/26/2028    ADVANCE CARE PLANNING  10/15/2029    COLORECTAL CANCER SCREENING  08/18/2031    RSV VACCINE (1 - 1-dose 75+ series) 01/29/2033    DEXA  10/05/2038    HEPATITIS C SCREENING  Completed    PHQ-2 (once per calendar year)  Completed    INFLUENZA VACCINE  Completed    ZOSTER IMMUNIZATION  Completed    HPV IMMUNIZATION  Aged Out    MENINGITIS IMMUNIZATION  Aged Out    RSV MONOCLONAL ANTIBODY  Aged Out    PAP  Discontinued         Review of Systems  Constitutional, neuro, ENT, endocrine, pulmonary, cardiac, gastrointestinal, genitourinary, musculoskeletal, integument and psychiatric systems are negative, except as otherwise noted.     Objective    Exam  /72   Pulse 74   Temp 97.1  F (36.2  C) (Tympanic)   Resp 16   Ht 1.702 m (5' 7\")   Wt 80.9 kg (178 lb 6.4 oz)   LMP 04/03/2007   SpO2 97%   BMI 27.94 kg/m     Estimated body mass index is 27.94 kg/m  as calculated from the following:    Height as of this encounter: 1.702 m (5' 7\").    Weight as of this encounter: 80.9 kg (178 lb 6.4 oz).    Physical Exam  GENERAL: alert and no " distress  EYES: Eyes grossly normal to inspection, PERRL and conjunctivae and sclerae normal  HENT: ear canals and TM's normal, nose and mouth without ulcers or lesions  NECK: no adenopathy, no asymmetry, masses, or scars  RESP: lungs clear to auscultation - no rales, rhonchi or wheezes  CV: regular rate and rhythm, normal S1 S2, no S3 or S4, no murmur, click or rub, no peripheral edema  ABDOMEN: soft, nontender, no hepatosplenomegaly, no masses and bowel sounds normal   (female) w/bimanual: normal female external genitalia, normal urethral meatus, normal vaginal mucosa, and normal cervix/adnexa/uterus without masses or discharge. PAP completed today  MS: no gross musculoskeletal defects noted, no edema  SKIN: no suspicious lesions or rashes  NEURO: Normal strength and tone, mentation intact and speech normal  PSYCH: mentation appears normal, affect normal/bright        10/15/2024   Mini Cog   Clock Draw Score 2 Normal   3 Item Recall 3 objects recalled   Mini Cog Total Score 5                 Signed Electronically by: ARCHIE Navarrete CNP

## 2024-10-15 NOTE — PATIENT INSTRUCTIONS
Patient Education   Preventive Care Advice   This is general advice given by our system to help you stay healthy. However, your care team may have specific advice just for you. Please talk to your care team about your preventive care needs.  Nutrition  Eat 5 or more servings of fruits and vegetables each day.  Try wheat bread, brown rice and whole grain pasta (instead of white bread, rice, and pasta).  Get enough calcium and vitamin D. Check the label on foods and aim for 100% of the RDA (recommended daily allowance).  Lifestyle  Exercise at least 150 minutes each week  (30 minutes a day, 5 days a week).  Do muscle strengthening activities 2 days a week. These help control your weight and prevent disease.  No smoking.  Wear sunscreen to prevent skin cancer.  Have a dental exam and cleaning every 6 months.  Yearly exams  See your health care team every year to talk about:  Any changes in your health.  Any medicines your care team has prescribed.  Preventive care, family planning, and ways to prevent chronic diseases.  Shots (vaccines)   HPV shots (up to age 26), if you've never had them before.  Hepatitis B shots (up to age 59), if you've never had them before.  COVID-19 shot: Get this shot when it's due.  Flu shot: Get a flu shot every year.  Tetanus shot: Get a tetanus shot every 10 years.  Pneumococcal, hepatitis A, and RSV shots: Ask your care team if you need these based on your risk.  Shingles shot (for age 50 and up)  General health tests  Diabetes screening:  Starting at age 35, Get screened for diabetes at least every 3 years.  If you are younger than age 35, ask your care team if you should be screened for diabetes.  Cholesterol test: At age 39, start having a cholesterol test every 5 years, or more often if advised.  Bone density scan (DEXA): At age 50, ask your care team if you should have this scan for osteoporosis (brittle bones).  Hepatitis C: Get tested at least once in your life.  STIs (sexually  transmitted infections)  Before age 24: Ask your care team if you should be screened for STIs.  After age 24: Get screened for STIs if you're at risk. You are at risk for STIs (including HIV) if:  You are sexually active with more than one person.  You don't use condoms every time.  You or a partner was diagnosed with a sexually transmitted infection.  If you are at risk for HIV, ask about PrEP medicine to prevent HIV.  Get tested for HIV at least once in your life, whether you are at risk for HIV or not.  Cancer screening tests  Cervical cancer screening: If you have a cervix, begin getting regular cervical cancer screening tests starting at age 21.  Breast cancer scan (mammogram): If you've ever had breasts, begin having regular mammograms starting at age 40. This is a scan to check for breast cancer.  Colon cancer screening: It is important to start screening for colon cancer at age 45.  Have a colonoscopy test every 10 years (or more often if you're at risk) Or, ask your provider about stool tests like a FIT test every year or Cologuard test every 3 years.  To learn more about your testing options, visit:   .  For help making a decision, visit:   https://bit.ly/uh53925.  Prostate cancer screening test: If you have a prostate, ask your care team if a prostate cancer screening test (PSA) at age 55 is right for you.  Lung cancer screening: If you are a current or former smoker ages 50 to 80, ask your care team if ongoing lung cancer screenings are right for you.  For informational purposes only. Not to replace the advice of your health care provider. Copyright   2023 [facility]. All rights reserved. Clinically reviewed by the [facility]itions [facility]. Victiv 902103 - REV 01/24.  Learning About Activities of Daily Living  What are activities of daily living?     Activities of daily living (ADLs) are the basic self-care tasks you do every day. These include eating, bathing, dressing,  and moving around.  As you age, and if you have health problems, you may find that it's harder to do some of these tasks. If so, your doctor can suggest ideas that may help.  To measure what kind of help you may need, your doctor will ask how well you are able to do ADLs. Let your doctor know if there are any tasks that you are having trouble doing. This is an important first step to getting help. And when you have the help you need, you can stay as independent as possible.  How will a doctor assess your ADLs?  Asking about ADLs is part of a routine health checkup your doctor will likely do as you age. Your health check might be done in a doctor's office, in your home, or at a hospital. The goal is to find out if you are having any problems that could make it hard to care for yourself or that make it unsafe for you to be on your own.  To measure your ADLs, your doctor will ask how hard it is for you to do routine tasks. Your doctor may also want to know if you have changed the way you do a task because of a health problem. Your doctor may watch how you:  Walk back and forth.  Keep your balance while you stand or walk.  Move from sitting to standing or from a bed to a chair.  Button or unbutton a shirt or sweater.  Remove and put on your shoes.  It's common to feel a little worried or anxious if you find you can't do all the things you used to be able to do. Talking with your doctor about ADLs is a way to make sure you're as safe as possible and able to care for yourself as well as you can. You may want to bring a caregiver, friend, or family member to your checkup. They can help you talk to your doctor.  Follow-up care is a key part of your treatment and safety. Be sure to make and go to all appointments, and call your doctor if you are having problems. It's also a good idea to know your test results and keep a list of the medicines you take.  Current as of: October 24, 2023  Content Version: 14.2 2024 [facility]  [employer].   Care instructions adapted under license by your healthcare professional. If you have questions about a medical condition or this instruction, always ask your healthcare professional. [employer] disclaims any warranty or liability for your use of this information.    Hearing Loss: Care Instructions  Overview     Hearing loss is a sudden or slow decrease in how well you hear. It can range from slight to profound. Permanent hearing loss can occur with aging. It also can happen when you are exposed long-term to loud noise. Examples include listening to loud music, riding motorcycles, or being around other loud machines.  Hearing loss can affect your work and home life. It can make you feel lonely or depressed. You may feel that you have lost your independence. But hearing aids and other devices can help you hear better and feel connected to others.  Follow-up care is a key part of your treatment and safety. Be sure to make and go to all appointments, and call your doctor if you are having problems. It's also a good idea to know your test results and keep a list of the medicines you take.  How can you care for yourself at home?  Avoid loud noises whenever possible. This helps keep your hearing from getting worse.  Always wear hearing protection around loud noises.  Wear a hearing aid as directed.  A professional can help you pick a hearing aid that will work best for you.  You can also get hearing aids over the counter for mild to moderate hearing loss.  Have hearing tests as your doctor suggests. They can show whether your hearing has changed. Your hearing aid may need to be adjusted.  Use other devices as needed. These may include:  Telephone amplifiers and hearing aids that can connect to a television, stereo, radio, or microphone.  Devices that use lights or vibrations. These alert you to the doorbell, a ringing telephone, or a baby monitor.  Television closed-captioning. This shows  "the words at the bottom of the screen. Most new TVs can do this.  TTY (text telephone). This lets you type messages back and forth on the telephone instead of talking or listening. These devices are also called TDD. When messages are typed on the keyboard, they are sent over the phone line to a receiving TTY. The message is shown on a monitor.  Use text messaging, social media, and email if it is hard for you to communicate by telephone.  Try to learn a listening technique called speechreading. It is not lipreading. You pay attention to people's gestures, expressions, posture, and tone of voice. These clues can help you understand what a person is saying. Face the person you are talking to, and have them face you. Make sure the lighting is good. You need to see the other person's face clearly.  Think about counseling if you need help to adjust to your hearing loss.  When should you call for help?  Watch closely for changes in your health, and be sure to contact your doctor if:    You think your hearing is getting worse.     You have new symptoms, such as dizziness or nausea.   Where can you learn more?  Go to https://www.FreePriceAlerts.net/patiented  Enter R798 in the search box to learn more about \"Hearing Loss: Care Instructions.\"  Current as of: September 27, 2023  Content Version: 14.2 2024 [employer].   Care instructions adapted under license by your healthcare professional. If you have questions about a medical condition or this instruction, always ask your healthcare professional. [employer] disclaims any warranty or liability for your use of this information.    Learning About Stress  What is stress?     Stress is your body's response to a hard situation. Your body can have a physical, emotional, or mental response. Stress is a fact of life for most people, and it affects everyone differently. What causes stress for you may not be stressful for someone else.  A lot of things can " cause stress. You may feel stress when you go on a job interview, take a test, or run a race. This kind of short-term stress is normal and even useful. It can help you if you need to work hard or react quickly. For example, stress can help you finish an important job on time.  Long-term stress is caused by ongoing stressful situations or events. Examples of long-term stress include long-term health problems, ongoing problems at work, or conflicts in your family. Long-term stress can harm your health.  How does stress affect your health?  When you are stressed, your body responds as though you are in danger. It makes hormones that speed up your heart, make you breathe faster, and give you a burst of energy. This is called the fight-or-flight stress response. If the stress is over quickly, your body goes back to normal and no harm is done.  But if stress happens too often or lasts too long, it can have bad effects. Long-term stress can make you more likely to get sick, and it can make symptoms of some diseases worse. If you tense up when you are stressed, you may develop neck, shoulder, or low back pain. Stress is linked to high blood pressure and heart disease.  Stress also harms your emotional health. It can make you franco, tense, or depressed. Your relationships may suffer, and you may not do well at work or school.  What can you do to manage stress?  You can try these things to help manage stress:   Do something active. Exercise or activity can help reduce stress. Walking is a great way to get started. Even everyday activities such as housecleaning or yard work can help.  Try yoga or garrison chi. These techniques combine exercise and meditation. You may need some training at first to learn them.  Do something you enjoy. For example, listen to music or go to a movie. Practice your hobby or do volunteer work.  Meditate. This can help you relax, because you are not worrying about what happened before or what may happen  "in the future.  Do guided imagery. Imagine yourself in any setting that helps you feel calm. You can use online videos, books, or a teacher to guide you.  Do breathing exercises. For example:  From a standing position, bend forward from the waist with your knees slightly bent. Let your arms dangle close to the floor.  Breathe in slowly and deeply as you return to a standing position. Roll up slowly and lift your head last.  Hold your breath for just a few seconds in the standing position.  Breathe out slowly and bend forward from the waist.  Let your feelings out. Talk, laugh, cry, and express anger when you need to. Talking with supportive friends or family, a counselor, or a nadir leader about your feelings is a healthy way to relieve stress. Avoid discussing your feelings with people who make you feel worse.  Write. It may help to write about things that are bothering you. This helps you find out how much stress you feel and what is causing it. When you know this, you can find better ways to cope.  What can you do to prevent stress?  You might try some of these things to help prevent stress:  Manage your time. This helps you find time to do the things you want and need to do.  Get enough sleep. Your body recovers from the stresses of the day while you are sleeping.  Get support. Your family, friends, and community can make a difference in how you experience stress.  Limit your news feed. Avoid or limit time on social media or news that may make you feel stressed.  Do something active. Exercise or activity can help reduce stress. Walking is a great way to get started.  Where can you learn more?  Go to https://www.MobiTV.net/patiented  Enter N032 in the search box to learn more about \"Learning About Stress.\"  Current as of: October 24, 2023  Content Version: 14.2 2024 [facility] [employer].   Care instructions adapted under license by your healthcare professional. If you have questions about a medical " condition or this instruction, always ask your healthcare professional. Healthwise, [facility] disclaims any warranty or liability for your use of this information.    Bladder Training: Care Instructions  Your Care Instructions     Bladder training is used to treat urge incontinence and stress incontinence. Urge incontinence means that the need to urinate comes on so fast that you can't get to a toilet in time. Stress incontinence means that you leak urine because of pressure on your bladder. For example, it may happen when you laugh, cough, or lift something heavy.  Bladder training can increase how long you can wait before you have to urinate. It can also help your bladder hold more urine. And it can give you better control over the urge to urinate.  It is important to remember that bladder training takes a few weeks to a few months to make a difference. You may not see results right away, but don't give up.  Follow-up care is a key part of your treatment and safety. Be sure to make and go to all appointments, and call your doctor if you are having problems. It's also a good idea to know your test results and keep a list of the medicines you take.  How can you care for yourself at home?  Work with your doctor to come up with a bladder training program that is right for you. You may use one or more of the following methods.  Delayed urination  In the beginning, try to keep from urinating for 5 minutes after you first feel the need to go.  While you wait, take deep, slow breaths to relax. Kegel exercises can also help you delay the need to go to the bathroom.  After some practice, when you can easily wait 5 minutes to urinate, try to wait 10 minutes before you urinate.  Slowly increase the waiting period until you are able to control when you have to urinate.  Scheduled urination  Empty your bladder when you first wake up in the morning.  Schedule times throughout the day when you will urinate.  Start by going to  "the bathroom every hour, even if you don't need to go.  Slowly increase the time between trips to the bathroom.  When you have found a schedule that works well for you, keep doing it.  If you wake up during the night and have to urinate, do it. Apply your schedule to waking hours only.  Kegel exercises  These tighten and strengthen pelvic muscles, which can help you control the flow of urine. (If doing these exercises causes pain, stop doing them and talk with your doctor.) To do Kegel exercises:  Squeeze your muscles as if you were trying not to pass gas. Or squeeze your muscles as if you were stopping the flow of urine. Your belly, legs, and buttocks shouldn't move.  Hold the squeeze for 3 seconds, then relax for 5 to 10 seconds.  Start with 3 seconds, then add 1 second each week until you are able to squeeze for 10 seconds.  Repeat the exercise 10 times a session. Do 3 to 8 sessions a day.  When should you call for help?  Watch closely for changes in your health, and be sure to contact your doctor if:    Your incontinence is getting worse.     You do not get better as expected.   Where can you learn more?  Go to https://www.TuneGO.net/patiented  Enter V684 in the search box to learn more about \"Bladder Training: Care Instructions.\"  Current as of: November 15, 2023  Content Version: 14.2 2024 [facility] [employer].   Care instructions adapted under license by your healthcare professional. If you have questions about a medical condition or this instruction, always ask your healthcare professional. Healthwise, Incorporated disclaims any warranty or liability for your use of this information.       "

## 2024-10-16 LAB
HPV HR 12 DNA CVX QL NAA+PROBE: POSITIVE
HPV16 DNA CVX QL NAA+PROBE: NEGATIVE
HPV18 DNA CVX QL NAA+PROBE: NEGATIVE
HUMAN PAPILLOMA VIRUS FINAL DIAGNOSIS: ABNORMAL

## 2024-10-21 ENCOUNTER — PATIENT OUTREACH (OUTPATIENT)
Dept: FAMILY MEDICINE | Facility: CLINIC | Age: 64
End: 2024-10-21
Payer: MEDICARE

## 2024-10-21 LAB
BKR AP ASSOCIATED HPV REPORT: NORMAL
BKR LAB AP GYN ADEQUACY: NORMAL
BKR LAB AP GYN INTERPRETATION: NORMAL
BKR LAB AP PREVIOUS ABNL DX: NORMAL
BKR LAB AP PREVIOUS ABNORMAL: NORMAL
PATH REPORT.COMMENTS IMP SPEC: NORMAL
PATH REPORT.COMMENTS IMP SPEC: NORMAL
PATH REPORT.RELEVANT HX SPEC: NORMAL

## 2024-12-29 ENCOUNTER — HEALTH MAINTENANCE LETTER (OUTPATIENT)
Age: 64
End: 2024-12-29

## 2025-02-26 DIAGNOSIS — F41.9 ANXIETY: ICD-10-CM

## 2025-02-26 NOTE — TELEPHONE ENCOUNTER
Pended refill of escitalopram 5mg for provider review. Appt needed? PHQ/KIEL needed?    Lizbeth SERVIN LPN

## 2025-02-26 NOTE — TELEPHONE ENCOUNTER
.  General Call    Contacts       Contact Date/Time Type Contact Phone/Fax    02/26/2025 03:22 PM CST Phone (Incoming) Ayala Wilson (Self) [phone] (H)          Reason for Call:  started new meds, wants to know if she can stay on it, escitalopram 5 mg    What are your questions or concerns:  can she stay on meds    Date of last appointment with provider: 1-/15/2024    Could we send this information to you in [facility] or would you prefer to receive a phone call?:   Patient would prefer a phone call   Okay to leave a detailed message?: Yes at Home number on file [phone] (home)

## 2025-02-27 RX ORDER — ESCITALOPRAM OXALATE 5 MG/1
5 TABLET ORAL DAILY
Qty: 90 TABLET | Refills: 0 | Status: SHIPPED | OUTPATIENT
Start: 2025-02-27

## 2025-03-19 NOTE — TELEPHONE ENCOUNTER
Kay Aguila MD  P Fl Melanie/Lpn Baker    Caller: Unspecified (Yesterday,  1:54 PM)               If Ayala is interested in weaning off of the Keppra at this point, I think that would be okay (with the understanding that the dizzy spells could return and we might have to resume the medication).     Decrease by 500mg/d weekly: so 500mg/1000mg for one week, then 500mg BID for one week, then 500mg daily for one week and then stop.     J         BP s/p 500 LR bolus=78/46 at 0539

## 2025-03-26 ENCOUNTER — PATIENT OUTREACH (OUTPATIENT)
Dept: CARE COORDINATION | Facility: CLINIC | Age: 65
End: 2025-03-26
Payer: MEDICARE

## 2025-04-02 ENCOUNTER — PATIENT OUTREACH (OUTPATIENT)
Dept: OBGYN | Facility: CLINIC | Age: 65
End: 2025-04-02
Payer: MEDICARE

## 2025-04-02 NOTE — LETTER
April 2, 2025      Ayala Wilson  [address] [place] [address]              Dear Ayala,      To ensure we are providing the best quality care, we have reviewed your chart and see that you are due for:    Breast Cancer Screening:    Please call [facility]  at [phone] to schedule a Mammogram.    You may call Dept: [phone] if you have any questions. If you have completed the mammogram outside of [facility], please have the results forwarded to our office Fax # [phone]. We will update the chart for your primary Physician to review before your next annual physical.        Sincerely,      Caitlyn Wu MD

## 2025-04-02 NOTE — TELEPHONE ENCOUNTER
Panel Management Review        Health Maintenance List    Health Maintenance   Topic Date Due    Pneumococcal Vaccine: 50+ Years (1 of 1 - PCV) Never done    COVID-19 Vaccine (2 - 2024-25 season) 09/01/2024    MAMMO SCREENING  09/27/2024    LUNG CANCER SCREENING  10/05/2024    BMP  04/12/2025    MEDICARE ANNUAL WELLNESS VISIT  10/15/2025    LIPID  10/15/2025    FALL RISK ASSESSMENT  10/15/2025    PAP FOLLOW-UP  10/15/2025    HPV FOLLOW-UP  10/15/2025    DTAP/TDAP/TD IMMUNIZATION (2 - Td or Tdap) 01/10/2027    DIABETES SCREENING  04/12/2027    ADVANCE CARE PLANNING  10/15/2029    COLORECTAL CANCER SCREENING  08/18/2031    RSV VACCINE (1 - 1-dose 75+ series) 01/29/2033    DEXA  10/05/2038    HEPATITIS C SCREENING  Completed    PHQ-2 (once per calendar year)  Completed    INFLUENZA VACCINE  Completed    ZOSTER IMMUNIZATION  Completed    HPV IMMUNIZATION  Aged Out    MENINGITIS IMMUNIZATION  Aged Out    PAP  Discontinued       Composite cancer screening  Chart review shows that this patient is due/due soon for the following Mammogram  Lab Results   Component Value Date    PAP NIL 02/08/2012     Past Surgical History:   Procedure Laterality Date    COLONOSCOPY N/A 08/18/2021    Procedure: COLONOSCOPY;  Surgeon: Marvin Mattson DO;  Location: [facility]    IR LUMBAR PUNCTURE  5/1/2013    SURGICAL HISTORY OF -   1974    appendectomy       Is hysterectomy listed in surgical history? No   Is mastectomy listed in surgical history? No     Summary:    Patient is due/failing the following:   Mammogram    Action needed: Patient needs office visit for mammogram.    Type of outreach:  Sent [employer] message.      Staff Signature:  Sahra Flores CMA

## 2025-05-21 ENCOUNTER — HOSPITAL ENCOUNTER (OUTPATIENT)
Dept: MAMMOGRAPHY | Facility: CLINIC | Age: 65
Discharge: HOME | End: 2025-05-21
Attending: NURSE PRACTITIONER
Payer: MEDICARE

## 2025-05-21 ENCOUNTER — RESULTS FOLLOW-UP (OUTPATIENT)
Dept: PULMONOLOGY | Facility: CLINIC | Age: 65
End: 2025-05-21

## 2025-05-21 ENCOUNTER — HOSPITAL ENCOUNTER (OUTPATIENT)
Dept: CT IMAGING | Facility: CLINIC | Age: 65
Discharge: HOME | End: 2025-05-21
Attending: NURSE PRACTITIONER
Payer: MEDICARE

## 2025-05-21 ENCOUNTER — TELEPHONE (OUTPATIENT)
Dept: PULMONOLOGY | Facility: CLINIC | Age: 65
End: 2025-05-21
Payer: MEDICARE

## 2025-05-21 DIAGNOSIS — Z12.2 SCREENING FOR LUNG CANCER: ICD-10-CM

## 2025-05-21 DIAGNOSIS — R91.1 LUNG NODULE: Primary | ICD-10-CM

## 2025-05-21 DIAGNOSIS — Z87.891 PERSONAL HISTORY OF NICOTINE DEPENDENCE: ICD-10-CM

## 2025-05-21 DIAGNOSIS — Z12.31 VISIT FOR SCREENING MAMMOGRAM: ICD-10-CM

## 2025-05-21 DIAGNOSIS — R91.8 PULMONARY NODULES: Primary | ICD-10-CM

## 2025-05-21 PROCEDURE — 71271 CT THORAX LUNG CANCER SCR C-: CPT

## 2025-05-21 PROCEDURE — 77063 BREAST TOMOSYNTHESIS BI: CPT

## 2025-05-21 NOTE — TELEPHONE ENCOUNTER
[facility] Radiology Lung Cancer Screening CT result notification:     LDCT/Lung Cancer Screening CT Exam date: 5/21/2025  Radiologist Impression AND Recommendations:   Lung-RADS Category 3. Probably benign finding(s) - Short term follow up suggested with a 6 month low dose CT.    Pertinent Findings:  Posterior solid right lower lobe nodule is 4 mm, stable in size series 3 image 148. New groundglass nodule posterior right lower lobe is 6 x 4 mm, image 134. There are other stable scattered calcified and   noncalcified nodules.     Ordering Provider: KARISHMA Cortes did not receive the remaining radiology results from their PCP.        Lung Nodule Program Protocol recommendation [Pertaining to lung nodules]:    Lung RADS 3 Protocol: Results RN to notify Patient of results/recommendations and place order for 6 month CT ([id number]) - MD gutierrez  CT Chest order ([id number]) placed to be completed within 6 months (Yes/No):  LDCT due on or after 11/21/2025.      RN communicated the lung nodule finding to the patient (Yes/No):  Yes.  The patient had the following questions: N/a  Correct letter sent as per [facility] Lung nodule protocol (Yes/No):  Yes.  Did Patient have any CT's of chest previous? (Yes/No/NA) N/a  If no comparisons used, inquire if patient has had any chest CT's in the past and if so, request priors.    Unique Westbrook, RN  RN Care Coordinator  Lung Nodule/Interventional Pulmonology Clinic  [facility]  Phone: [phone]

## 2025-05-28 DIAGNOSIS — R09.A2 GLOBUS SENSATION: ICD-10-CM

## 2025-05-28 RX ORDER — OMEPRAZOLE 20 MG/1
20 CAPSULE, DELAYED RELEASE ORAL DAILY
Qty: 90 CAPSULE | Refills: 0 | Status: SHIPPED | OUTPATIENT
Start: 2025-05-28

## 2025-05-28 NOTE — TELEPHONE ENCOUNTER
Requested Prescriptions   Pending Prescriptions Disp Refills    omeprazole (PRILOSEC) 20 MG DR capsule 90 capsule 0     Sig: Take 1 capsule (20 mg) by mouth daily. Okay to increase to two times daily if needed for symptom management       PPI Protocol Failed - 5/28/2025  9:13 AM        Failed - Medication indicated for associated diagnosis     The medication is prescribed for one or more of the following conditions:     Erosive esophagitis    Gastritis   Gastric hypersecretion   Gastric ulcer   Gastroesophageal reflux disease   Helicobacter pylori gastrointestinal tract infection   Ulcer of duodenum   Drug-induced peptic ulcer   Esophageal stricture   Gastrointestinal hemorrhage   Indigestion   Stress ulcer   Zollinger-Rodriguez syndrome   Richards s esophagus   Laryngopharyngeal reflux   Epigastric Pain   Morbid Obesity   Cough   History of Peptic Ulcer   Esophageal Atresia, Stenosis and Fistula   Cystic Fibrosis  Bronchiectasis          Passed - Medication is active on med list and the sig matches. RN to manually verify dose and sig if red X/fail.     If the protocol passes (green check), you do not need to verify med dose and sig.    A prescription matches if they are the same clinical intention.    For Example: once daily and every morning are the same.    The protocol can not identify upper and lower case letters as matching and will fail.     For Example: Take 1 tablet (50 mg) by mouth daily     TAKE 1 TABLET (50 MG) BY MOUTH DAILY    For all fails (red x), verify dose and sig.    If the refill does match what is on file, the RN can still proceed to approve the refill request.       If they do not match, route to the appropriate provider.             Passed - Recent (12 month) or future (90 days) visit with authorizing provider's specialty (provided they have been seen in the past 15 months)     The patient must have completed an in-person or virtual visit within the past 12 months or has a future visit scheduled  within the next 90 days with the authorizing provider s specialty.  Urgent care and e-visits do not qualify as an office visit for this protocol.          Passed - Patient is age 18 or older        Passed - No active pregnacy on record        Passed - No positive pregnancy test in past 12 months

## 2025-05-28 NOTE — TELEPHONE ENCOUNTER
Medication Question or Refill    Contacts       Contact Date/Time Type Contact Phone/Fax    05/28/2025 08:38 AM CDT Phone (Incoming) Ayala Wilson (Self) [phone] (H)          What medication are you calling about (include dose and sig)?: Omeprazole - No need to call patient back, unless there are questions or problems.      Preferred Pharmacy:   [facility] [address] [address] [address]  Phone: [phone] Fax: [phone]    Controlled Substance Agreement on file:   CSA -- Patient Level:    CSA: None found at the patient level.       Who prescribed the medication?: Forslund    Do you need a refill? Yes    When did you use the medication last? Today    Patient offered an appointment? No    Do you have any questions or concerns?  No    Could we send this information to you in [facility] or would you prefer to receive a phone call?:   Patient would prefer a phone call   Okay to leave a detailed message?: Yes at Cell number on file:    Telephone Information:   Mobile [phone]

## 2025-07-05 ENCOUNTER — HOSPITAL ENCOUNTER (EMERGENCY)
Facility: CLINIC | Age: 65
Discharge: HOME | End: 2025-07-05
Payer: MEDICARE

## 2025-07-05 VITALS
DIASTOLIC BLOOD PRESSURE: 83 MMHG | TEMPERATURE: 98.7 F | SYSTOLIC BLOOD PRESSURE: 128 MMHG | OXYGEN SATURATION: 95 % | RESPIRATION RATE: 18 BRPM | HEART RATE: 81 BPM

## 2025-07-05 DIAGNOSIS — T63.441A BEE STING REACTION: ICD-10-CM

## 2025-07-05 PROCEDURE — 99213 OFFICE O/P EST LOW 20 MIN: CPT

## 2025-07-05 PROCEDURE — G0463 HOSPITAL OUTPT CLINIC VISIT: HCPCS

## 2025-07-05 RX ORDER — PREDNISONE 20 MG/1
TABLET ORAL
Qty: 10 TABLET | Refills: 0 | Status: SHIPPED | OUTPATIENT
Start: 2025-07-05

## 2025-07-05 RX ORDER — CETIRIZINE HYDROCHLORIDE 10 MG/1
10 TABLET ORAL 2 TIMES DAILY
Qty: 10 TABLET | Refills: 0 | Status: SHIPPED | OUTPATIENT
Start: 2025-07-05 | End: 2025-07-10

## 2025-07-05 ASSESSMENT — ACTIVITIES OF DAILY LIVING (ADL): ADLS_ACUITY_SCORE: 41

## 2025-07-05 ASSESSMENT — COLUMBIA-SUICIDE SEVERITY RATING SCALE - C-SSRS
2. HAVE YOU ACTUALLY HAD ANY THOUGHTS OF KILLING YOURSELF IN THE PAST MONTH?: NO
6. HAVE YOU EVER DONE ANYTHING, STARTED TO DO ANYTHING, OR PREPARED TO DO ANYTHING TO END YOUR LIFE?: NO
1. IN THE PAST MONTH, HAVE YOU WISHED YOU WERE DEAD OR WISHED YOU COULD GO TO SLEEP AND NOT WAKE UP?: NO

## 2025-07-05 ASSESSMENT — VISUAL ACUITY
OU: 20/30;WITH CORRECTIVE LENSES
OS: 20/50;WITH CORRECTIVE LENSES
OD: 20/40;WITH CORRECTIVE LENSES

## 2025-07-05 NOTE — ED PROVIDER NOTES
History     Chief Complaint   Patient presents with    Eye Problem       Ayala Wilson is a 65 year old female with significant pmhx of SNHL, GERD, pustular psoriasis, eczema who presents for evaluation of bee sting.  Patient states she was mowing the lawn on Thursday (2 days ago) when she unknowingly came up on a beehive that was under her porch.  The stung her near her right eyebrow.  She immediately developed swelling and redness around the right eye.  Despite the sting occurring 2 days ago, she continues to have significant swelling and redness around the eye.  She also complains of itchiness.  She denies vision changes, fever, severe pain, history of anaphylactic reaction to bee sting.  She denies associated throat swelling, difficulty breathing, nausea/vomiting, rash.  She has been doing cool compresses with provide temporary relief.  She is only taken 1 dose of Benadryl.    Allergies:  Allergies   Allergen Reactions    Adalimumab Hives, Itching and Swelling    Dupixent [Dupilumab] Hives, Itching and Swelling    Neosporin [Neomycin-Polymyxin-Gramicidin]     Shellfish-Derived Products     Zyban [Bupropion Hcl] Rash       Problem List:    Patient Active Problem List    Diagnosis Date Noted    Mild dysplasia of cervix 07/21/2021     Priority: Medium     2005, 2006, 2007, 2008, 2011, 2012 NIL Paps  7/13/21 LSIL pap, + HR HPV (neg 16/18). [place] due by 10/13/21  9/2/21 [place] Bx Neg, ECC KEVIN 1. Plan cotest in 1 year  9/20/22 NIL pap, +HR HPV (not 16/18). Plan: cotest in 1 year  9/26/23 LSIL pap, + HR HPV (not 16 or 18). Plan: colp by 12/26/23  10/13/23 [facility] ECC scant but negative. Plan: cotest in 1 year  10/15/24 NIL pap, +HR HPV (not 16/18). 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 Provider to review          Past Medical History:    Past Medical History:   Diagnosis Date    Abnormal Pap smear of cervix 07/13/2021    Cervical high risk HPV (human papillomavirus) test positive 07/13/2021    Contact dermatitis and other eczema, due to unspecified cause     Tobacco use disorder        Past Surgical History:    Past Surgical History:   Procedure Laterality Date    COLONOSCOPY N/A 08/18/2021    Procedure: COLONOSCOPY;  Surgeon: Marvin Mattson DO;  Location: [facility]    IR LUMBAR PUNCTURE  5/1/2013    SURGICAL HISTORY OF -   1974    appendectomy       Family History:    Family History   Problem Relation Age of Onset    Cerebrovascular Disease Mother         CVA    Cancer Father         bone    Gastrointestinal Disease Daughter         crohn's       Social History:  Marital Status:  Single [1]  Social History     Tobacco Use    Smoking status: Former     Current packs/day: 1.00     Average packs/day: 1 pack/day for 30.0 years (30.0 ttl pk-yrs)     Types: Cigarettes     Passive exposure: Never    Smokeless tobacco: Never    Tobacco comments:     pt quit smoking January 5th 2021   Vaping Use    Vaping status: Never Used   Substance Use Topics    Alcohol use: No    Drug use: No        Medications:    cetirizine (ZYRTEC) 10 MG tablet  predniSONE (DELTASONE) 20 MG tablet  diclofenac (VOLTAREN) 1 % topical gel  escitalopram (LEXAPRO) 5 MG tablet  ibuprofen (ADVIL/MOTRIN) 200 MG tablet  magnesium hydroxide (MILK OF MAGNESIA) 400 MG/5ML suspension  omeprazole (PRILOSEC) 20 MG DR capsule  simvastatin (ZOCOR) 20 MG tablet          Physical Exam   BP:  128/83  Pulse: 81  Temp: 98.7  F (37.1  C)  Resp: 18  SpO2: 95 %      Physical Exam  Vitals and nursing note reviewed.   Constitutional:       General: She is not in acute distress.     Appearance: She is not ill-appearing, toxic-appearing or diaphoretic.   HENT:      Head: Normocephalic and atraumatic.      Jaw: There is normal jaw occlusion.      Comments: Right periorbital swelling and erythema, with small pinpoint area where the initial sting occurred by the right eyebrow.  No signs of retained stinger.     Nose: Nose normal.      Mouth/Throat:      Mouth: Mucous membranes are moist.      Pharynx: Oropharynx is clear. Uvula midline. No pharyngeal swelling or uvula swelling.   Eyes:      Extraocular Movements: Extraocular movements intact.      Pupils: Pupils are equal, round, and reactive to light.   Cardiovascular:      Rate and Rhythm: Normal rate and regular rhythm.      Pulses: Normal pulses.   Pulmonary:      Effort: Pulmonary effort is normal.   Musculoskeletal:         General: Normal range of motion.      Cervical back: Normal range of motion.   Skin:     General: Skin is warm.   Neurological:      General: No focal deficit present.      Mental Status: She is alert and oriented to person, place, and time.   Psychiatric:         Mood and Affect: Mood normal.         Behavior: Behavior normal.         ED Course        Procedures                    No results found for this or any previous visit (from the past 24 hours).    Medications - No data to display    Assessments & Plan (with Medical Decision Making)     I have reviewed the nursing notes.    I have reviewed the findings, diagnosis, plan and need for follow up with the patient.    Medical Decision Making  Ayala Wilson is a 65 year old female with significant pmhx of SNHL, GERD, pustular psoriasis, eczema who presents for evaluation of bee sting.  Differential diagnoses include local reaction, secondary skin infection.  Vital signs within normal  limits.  Patient is normotensive, afebrile, she is not tachycardic, and she is satting 95% on room air with a regular respiratory rate and effort.    On examination patient is alert, oriented, overall well-appearing.  She is breathing comfortably on room air and speaking in full sentences without difficulty.  She denies signs or symptoms of anaphylactic reaction such as other rash, nausea/vomiting, difficulty breathing, and she is not hypotensive or tachycardic on exam today.  There is periorbital swelling and erythema around the right eye with a small pinpoint area where the initial sting occurred by the right eyebrow.  No signs of a retained stinger.  Minimal tenderness to palpation.  No warmth or induration.  Extraocular movements are fully intact without pain, eyes PERRLA.  Visual acuity testing is normal with OD 20/40, OS 20/50, and both 20/30.    Low suspicion for secondary skin infection warranting antibiotic treatment as examination is consistent with ongoing localized reaction to the bee sting.  Recommended a 5-day burst of prednisone, and starting Zyrtec twice a day for the next 5 days to help reduce symptoms.  Recommended she continue cold compresses to reduce swelling.  She was given strict return precautions should she have no symptomatic improvement after 1 to 2 days of treatment, or if she develops rapidly spreading redness, severe pain, vision changes, difficulty breathing, or if other concerning symptoms develop.  Patient voiced understanding the plan and had no further questions.      Discharge Medication List as of 7/5/2025 10:29 AM        START taking these medications    Details   cetirizine (ZYRTEC) 10 MG tablet Take 1 tablet (10 mg) by mouth 2 times daily for 5 days., Disp-10 tablet, R-0, E-Prescribe      predniSONE (DELTASONE) 20 MG tablet Take two tablets (= 40mg) each day for 5 (five) days, Disp-10 tablet, R-0, E-Prescribe             Final diagnoses:   Bee sting reaction       Ayla  GIGI Swanson  July 5, 2025  [facility] EMERGENCY DEPT     Ayla Swanson PA-C  07/05/25 1031

## 2025-07-05 NOTE — DISCHARGE INSTRUCTIONS
Take prednisone once a day for 5 days to help decrease inflammation and your body's allergic reaction.    You should also take Zyrtec (an antihistamine allergy medication) twice a day for the next 5 days.  This will help decrease the allergic reaction and itching.    Continue cold compresses over the area multiple times a day to help reduce swelling.    Return to the ER if you develop worsening symptoms despite 1-2 days of treatment, rapidly spreading redness, severe pain, vision loss, fever, or if other concerning symptoms develop.

## 2025-07-05 NOTE — ED TRIAGE NOTES
Pt reports bee sting to the left eye on 7/3/25  Swelling and redness to the eye since   Visual acuity today with glasses:  Both 20/30  Left 20/50  Right 20/40

## 2025-07-10 ENCOUNTER — VIRTUAL VISIT (OUTPATIENT)
Dept: FAMILY MEDICINE | Facility: CLINIC | Age: 65
End: 2025-07-10
Payer: MEDICARE

## 2025-07-10 ENCOUNTER — TELEPHONE (OUTPATIENT)
Dept: FAMILY MEDICINE | Facility: CLINIC | Age: 65
End: 2025-07-10

## 2025-07-10 DIAGNOSIS — B00.1 RECURRENT COLD SORES: Primary | ICD-10-CM

## 2025-07-10 RX ORDER — VALACYCLOVIR HYDROCHLORIDE 1 G/1
2000 TABLET, FILM COATED ORAL 2 TIMES DAILY
Qty: 4 TABLET | Refills: 5 | Status: SHIPPED | OUTPATIENT
Start: 2025-07-10 | End: 2025-07-11

## 2025-07-10 NOTE — TELEPHONE ENCOUNTER
Emy states that she has taken her daughters Rx for cold sores and has worked really well but has never had her own Rx for this. She is getting another cold sore now and would like to get her own Rx today before she gets a full blown cold sore. Scheduled her for a virtual visit with Dr Almaguer today as she does not know how to do a evisit and wanted a telephone visit instead.     Desirae Ospina RN

## 2025-07-10 NOTE — TELEPHONE ENCOUNTER
New Medication Request    Contacts       Contact Date/Time Type Contact Phone/Fax    07/10/2025 09:09 AM CDT Phone (Incoming) Ayala Wilson (Self) [phone] (H)          What medication are you requesting?: Valacyclovir    Reason for medication request: Pt has a cold sore and would like Rx for Valacyclovir - Has taken this med before.  Declines appt.  Please call patient and advise.      Have you taken this medication before?: Yes:     Controlled Substance Agreement on file:   CSA -- Patient Level:    CSA: None found at the patient level.       Patient offered an appointment? Yes: Pt declined    Preferred Pharmacy:   [facility]   [address] [address] [address]  Phone: [phone] Fax: [phone]    Could we send this information to you [facility] or would you prefer to receive a phone call?:   Patient would prefer a phone call   Okay to leave a detailed message?: Yes at Cell number on file:    Telephone Information:   Mobile [phone]

## 2025-07-10 NOTE — PROGRESS NOTES
"Ayala is a 65 year old who is being evaluated via a billable video visit.    How would you like to obtain your AVS? MyChart  If the video visit is dropped, the invitation should be resent by: Text to cell phone: [phone]  Will anyone else be joining your video visit? No      Assessment & Plan     Recurrent cold sores  Discussed use of medication (has tried her daughters prescription in the past).   If having more than 5-6 outbreaks a year may benefit from doing suppressive dose of valtrex 500 mg daily  - valACYclovir (VALTREX) 1000 mg tablet; Take 2 tablets (2,000 mg) by mouth 2 times daily for 1 day.    BMI  Estimated body mass index is 28.54 kg/m  as calculated from the following:    Height as of 3/7/25: 1.702 m (5' 7\").    Weight as of 3/7/25: 82.6 kg (182 lb 3.2 oz).           Subjective   Ayala is a 65 year old, presenting for the following health issues:  Video Visit (Cold sores)        7/10/2025    11:18 AM   Additional Questions   Roomed by Nette BECERRA CMA   Accompanied by Self       Video Start Time: 11:38 AM    HPI      - Cold sores. She is currently using her daughters valacyclovir and would like to get a script for this      Had one three weeks ago and having another one now.        Review of Systems  Constitutional, HEENT, cardiovascular, pulmonary, gi and gu systems are negative, except as otherwise noted.      Objective           Vitals:  No vitals were obtained today due to virtual visit.    Physical Exam   GENERAL: alert and no distress  EYES: Eyes grossly normal to inspection.  No discharge or erythema, or obvious scleral/conjunctival abnormalities.  RESP: No audible wheeze, cough, or visible cyanosis.    SKIN: Visible skin clear. No significant rash, abnormal pigmentation or lesions.  NEURO: Cranial nerves grossly intact.  Mentation and speech appropriate for age.  PSYCH: Appropriate affect, tone, and pace of words          Video-Visit Details    Type of service:  Video Visit   Video End " Time:11:43 am  Originating Location (pt. Location): Home    Distant Location (provider location):  On-site  Platform used for Video Visit: Maggie  Signed Electronically by: Rosaura Almaguer MD

## (undated) RX ORDER — LIDOCAINE HYDROCHLORIDE 10 MG/ML
INJECTION, SOLUTION EPIDURAL; INFILTRATION; INTRACAUDAL; PERINEURAL
Status: DISPENSED
Start: 2021-08-18

## (undated) RX ORDER — PROPOFOL 10 MG/ML
INJECTION, EMULSION INTRAVENOUS
Status: DISPENSED
Start: 2021-08-18